# Patient Record
Sex: FEMALE | Race: WHITE | NOT HISPANIC OR LATINO | Employment: OTHER | URBAN - METROPOLITAN AREA
[De-identification: names, ages, dates, MRNs, and addresses within clinical notes are randomized per-mention and may not be internally consistent; named-entity substitution may affect disease eponyms.]

---

## 2019-07-30 ENCOUNTER — HOSPITAL ENCOUNTER (OUTPATIENT)
Dept: RADIOLOGY | Facility: HOSPITAL | Age: 69
Discharge: HOME | End: 2019-07-30
Attending: FAMILY MEDICINE
Payer: MEDICARE

## 2019-07-30 ENCOUNTER — OFFICE VISIT (OUTPATIENT)
Dept: FAMILY MEDICINE | Facility: CLINIC | Age: 69
End: 2019-07-30
Payer: MEDICARE

## 2019-07-30 VITALS — WEIGHT: 179 LBS | BODY MASS INDEX: 33.79 KG/M2 | HEIGHT: 61 IN

## 2019-07-30 VITALS
DIASTOLIC BLOOD PRESSURE: 74 MMHG | WEIGHT: 179.38 LBS | TEMPERATURE: 98 F | BODY MASS INDEX: 33.87 KG/M2 | HEIGHT: 61 IN | SYSTOLIC BLOOD PRESSURE: 118 MMHG | HEART RATE: 84 BPM

## 2019-07-30 DIAGNOSIS — Z12.31 ENCOUNTER FOR SCREENING MAMMOGRAM FOR BREAST CANCER: ICD-10-CM

## 2019-07-30 DIAGNOSIS — E03.9 HYPOTHYROIDISM, UNSPECIFIED TYPE: ICD-10-CM

## 2019-07-30 DIAGNOSIS — F17.210 CIGARETTE SMOKER: ICD-10-CM

## 2019-07-30 DIAGNOSIS — R93.7 ABNORMAL BONE DENSITY SCREENING: Primary | ICD-10-CM

## 2019-07-30 DIAGNOSIS — E11.65 UNCONTROLLED TYPE 2 DIABETES MELLITUS WITH HYPERGLYCEMIA: ICD-10-CM

## 2019-07-30 DIAGNOSIS — Z11.59 NEED FOR HEPATITIS C SCREENING TEST: ICD-10-CM

## 2019-07-30 DIAGNOSIS — E78.5 HYPERLIPIDEMIA, UNSPECIFIED HYPERLIPIDEMIA TYPE: ICD-10-CM

## 2019-07-30 DIAGNOSIS — Z78.0 MENOPAUSE: ICD-10-CM

## 2019-07-30 DIAGNOSIS — E55.9 VITAMIN D DEFICIENCY: ICD-10-CM

## 2019-07-30 DIAGNOSIS — Z12.11 COLON CANCER SCREENING: ICD-10-CM

## 2019-07-30 DIAGNOSIS — J44.9 CHRONIC OBSTRUCTIVE PULMONARY DISEASE, UNSPECIFIED COPD TYPE: ICD-10-CM

## 2019-07-30 DIAGNOSIS — I10 HYPERTENSION, ESSENTIAL: ICD-10-CM

## 2019-07-30 DIAGNOSIS — H66.006 RECURRENT ACUTE SUPPURATIVE OTITIS MEDIA WITHOUT SPONTANEOUS RUPTURE OF TYMPANIC MEMBRANE OF BOTH SIDES: ICD-10-CM

## 2019-07-30 DIAGNOSIS — Z79.899 ENCOUNTER FOR LONG-TERM (CURRENT) USE OF MEDICATIONS: Primary | ICD-10-CM

## 2019-07-30 DIAGNOSIS — N06.9 ISOLATED PROTEINURIA WITH MORPHOLOGIC LESION: ICD-10-CM

## 2019-07-30 PROBLEM — R06.02 SHORTNESS OF BREATH: Status: ACTIVE | Noted: 2017-07-18

## 2019-07-30 PROBLEM — I71.40 AAA (ABDOMINAL AORTIC ANEURYSM) WITHOUT RUPTURE: Status: ACTIVE | Noted: 2017-05-30

## 2019-07-30 PROBLEM — R09.02 HYPOXIA: Status: ACTIVE | Noted: 2017-06-19

## 2019-07-30 PROBLEM — J02.9 ACUTE PHARYNGITIS: Status: ACTIVE | Noted: 2018-02-02

## 2019-07-30 PROBLEM — R91.1 PULMONARY NODULE: Status: ACTIVE | Noted: 2019-05-07

## 2019-07-30 PROBLEM — H66.003 ACUTE SUPPURATIVE OTITIS MEDIA OF BOTH EARS WITHOUT SPONTANEOUS RUPTURE OF TYMPANIC MEMBRANES: Status: ACTIVE | Noted: 2019-07-30

## 2019-07-30 LAB
ALBUMIN/CREAT UR: 10.7 UG/MG (ref 0–30)
BILIRUB UR QL STRIP: NEGATIVE
CLARITY UR: CLEAR
COLOR UR: YELLOW
CREAT UR-MCNC: 149 MG/DL (ref 15–325)
GLUCOSE UR QL STRIP: NEGATIVE
HGB UR QL STRIP: ABNORMAL
KETONES UR QL STRIP: NEGATIVE
LEUKOCYTE ESTERASE UR QL STRIP: NEGATIVE
MICROALBUMIN UR DL<=1MG/L-MCNC: 16 UG/ML
NITRITE UR QL STRIP: NEGATIVE
PH UR STRIP: 6 [PH] (ref 5–8)
PROT UR QL STRIP: ABNORMAL
SP GR UR STRIP: 1.02 (ref 1–1.03)
URN SPEC COLLECT METH UR: ABNORMAL

## 2019-07-30 PROCEDURE — 77080 DEXA BONE DENSITY SPINE HIP: ICD-10-PCS | Mod: 26,,, | Performed by: RADIOLOGY

## 2019-07-30 PROCEDURE — 82043 UR ALBUMIN QUANTITATIVE: CPT

## 2019-07-30 PROCEDURE — 77067 SCR MAMMO BI INCL CAD: CPT | Mod: TC,PO

## 2019-07-30 PROCEDURE — 77063 BREAST TOMOSYNTHESIS BI: CPT | Mod: 26,,, | Performed by: RADIOLOGY

## 2019-07-30 PROCEDURE — 99999 PR PBB SHADOW E&M-NEW PATIENT-LVL V: ICD-10-PCS | Mod: PBBFAC,,, | Performed by: FAMILY MEDICINE

## 2019-07-30 PROCEDURE — 99205 PR OFFICE/OUTPT VISIT, NEW, LEVL V, 60-74 MIN: ICD-10-PCS | Mod: S$PBB,,, | Performed by: FAMILY MEDICINE

## 2019-07-30 PROCEDURE — 77080 DXA BONE DENSITY AXIAL: CPT | Mod: 26,,, | Performed by: RADIOLOGY

## 2019-07-30 PROCEDURE — 99999 PR PBB SHADOW E&M-NEW PATIENT-LVL V: CPT | Mod: PBBFAC,,, | Performed by: FAMILY MEDICINE

## 2019-07-30 PROCEDURE — 99205 OFFICE O/P NEW HI 60 MIN: CPT | Mod: PBBFAC,25,PO | Performed by: FAMILY MEDICINE

## 2019-07-30 PROCEDURE — 77067 SCR MAMMO BI INCL CAD: CPT | Mod: 26,,, | Performed by: RADIOLOGY

## 2019-07-30 PROCEDURE — 81002 URINALYSIS NONAUTO W/O SCOPE: CPT | Mod: PO

## 2019-07-30 PROCEDURE — 77063 MAMMO DIGITAL SCREENING BILAT WITH TOMOSYNTHESIS_CAD: ICD-10-PCS | Mod: 26,,, | Performed by: RADIOLOGY

## 2019-07-30 PROCEDURE — 99205 OFFICE O/P NEW HI 60 MIN: CPT | Mod: S$PBB,,, | Performed by: FAMILY MEDICINE

## 2019-07-30 PROCEDURE — 77080 DXA BONE DENSITY AXIAL: CPT | Mod: TC,PO

## 2019-07-30 PROCEDURE — 77067 MAMMO DIGITAL SCREENING BILAT WITH TOMOSYNTHESIS_CAD: ICD-10-PCS | Mod: 26,,, | Performed by: RADIOLOGY

## 2019-07-30 RX ORDER — ATORVASTATIN CALCIUM 40 MG/1
40 TABLET, FILM COATED ORAL DAILY
Qty: 90 TABLET | Refills: 3 | Status: SHIPPED | OUTPATIENT
Start: 2019-07-30 | End: 2020-05-30 | Stop reason: SDUPTHER

## 2019-07-30 RX ORDER — ALBUTEROL SULFATE 90 UG/1
1-2 AEROSOL, METERED RESPIRATORY (INHALATION)
Qty: 1 EACH | Refills: 11 | Status: SHIPPED | OUTPATIENT
Start: 2019-07-30 | End: 2021-05-29

## 2019-07-30 RX ORDER — UMECLIDINIUM BROMIDE AND VILANTEROL TRIFENATATE 62.5; 25 UG/1; UG/1
2 POWDER RESPIRATORY (INHALATION) 2 TIMES DAILY
COMMUNITY
Start: 2019-07-03 | End: 2019-07-30 | Stop reason: SDUPTHER

## 2019-07-30 RX ORDER — METFORMIN HYDROCHLORIDE 1000 MG/1
1000 TABLET ORAL 2 TIMES DAILY WITH MEALS
COMMUNITY
Start: 2019-05-06 | End: 2019-07-30 | Stop reason: SDUPTHER

## 2019-07-30 RX ORDER — SULFAMETHOXAZOLE AND TRIMETHOPRIM 800; 160 MG/1; MG/1
1 TABLET ORAL 2 TIMES DAILY
Qty: 14 TABLET | Refills: 0 | Status: SHIPPED | OUTPATIENT
Start: 2019-07-30 | End: 2019-08-06

## 2019-07-30 RX ORDER — ATORVASTATIN CALCIUM 40 MG/1
40 TABLET, FILM COATED ORAL DAILY
COMMUNITY
Start: 2019-07-05 | End: 2019-07-30 | Stop reason: SDUPTHER

## 2019-07-30 RX ORDER — METFORMIN HYDROCHLORIDE 1000 MG/1
1000 TABLET ORAL 2 TIMES DAILY WITH MEALS
Qty: 180 TABLET | Refills: 3 | Status: SHIPPED | OUTPATIENT
Start: 2019-07-30 | End: 2020-04-30

## 2019-07-30 RX ORDER — LEVOTHYROXINE SODIUM 50 UG/1
50 TABLET ORAL
COMMUNITY
Start: 2019-07-06 | End: 2019-07-30 | Stop reason: SDUPTHER

## 2019-07-30 RX ORDER — ALBUTEROL SULFATE 90 UG/1
1-2 AEROSOL, METERED RESPIRATORY (INHALATION)
COMMUNITY
Start: 2019-05-07 | End: 2019-07-30 | Stop reason: SDUPTHER

## 2019-07-30 RX ORDER — IPRATROPIUM BROMIDE AND ALBUTEROL SULFATE 2.5; .5 MG/3ML; MG/3ML
3 SOLUTION RESPIRATORY (INHALATION) EVERY 4 HOURS PRN
COMMUNITY
Start: 2017-05-16 | End: 2019-07-30 | Stop reason: SDUPTHER

## 2019-07-30 RX ORDER — UMECLIDINIUM BROMIDE AND VILANTEROL TRIFENATATE 62.5; 25 UG/1; UG/1
1 POWDER RESPIRATORY (INHALATION) DAILY
Qty: 1 EACH | Refills: 11 | Status: SHIPPED | OUTPATIENT
Start: 2019-07-30 | End: 2020-09-02

## 2019-07-30 RX ORDER — AMLODIPINE AND BENAZEPRIL HYDROCHLORIDE 10; 20 MG/1; MG/1
1 CAPSULE ORAL DAILY
Qty: 90 CAPSULE | Refills: 3 | Status: SHIPPED | OUTPATIENT
Start: 2019-07-30 | End: 2020-05-30 | Stop reason: SDUPTHER

## 2019-07-30 RX ORDER — CALCIUM CARBONATE 500(1250)
1 TABLET ORAL DAILY
Qty: 360 TABLET | Refills: 0 | Status: SHIPPED | OUTPATIENT
Start: 2019-07-30 | End: 2024-01-26

## 2019-07-30 RX ORDER — IPRATROPIUM BROMIDE AND ALBUTEROL SULFATE 2.5; .5 MG/3ML; MG/3ML
3 SOLUTION RESPIRATORY (INHALATION) EVERY 4 HOURS PRN
Qty: 1 BOX | Refills: 11 | Status: SHIPPED | OUTPATIENT
Start: 2019-07-30 | End: 2021-11-02 | Stop reason: SDUPTHER

## 2019-07-30 RX ORDER — LEVOTHYROXINE SODIUM 50 UG/1
50 TABLET ORAL
Qty: 90 TABLET | Refills: 3 | Status: SHIPPED | OUTPATIENT
Start: 2019-07-30 | End: 2020-05-30 | Stop reason: SDUPTHER

## 2019-07-30 RX ORDER — AMLODIPINE AND BENAZEPRIL HYDROCHLORIDE 10; 20 MG/1; MG/1
1 CAPSULE ORAL DAILY
COMMUNITY
Start: 2019-06-17 | End: 2019-07-30 | Stop reason: SDUPTHER

## 2019-07-30 RX ORDER — CALCIUM CARBONATE 500(1250)
1 TABLET ORAL DAILY
Refills: 0 | COMMUNITY
Start: 2019-07-30 | End: 2019-07-30

## 2019-07-30 NOTE — PROGRESS NOTES
Call patient with results.    You have abnormal DEXA scan which is showing osteopenia.  The patient needs to start using weight bearing exercises to help reduce the risk of a fracture.  Also, please start oscal 500+d taking 1 po tid.    Give #90 and rf x 11.    Ask the patient to recheck the DEXA scan in 2 years.

## 2019-07-30 NOTE — PATIENT INSTRUCTIONS
Chronic Lung Disease: If Oxygen Is Prescribed   Supplemental oxygen is prescribed if tests show that the level of oxygen in your blood is too low. If the level stays too low for too long, serious problems can develop in many parts of the body. Supplemental oxygen helps to relieve your symptoms and prevent future problems by getting more oxygen to the blood. Depending on your test results, you may need oxygen all the time. Or it may only be needed during certain activities, such as exercise or sleep. When oxygen is prescribed, youll be referred to a medical equipment company. They will set up the oxygen unit and teach you how to use it.  Nasal cannula     A nasal cannula should be placed in the nose with the prongs arching toward you.     Oxygen is most often inhaled through a nasal cannula (lightweight tube with two hollow prongs that fit just inside the nose).  Types of supplemental oxygen    Compressed oxygen   Oxygen concentrator   Liquid oxygen   Prescribed oxygen comes in several forms. You may use more than one type, depending on when you need oxygen:  · Compressed oxygen is oxygen gas stored in a tank. Because the oxygen is stored under pressure, these tanks must be handled carefully. Gauges on the tank can be used to adjust the oxygen flow rate. Your healthcare provider will determine what this should be. Small tanks can be carried. Larger tanks are on wheels and can be pulled around the house.  · An oxygen concentrator is a machine about the size of a large suitcase. It plugs into an electrical outlet. (A back-up oxygen supply is recommended in case of a power outage.) The machine takes oxygen from the air and concentrates it. Its then delivered to you through plastic tubing. The tubing is long enough so that you can move around the house. When youre using the concentrator, it must be kept somewhere that has a good supply of fresh air. (Dont keep it in a confined space, like a closet.) You may be set  up on a concentrator if you need oxygen all the time or while youre sleeping.  · Liquid oxygen results when oxygen gas is cooled to a very low temperature. Its kept in special containers that maintain this low temperature. When you use liquid oxygen, its warmed and becomes gas before reaching the cannula. Most tanks come with a portable unit that you can carry or pull on a cart. Some of these weigh only a few pounds. Liquid oxygen units are easy to carry around. If you need oxygen all the time or during activity, this kind of unit can help you stay active.  Oxygen is prescribed just for you  Your healthcare provider will prescribe oxygen based on your needs. Here are a few things you should know:  · A therapist from the medical equipment company will explain when to use oxygen and what type to use. Youll be taught how to use and maintain your oxygen equipment.  · You must use the exact rate of oxygen prescribed for each activity. Dont increase or decrease the amount without asking your healthcare provider first.  · Supplemental oxygen is a medicine. Its not addictive and causes no side effects when used as directed.   Date Last Reviewed: 5/1/2016  © 2530-7457 [employer]. [address] [place] [address], [place] [address]. All rights reserved. This information is not intended as a substitute for professional medical care. Always follow your healthcare professional's instructions.

## 2019-07-30 NOTE — ASSESSMENT & PLAN NOTE
Complete history and physical was completed today.  Complete and thorough medication reconciliation was performed.  Discussed risks and benefits of medications.  Advised patient on orders and health maintenance.  We discussed old records and old labs if available.  Will request any records not available through epic.  Continue current medications listed on your summary sheet.  07/30/2019

## 2019-07-30 NOTE — PROGRESS NOTES
Your urinalysis is abnormal.  Showing trace blood and trace protein.  Will need to repeat this test in about 2 weeks.  Or at your next visit.

## 2019-07-30 NOTE — TELEPHONE ENCOUNTER
----- Message from Jose Steinberg MD sent at 7/30/2019  1:05 PM CDT -----  Call patient with results.    You have abnormal DEXA scan which is showing osteopenia.  The patient needs to start using weight bearing exercises to help reduce the risk of a fracture.  Also, please start oscal 500+d taking 1 po tid.    Give #90 and rf x 11.    Ask the patient to recheck the DEXA scan in 2 years.

## 2019-07-30 NOTE — PROGRESS NOTES
Subjective:      Patient ID: Kami Higgins is a 68 y.o. female.    Chief Complaint: Establish Care      Problem List Items Addressed This Visit     COPD (chronic obstructive pulmonary disease)    Overview     Chronic.  Control uncertain.  Patient takes albuterol inhaler and also has a nebulizer at home.  Patient sees pulmonology.  Patient is supposed to be on oxygen.         Current Assessment & Plan     Follow-up pulmonology.  Refill inhalers.         Encounter for long-term (current) use of medications - Primary    Overview     07/30/2019   Patient is on CHRONIC long-term drug therapy for managed conditions. See medication list. Reports compliance.  No side effects reported.  Routine lab work is being monitored.  Patient does  need refills today.     Updating labs today.  Requesting records from [facility].    No results found for: WBC, HGB, HCT, MCV, PLT   CMP  No results found for: NA, K, CL, CO2, GLU, BUN, CREATININE, CALCIUM, PROT, ALBUMIN, BILITOT, ALKPHOS, AST, ALT, ANIONGAP, ESTGFRAFRICA, EGFRNONAA  No results found for: TSH            Current Assessment & Plan     Complete history and physical was completed today.  Complete and thorough medication reconciliation was performed.  Discussed risks and benefits of medications.  Advised patient on orders and health maintenance.  We discussed old records and old labs if available.  Will request any records not available through epic.  Continue current medications listed on your summary sheet.  07/30/2019           Hyperlipidemia    Overview     Chronic.  Control is uncertain.  Requesting records.  Checking fasting lipid panel.  Continue Lipitor.  No side effects reported.  Reports compliance.         Current Assessment & Plan     Continue Lipitor.  Checking fasting lipid panel         Hypertension, essential    Overview     Chronic.  Stable.  Well controlled today.  On amlodipine benazepril combination.  Reports compliance.  No side effects reported.  Denies any  chest pain shortness of breath blurred vision.         Current Assessment & Plan     Continue monitor blood pressure.  Checking labs.  Refill medications.         Uncontrolled type 2 diabetes mellitus with hyperglycemia    Overview     07/30/2019   Chronic. Stable.     No results found for: LABA1C, HGBA1C     Patient taking metformin    Med Compliance:  [facility]?  Dr. Royce Reilly in [place]  Ft exam?  Up-to-date  Pneumonia Vaccine?  Unsure         Current Assessment & Plan     Monitor hemoglobin A1c.  Discussed diabetic diet and exercise protocol.  Continue medications.  Monitor for side effects.  Discussed checking blood glucose.  Discussed symptoms to monitor for and to notify me immediately if persistent or worsening.  Follow up with Ophthalmology/Optometry and Podiatry.         Hypothyroidism    Overview     Patient on levothyroxine 50 mcg.  Chronic.  Stable.  Levels due for check.  No symptoms currently.  Reports compliance.  No side effects.         Current Assessment & Plan     Continue levothyroxine 50 mcg.  Check levels.         Need for hepatitis C screening test    Overview     Born in 1950.         Cigarette smoker    Overview     Chronic.  Patient says that she will quit smoking cold turkey.  Does not want any help otherwise.         Current Assessment & Plan     Advised of risk of smoking.  Order to quit smoking.         Encounter for screening mammogram for breast cancer    Overview     Due for mammogram screening.  No history of abnormals.  Past due         Current Assessment & Plan     Due for mammogram.         Colon cancer screening    Overview     Due for screening colonoscopy.  Sixteen years past due.         Current Assessment & Plan     Referral for colonoscopy.         Menopause    Overview     Bone density screening.  Patient is not on calcium or vitamin-D currently         Current Assessment & Plan     DXA Bone Density Spine And Hip  Narrative: EXAMINATION:  DEXA BONE DENSITY SPINE  HIP    CLINICAL HISTORY:  Asymptomatic menopausal state    TECHNIQUE:  DXA scanning was performed over the left hip and lumbar spine.  Review of the images confirms satisfactory positioning and technique.    COMPARISON:  None    FINDINGS:  The L1 to L4 vertebral bone mineral density is equal to 1.078 g/cm squared with a T score of -1.0.    The left femoral neck bone mineral density is equal to 0.793 g/cm squared with a T score of -1.8.    There is a 9.8% risk of a major osteoporotic fracture and a 2.1% risk of hip fracture in the next 10 years (FRAX).  Impression: Osteopenia    Consider FDA approved medical therapies in postmenopausal women and men aged 50 years and older, based on the following:    *A hip or vertebral (clinical or morphometric) fracture  *T score less than or equal to -2.5 at the femoral neck or spine after appropriate evaluation to exclude secondary causes.  *Low bone mass -- also known as osteopenia (T score between -1.0 and -2.5 at the femoral neck or spine) and a 10 year probability of hip fracture greater than or equal to 3% or a 10 year probability of major osteoporosis-related fracture greater than or equal to 20% based on the [place]-adapted WHO algorithm.  *Clinicians judgment and/or patient preference may indicate treatment for people with 10 year fracture probabilities is above or below these levels.    Electronically signed by: Deyvi Galdamez DO  Date:    07/30/2019  Time:    13:00      Started on Oscal +D         Vitamin D deficiency    Overview       07/30/2019  Vit d deficiency.  Needs to be on vitamin d supplement. No SE reported.  Has osteopenia.         Current Assessment & Plan     Checking vitamin-D level         Acute suppurative otitis media of both ears without spontaneous rupture of tympanic membranes    Overview     Acute.  Worsening.  Patient has bad allergies.  Has Flonase at home.  Has not taken anything for this.  Has not been evaluated.  Denies any fever.  Pain is  moderate.         Current Assessment & Plan     Treating with Bactrim.  Patient is allergic to penicillin.         Isolated proteinuria with morphologic lesion    Overview     Abnormal urinalysis.  Repeat scheduled in 2 weeks.  Patient denies any urologic complaints this time.         Current Assessment & Plan     Repeat urinalysis in 2 weeks.  If still having protein will refer to Nephrology.                Past Medical History:  Past Medical History:   Diagnosis Date    AAA (abdominal aortic aneurysm) without rupture     COPD (chronic obstructive pulmonary disease)     Hyperlipidemia     Hypertension     Pulmonary nodule     Thyroid disease      Past Surgical History:   Procedure Laterality Date    ABDOMINAL AORTIC ANEURYSM REPAIR      cardiac catheterization  2017    HYSTERECTOMY      THYROIDECTOMY      TUBAL LIGATION       Review of patient's allergies indicates:   Allergen Reactions    Aspirin, buffered     Codeine     Penicillins Hives     Current Outpatient Medications on File Prior to Visit   Medication Sig Dispense Refill    [DISCONTINUED] albuterol-ipratropium (DUO-NEB) 2.5 mg-0.5 mg/3 mL nebulizer solution Take 3 mLs by nebulization every 4 (four) hours as needed for Wheezing or Shortness of Breath.       [DISCONTINUED] amlodipine-benazepril 10-20mg (LOTREL) 10-20 mg per capsule Take 1 capsule by mouth once daily.       [DISCONTINUED] ANORO ELLIPTA 62.5-25 mcg/actuation DsDv Inhale 2 puffs into the lungs 2 (two) times daily.       [DISCONTINUED] atorvastatin (LIPITOR) 40 MG tablet Take 40 mg by mouth once daily.       [DISCONTINUED] levothyroxine (SYNTHROID) 50 MCG tablet Take 50 mcg by mouth before breakfast.       [DISCONTINUED] metFORMIN (GLUCOPHAGE) 1000 MG tablet Take 1,000 mg by mouth 2 (two) times daily with meals.       [DISCONTINUED] VENTOLIN HFA 90 mcg/actuation inhaler Inhale 1-2 puffs into the lungs as needed for Wheezing or Shortness of Breath.        No current  facility-administered medications on file prior to visit.      Social History     Socioeconomic History    Marital status:      Spouse name: Not on file    Number of children: Not on file    Years of education: Not on file    Highest education level: Not on file   Occupational History    Not on file   Social Needs    Financial resource strain: Somewhat hard    Food insecurity:     Worry: Sometimes true     Inability: Sometimes true    Transportation needs:     Medical: No     Non-medical: No   Tobacco Use    Smoking status: Current Some Day Smoker    Smokeless tobacco: Never Used   Substance and Sexual Activity    Alcohol use: Never     Frequency: Never    Drug use: Never    Sexual activity: Not Currently     Partners: Male   Lifestyle    Physical activity:     Days per week: 3 days     Minutes per session: 40 min    Stress: To some extent   Relationships    Social connections:     Talks on phone: More than three times a week     Gets together: Three times a week     Attends Faith service: Never     Active member of club or organization: No     Attends meetings of clubs or organizations: Never     Relationship status: Not on file   Other Topics Concern    Not on file   Social History Narrative    Not on file     Family History   Family history unknown: Yes       I have reviewed the complete PMH, social history, surgical history, allergies and medications.  As well as family history.    Review of Systems   Constitutional: Positive for fatigue. Negative for activity change and unexpected weight change.   HENT: Positive for ear pain. Negative for hearing loss, rhinorrhea and trouble swallowing.    Eyes: Negative for discharge and visual disturbance.   Respiratory: Negative for chest tightness and wheezing.    Cardiovascular: Negative for chest pain and palpitations.   Gastrointestinal: Negative for blood in stool, constipation, diarrhea and vomiting.   Endocrine: Negative for polydipsia and  "polyuria.   Genitourinary: Negative for difficulty urinating, dysuria, hematuria and menstrual problem.   Musculoskeletal: Negative for arthralgias, joint swelling and neck pain.   Neurological: Negative for weakness and headaches.   Psychiatric/Behavioral: Negative for confusion and dysphoric mood.       Objective:     /74   Pulse 84   Temp 97.9 °F (36.6 °C) (Oral)   Ht 5' 1" (1.549 m)   Wt 81.4 kg (179 lb 6.4 oz)   BMI 33.90 kg/m²     Physical Exam   Constitutional: She is oriented to person, place, and time. She appears well-developed and well-nourished. No distress.   HENT:   Head: Normocephalic and atraumatic.   Right Ear: There is tenderness. Tympanic membrane is injected, erythematous and bulging. A middle ear effusion is present.   Left Ear: There is tenderness. Tympanic membrane is erythematous and bulging. Tympanic membrane is not injected. A middle ear effusion is present.   Eyes: Pupils are equal, round, and reactive to light. EOM are normal.   Neck: Normal range of motion. Neck supple.   Cardiovascular: Normal rate, regular rhythm, normal heart sounds and intact distal pulses.   No murmur heard.  Pulmonary/Chest: Effort normal and breath sounds normal. No respiratory distress. She has no wheezes.   Musculoskeletal: Normal range of motion. She exhibits no edema.   Neurological: She is alert and oriented to person, place, and time. No cranial nerve deficit.   Skin: Skin is warm and dry. Capillary refill takes less than 2 seconds.   Psychiatric: She has a normal mood and affect. Her behavior is normal.   Nursing note and vitals reviewed.      Assessment:     1. Encounter for long-term (current) use of medications    2. Chronic obstructive pulmonary disease, unspecified COPD type    3. Hyperlipidemia, unspecified hyperlipidemia type    4. Hypertension, essential    5. Uncontrolled type 2 diabetes mellitus with hyperglycemia    6. Hypothyroidism, unspecified type    7. Need for hepatitis C " screening test    8. Cigarette smoker    9. Encounter for screening mammogram for breast cancer    10. Colon cancer screening    11. Menopause    12. Vitamin D deficiency    13. Recurrent acute suppurative otitis media without spontaneous rupture of tympanic membrane of both sides    14. Isolated proteinuria with morphologic lesion        Plan:     I have Reviewed and summarized old records.  I have performed thorough medication reconciliation today and discussed risk and benefits of each medication.  I have reviewed labs and discussed with patient.  All questions were answered.  I am requesting old records and will review them once they are available.    Problem List Items Addressed This Visit     COPD (chronic obstructive pulmonary disease)     Follow-up pulmonology.  Refill inhalers.         Relevant Medications    albuterol-ipratropium (DUO-NEB) 2.5 mg-0.5 mg/3 mL nebulizer solution    ANORO ELLIPTA 62.5-25 mcg/actuation DsDv    VENTOLIN HFA 90 mcg/actuation inhaler    Other Relevant Orders    CBC auto differential    Comprehensive metabolic panel    Ambulatory referral to Pulmonology    Encounter for long-term (current) use of medications - Primary     Complete history and physical was completed today.  Complete and thorough medication reconciliation was performed.  Discussed risks and benefits of medications.  Advised patient on orders and health maintenance.  We discussed old records and old labs if available.  Will request any records not available through epic.  Continue current medications listed on your summary sheet.  07/30/2019           Relevant Medications    albuterol-ipratropium (DUO-NEB) 2.5 mg-0.5 mg/3 mL nebulizer solution    amlodipine-benazepril 10-20mg (LOTREL) 10-20 mg per capsule    ANORO ELLIPTA 62.5-25 mcg/actuation DsDv    atorvastatin (LIPITOR) 40 MG tablet    levothyroxine (SYNTHROID) 50 MCG tablet    metFORMIN (GLUCOPHAGE) 1000 MG tablet    VENTOLIN HFA 90 mcg/actuation inhaler     Other Relevant Orders    CBC auto differential    Comprehensive metabolic panel    Hemoglobin A1c    Lipid panel    TSH    T3, free    T4    Vitamin D    URINALYSIS (Completed)    Hyperlipidemia     Continue Lipitor.  Checking fasting lipid panel         Relevant Medications    atorvastatin (LIPITOR) 40 MG tablet    Other Relevant Orders    Lipid panel    Vitamin D    Hypertension, essential     Continue monitor blood pressure.  Checking labs.  Refill medications.         Relevant Medications    amlodipine-benazepril 10-20mg (LOTREL) 10-20 mg per capsule    Other Relevant Orders    CBC auto differential    Comprehensive metabolic panel    Uncontrolled type 2 diabetes mellitus with hyperglycemia     Monitor hemoglobin A1c.  Discussed diabetic diet and exercise protocol.  Continue medications.  Monitor for side effects.  Discussed checking blood glucose.  Discussed symptoms to monitor for and to notify me immediately if persistent or worsening.  Follow up with Ophthalmology/Optometry and Podiatry.         Relevant Medications    metFORMIN (GLUCOPHAGE) 1000 MG tablet    Other Relevant Orders    Comprehensive metabolic panel    Hemoglobin A1c    Vitamin D    URINALYSIS (Completed)    MICROALBUMIN / CREATININE RATIO URINE    Hypothyroidism     Continue levothyroxine 50 mcg.  Check levels.         Relevant Medications    levothyroxine (SYNTHROID) 50 MCG tablet    Other Relevant Orders    Comprehensive metabolic panel    TSH    T3, free    T4    Vitamin D    Need for hepatitis C screening test    Relevant Orders    Hepatitis C antibody    Cigarette smoker     Advised of risk of smoking.  Order to quit smoking.         Relevant Orders    Ambulatory referral to Smoking Cessation Program    Encounter for screening mammogram for breast cancer     Due for mammogram.         Colon cancer screening     Referral for colonoscopy.         Relevant Orders    Case request GI: COLONOSCOPY (Completed)    Menopause     DXA Bone Density  Spine And Hip  Narrative: EXAMINATION:  DEXA BONE DENSITY SPINE HIP    CLINICAL HISTORY:  Asymptomatic menopausal state    TECHNIQUE:  DXA scanning was performed over the left hip and lumbar spine.  Review of the images confirms satisfactory positioning and technique.    COMPARISON:  None    FINDINGS:  The L1 to L4 vertebral bone mineral density is equal to 1.078 g/cm squared with a T score of -1.0.    The left femoral neck bone mineral density is equal to 0.793 g/cm squared with a T score of -1.8.    There is a 9.8% risk of a major osteoporotic fracture and a 2.1% risk of hip fracture in the next 10 years (FRAX).  Impression: Osteopenia    Consider FDA approved medical therapies in postmenopausal women and men aged 50 years and older, based on the following:    *A hip or vertebral (clinical or morphometric) fracture  *T score less than or equal to -2.5 at the femoral neck or spine after appropriate evaluation to exclude secondary causes.  *Low bone mass -- also known as osteopenia (T score between -1.0 and -2.5 at the femoral neck or spine) and a 10 year probability of hip fracture greater than or equal to 3% or a 10 year probability of major osteoporosis-related fracture greater than or equal to 20% based on the [place]-adapted WHO algorithm.  *Clinicians judgment and/or patient preference may indicate treatment for people with 10 year fracture probabilities is above or below these levels.    Electronically signed by: Deyvi Galdamez DO  Date:    07/30/2019  Time:    13:00      Started on Oscal +D         Relevant Orders    DXA Bone Density Spine And Hip (Completed)    Vitamin D deficiency     Checking vitamin-D level         Relevant Orders    Vitamin D    Acute suppurative otitis media of both ears without spontaneous rupture of tympanic membranes     Treating with Bactrim.  Patient is allergic to penicillin.         Relevant Medications    sulfamethoxazole-trimethoprim 800-160mg (BACTRIM DS) 800-160 mg Tab     Isolated proteinuria with morphologic lesion     Repeat urinalysis in 2 weeks.  If still having protein will refer to Nephrology.         Relevant Orders    URINALYSIS          Follow up in about 2 weeks (around 8/13/2019) for LAB RESULTS .    DISCLAIMER: This note was compiled by using a speech recognition dictation system and therefore please be aware that typographical / speech recognition errors can and do occur.  Please contact me if you see any errors specifically.    Jose Steinberg MD  We Offer Telehealth & Same Day Appointments!   Book your Telehealth appointment with me through my nurse or   Clinic appointments on [employer]!    Office: [phone]          Check out my Facebook Page and Follow Me at: CLICK HERE    Check out my website at [email or web address] by clicking on: CLICK HERE    To Schedule appointments online, go to [employer]: CLICK HERE     Location: [email or web address]    [address] [place] [address]    FAX: [phone]

## 2019-07-30 NOTE — ASSESSMENT & PLAN NOTE
DXA Bone Density Spine And Hip  Narrative: EXAMINATION:  DEXA BONE DENSITY SPINE HIP    CLINICAL HISTORY:  Asymptomatic menopausal state    TECHNIQUE:  DXA scanning was performed over the left hip and lumbar spine.  Review of the images confirms satisfactory positioning and technique.    COMPARISON:  None    FINDINGS:  The L1 to L4 vertebral bone mineral density is equal to 1.078 g/cm squared with a T score of -1.0.    The left femoral neck bone mineral density is equal to 0.793 g/cm squared with a T score of -1.8.    There is a 9.8% risk of a major osteoporotic fracture and a 2.1% risk of hip fracture in the next 10 years (FRAX).  Impression: Osteopenia    Consider FDA approved medical therapies in postmenopausal women and men aged 50 years and older, based on the following:    *A hip or vertebral (clinical or morphometric) fracture  *T score less than or equal to -2.5 at the femoral neck or spine after appropriate evaluation to exclude secondary causes.  *Low bone mass -- also known as osteopenia (T score between -1.0 and -2.5 at the femoral neck or spine) and a 10 year probability of hip fracture greater than or equal to 3% or a 10 year probability of major osteoporosis-related fracture greater than or equal to 20% based on the [place]-adapted WHO algorithm.  *Clinicians judgment and/or patient preference may indicate treatment for people with 10 year fracture probabilities is above or below these levels.    Electronically signed by: Deyvi Galdamez DO  Date:    07/30/2019  Time:    13:00      Started on [place] +D

## 2019-07-31 ENCOUNTER — TELEPHONE (OUTPATIENT)
Dept: FAMILY MEDICINE | Facility: CLINIC | Age: 69
End: 2019-07-31

## 2019-07-31 ENCOUNTER — TELEPHONE (OUTPATIENT)
Dept: ENDOSCOPY | Facility: HOSPITAL | Age: 69
End: 2019-07-31

## 2019-07-31 NOTE — PROGRESS NOTES
Patient's results were released through my chart and was notified.  Please follow up to make sure that they received the results.  Released results through my chart.

## 2019-07-31 NOTE — TELEPHONE ENCOUNTER
Endoscopy Scheduling Questionnaire:    1. Have you been admitted overnight to the hospital in the past 3 months? no  2. Do you get chest pain and SOB while walking up a flight of stairs? no  3. Have you had a cardiac stent placed in the past 12 months? no  4. Have you had a stroke or heart attack in the past 6 months? no  5. Have you had any chest pain in the past 3 months? no      If so, have you been evaluated by your PCP or Cardiologist? no  6. Do you take medication for weight loss? no  7. Have you been diagnosed with Diverticulitis within the past 3 months? no  8. Are you having any GI symptoms that you feel need to be evaluated prior to your procedure? no  9. Are you on dialysis? no  10. Are you diabetic? yes, pt will hold  11. Do you have any other health issues that you feel might limit your ability to safely have the procedure and/or sedation? yes     Patient stated she was told not to be sedated again due to lung/heart health concerns.    Patient will contact PCP regarding order for colonoscopy and call back.

## 2019-07-31 NOTE — TELEPHONE ENCOUNTER
----- Message from Liliane Goldsmith sent at 7/31/2019 11:43 AM CDT -----  Contact: pt   States she's calling rg speaking with nurse rg pt not being able to be put under or she may not come out of it per pt's pulmonologist and was told to contact her pcp rg the test that is needing to be scheduled and can be reached at [phone]//thanks/dbw

## 2019-07-31 NOTE — TELEPHONE ENCOUNTER
----- Message from Liliane Goldsmith sent at 7/31/2019 11:43 AM CDT -----  Contact: pt   States she's calling rg speaking with nurse rg pt not being able to be put under or she may not come out of it per pt's pulmonologist and was told to contact her pcp rg the test that is needing to be scheduled and can be reached at [phone]//thanks/dbw

## 2019-07-31 NOTE — TELEPHONE ENCOUNTER
Spoke with patient regarding this and Dr. Steinberg says to switch patient to Cologuard instead of colonoscopy

## 2019-07-31 NOTE — TELEPHONE ENCOUNTER
See will procedure she is referring to.  If it is the colonoscopy that we can try Cologard 1st.  She may need to be evaluated further for this.

## 2019-08-02 ENCOUNTER — TELEPHONE (OUTPATIENT)
Dept: RADIOLOGY | Facility: HOSPITAL | Age: 69
End: 2019-08-02

## 2019-08-05 ENCOUNTER — HOSPITAL ENCOUNTER (OUTPATIENT)
Dept: RADIOLOGY | Facility: HOSPITAL | Age: 69
Discharge: HOME | End: 2019-08-05
Attending: FAMILY MEDICINE
Payer: MEDICARE

## 2019-08-05 DIAGNOSIS — R92.8 ABNORMAL MAMMOGRAM: ICD-10-CM

## 2019-08-05 DIAGNOSIS — N63.20 LEFT BREAST MASS: Primary | ICD-10-CM

## 2019-08-05 PROCEDURE — 76642 ULTRASOUND BREAST LIMITED: CPT | Mod: 26,LT,, | Performed by: RADIOLOGY

## 2019-08-05 PROCEDURE — 76642 US BREAST LEFT LIMITED: ICD-10-PCS | Mod: 26,LT,, | Performed by: RADIOLOGY

## 2019-08-05 PROCEDURE — 76642 ULTRASOUND BREAST LIMITED: CPT | Mod: TC,PO,LT

## 2019-08-05 NOTE — PROGRESS NOTES
Abnormal breast ultrasound.  Repeat in 6 months.  Order sign.  Make sure patient has follow-up appointment for 6 months.  She will also come in in a couple of weeks to discuss labs and other issues.

## 2019-08-13 ENCOUNTER — PATIENT OUTREACH (OUTPATIENT)
Dept: ADMINISTRATIVE | Facility: HOSPITAL | Age: 69
End: 2019-08-13

## 2019-08-20 ENCOUNTER — LAB VISIT (OUTPATIENT)
Dept: LAB | Facility: HOSPITAL | Age: 69
End: 2019-08-20
Attending: FAMILY MEDICINE
Payer: MEDICARE

## 2019-08-20 ENCOUNTER — OFFICE VISIT (OUTPATIENT)
Dept: FAMILY MEDICINE | Facility: CLINIC | Age: 69
End: 2019-08-20
Payer: MEDICARE

## 2019-08-20 VITALS
WEIGHT: 179.38 LBS | SYSTOLIC BLOOD PRESSURE: 124 MMHG | HEART RATE: 88 BPM | BODY MASS INDEX: 33.9 KG/M2 | TEMPERATURE: 98 F | DIASTOLIC BLOOD PRESSURE: 80 MMHG

## 2019-08-20 DIAGNOSIS — D75.89 MACROCYTOSIS WITHOUT ANEMIA: ICD-10-CM

## 2019-08-20 DIAGNOSIS — N63.20 LEFT BREAST MASS: ICD-10-CM

## 2019-08-20 DIAGNOSIS — E46 PROTEIN-CALORIE MALNUTRITION, UNSPECIFIED SEVERITY: ICD-10-CM

## 2019-08-20 DIAGNOSIS — R31.9 HEMATURIA, UNSPECIFIED TYPE: Primary | ICD-10-CM

## 2019-08-20 DIAGNOSIS — H66.93 CHRONIC OTITIS MEDIA OF BOTH EARS: ICD-10-CM

## 2019-08-20 DIAGNOSIS — H92.02 LEFT EAR PAIN: ICD-10-CM

## 2019-08-20 LAB
BILIRUB UR QL STRIP: NEGATIVE
CLARITY UR: CLEAR
COLOR UR: YELLOW
FOLATE SERPL-MCNC: 11.5 NG/ML (ref 4–24)
GLUCOSE UR QL STRIP: NEGATIVE
HGB UR QL STRIP: ABNORMAL
KETONES UR QL STRIP: NEGATIVE
LEUKOCYTE ESTERASE UR QL STRIP: NEGATIVE
NITRITE UR QL STRIP: NEGATIVE
PH UR STRIP: 6 [PH] (ref 5–8)
PROT UR QL STRIP: NEGATIVE
SP GR UR STRIP: 1.02 (ref 1–1.03)
URN SPEC COLLECT METH UR: ABNORMAL
VIT B12 SERPL-MCNC: 267 PG/ML (ref 210–950)

## 2019-08-20 PROCEDURE — 82607 VITAMIN B-12: CPT

## 2019-08-20 PROCEDURE — 99214 OFFICE O/P EST MOD 30 MIN: CPT | Mod: S$PBB,,, | Performed by: FAMILY MEDICINE

## 2019-08-20 PROCEDURE — 99214 PR OFFICE/OUTPT VISIT, EST, LEVL IV, 30-39 MIN: ICD-10-PCS | Mod: S$PBB,,, | Performed by: FAMILY MEDICINE

## 2019-08-20 PROCEDURE — 81002 URINALYSIS NONAUTO W/O SCOPE: CPT | Mod: PO

## 2019-08-20 PROCEDURE — 99214 OFFICE O/P EST MOD 30 MIN: CPT | Mod: PBBFAC,PO | Performed by: FAMILY MEDICINE

## 2019-08-20 PROCEDURE — 99999 PR PBB SHADOW E&M-EST. PATIENT-LVL IV: ICD-10-PCS | Mod: PBBFAC,,, | Performed by: FAMILY MEDICINE

## 2019-08-20 PROCEDURE — 99999 PR PBB SHADOW E&M-EST. PATIENT-LVL IV: CPT | Mod: PBBFAC,,, | Performed by: FAMILY MEDICINE

## 2019-08-20 PROCEDURE — 36415 COLL VENOUS BLD VENIPUNCTURE: CPT | Mod: PO

## 2019-08-20 PROCEDURE — 82746 ASSAY OF FOLIC ACID SERUM: CPT

## 2019-08-20 RX ORDER — CEFDINIR 300 MG/1
300 CAPSULE ORAL 2 TIMES DAILY
Qty: 20 CAPSULE | Refills: 0 | Status: SHIPPED | OUTPATIENT
Start: 2019-08-20 | End: 2019-08-30

## 2019-08-20 NOTE — PATIENT INSTRUCTIONS
What is Hematuria?  Blood in your urine is a condition known as hematuria. Most of the time, the cause of hematuria is not serious. But, never ignore blood in the urine. Your doctor can evaluate you to find the cause of the bleeding and treat it, if needed.  Types of hematuria  · Gross hematuria means that the blood can be seen by the naked eye. The urine may look pinkish, brownish, or bright red.  · Microscopic hematuria means that the urine is clear, but blood cells can be seen when urine is looked at under a microscope or tested in a lab.  Both types of hematuria can have the same causes. Neither one is necessarily more serious than the other. With either type, you may have other symptoms, such as pain, pressure, or burning when you urinate, abdominal pain, or back pain. Or, you may not have any other symptoms. No matter how much blood is found, the cause of the bleeding needs to be identified.  Finding the cause of hematuria  To evaluate your condition, your doctor will first confirm that blood is indeed present. Then other tests are done to pinpoint where the blood is coming from and why. Your doctor will decide which tests will best determine the cause of your hematuria. Some common tests are listed below.  · History and physical exam  · Lab tests may include urinalysis, a urine culture, a urine cytology, and various blood tests  · Cystoscopy  · Computed tomography (CT) or CT urography  · Magnetic resonance imaging (MRI) or MR urography  · Ultrasound of the kidney  · Kidney biopsy  Causes of hematuria include the very benign (exercised induced hematuria) to the very severe (cancer of the urinary system). A variety of treatments are available depending on the cause.  Date Last Reviewed: 12/2/2016 © 2000-2017 [employer]. [address] [place] [id number]. All rights reserved. This information is not intended as a substitute for professional medical care. Always follow your healthcare  professional's instructions.

## 2019-08-20 NOTE — PROGRESS NOTES
Subjective:      Patient ID: Kami Higgins is a 68 y.o. female.    Chief Complaint: Follow-up (review lab results)      Problem List Items Addressed This Visit     Hematuria - Primary    Overview     Chronic.  Persistent hematuria.  Patient is a heavy smoker.  Patient needs follow-up with Urology.         Current Assessment & Plan     Referral to Urology.  No gross blood but persistent hematuria on urinalysis.  Patient is heavy smoker.         Macrocytosis without anemia    Overview     CBC stable but has elevated MCV.  Patient denies history of B12 or folate deficiency.  Checking folate B12.    Lab Results   Component Value Date    [id number] 267 08/20/2019     Lab Results   Component Value Date    FOLATE 11.5 08/20/2019              Current Assessment & Plan     Recommend vitamin B12 supplementation.  Vitamin B12 is at lower limits of normal.  Folate is normal.  Recheck CBC and B12 levels in 3 months.  ER precautions were given if have any signs or symptoms of anemia.  All questions were answered.  Patient voiced understanding.    Avoid excessive amounts of alcohol.  Avoid anti-inflammatories.         Left ear pain    Overview     Chronic.  Worsening left ear pain.  Associated with otitis media.  Patient has not seen ENT.  Patient is still smoking.         Current Assessment & Plan     Recommend ENT follow-up.  Will treat with antibiotics.  Flonase.  Stop smoking.  ER precautions were given.         Protein-calorie malnutrition    Overview     Patient reports decreased muscle mass.  Patient has not been eating much due to decreased appetite.  Patient is concerned about B12 folate deficiency.  Patient has microcytosis on laboratory analysis         Current Assessment & Plan     Vitamin B12 is deficient.  Folate is within normal limits.  Checking levels.  Discussed diet exercise.         Left breast mass    Overview     Narrative & Impression     Result:   US Breast Left Limited     History:  Patient is 68 y.o. and  is seen for a diagnostic mammogram.     Films Compared:  Compared to: 07/30/2019 Mammo Digital Screening Bilat w/ Corey     Findings:     There is a 5 mm x 5 mm x 4 mm oval, hypoechoic mass seen in the left breast, 3 cm from the nipple.      Impression:  Left  Mass: Left breast 5 mm x 5 mm x 4 mm mass. Assessment: 3 - Probably benign. Short Interval Follow-Up in 6 Months is recommended.      BI-RADS Category:   Left: 3 - Probably Benign  Overall: 3 - Probably Benign     Recommendation:  Short interval follow-up is recommended in 6 Months.     The patient's estimated lifetime risk of breast cancer (to age 85) based on Tyrer-Cuzick - 7 risk assessment model is: Tyrer-Cuzick: 4.91 %. According to the American Cancer Society,  patients with a lifetime breast cancer risk of 20% or higher might benefit from supplemental screening tests.              Current Assessment & Plan     Reviewed mammogram and ultrasound results.  Recommend 6 months follow-up.  Patient should be scheduled.         Chronic otitis media of both ears    Overview     Chronic.  Not improving.  Patient has been on antibiotics with no success.  She is allergic to penicillins.  Patient has not seen ENT.  Patient is using over-the-counter medications without relief.         Current Assessment & Plan     Acute on chronic infection.  Treating with cefdinir.  Patient is penicillin allergic.  Recommended that patient use Flonase over-the-counter.                Past Medical History:  Past Medical History:   Diagnosis Date    AAA (abdominal aortic aneurysm) without rupture     COPD (chronic obstructive pulmonary disease)     Hyperlipidemia     Hypertension     Pulmonary nodule     Thyroid disease      Past Surgical History:   Procedure Laterality Date    ABDOMINAL AORTIC ANEURYSM REPAIR      cardiac catheterization  2017    HYSTERECTOMY      THYROIDECTOMY      TUBAL LIGATION       Review of patient's allergies indicates:   Allergen Reactions     Aspirin, buffered     Codeine     Penicillins Hives     Medication List with Changes/Refills   New Medications    CEFDINIR (OMNICEF) 300 MG CAPSULE    Take 1 capsule (300 mg total) by mouth 2 (two) times daily. for 10 days   Current Medications    ALBUTEROL-IPRATROPIUM (DUO-NEB) 2.5 MG-0.5 MG/3 ML NEBULIZER SOLUTION    Take 3 mLs by nebulization every 4 (four) hours as needed for Wheezing or Shortness of Breath.    AMLODIPINE-BENAZEPRIL 10-20MG (LOTREL) 10-20 MG PER CAPSULE    Take 1 capsule by mouth once daily.    ANORO ELLIPTA 62.5-25 MCG/ACTUATION DSDV    Inhale 1 puff into the lungs once daily.    ATORVASTATIN (LIPITOR) 40 MG TABLET    Take 1 tablet (40 mg total) by mouth once daily.    CALCIUM CARBONATE (OS-FREDY) 500 MG CALCIUM (1,250 MG) TABLET    Take 1 tablet (500 mg total) by mouth once daily.    LEVOTHYROXINE (SYNTHROID) 50 MCG TABLET    Take 1 tablet (50 mcg total) by mouth before breakfast.    METFORMIN (GLUCOPHAGE) 1000 MG TABLET    Take 1 tablet (1,000 mg total) by mouth 2 (two) times daily with meals.    VENTOLIN HFA 90 MCG/ACTUATION INHALER    Inhale 1-2 puffs into the lungs as needed for Wheezing or Shortness of Breath.      Social History     Tobacco Use    Smoking status: Current Some Day Smoker    Smokeless tobacco: Never Used   Substance Use Topics    Alcohol use: Never     Frequency: Never      Family History   Family history unknown: Yes       I have reviewed the complete PMH, social history, surgical history, allergies and medications.  As well as family history.    Review of Systems   Constitutional: Negative for activity change and unexpected weight change.   HENT: Positive for ear pain. Negative for hearing loss, rhinorrhea and trouble swallowing.    Eyes: Negative for discharge and visual disturbance.   Respiratory: Positive for wheezing. Negative for chest tightness.    Cardiovascular: Negative for chest pain and palpitations.   Gastrointestinal: Negative for blood in stool,  constipation, diarrhea and vomiting.   Endocrine: Negative for polydipsia and polyuria.   Genitourinary: Negative for decreased urine volume, difficulty urinating, dysuria, flank pain, frequency, hematuria, menstrual problem, urgency, vaginal bleeding, vaginal discharge and vaginal pain.   Musculoskeletal: Positive for arthralgias and joint swelling. Negative for neck pain.   Skin: Negative for rash.   Allergic/Immunologic: Negative for environmental allergies.   Neurological: Negative for weakness and headaches.   Psychiatric/Behavioral: Negative for confusion and dysphoric mood.       Objective:     /80   Pulse 88   Temp 98.2 °F (36.8 °C) (Oral)   Wt 81.4 kg (179 lb 6.4 oz)   BMI 33.90 kg/m²     Physical Exam   Constitutional: She is oriented to person, place, and time. She appears well-developed and well-nourished. No distress.   HENT:   Head: Normocephalic and atraumatic.   Right Ear: No tenderness. Tympanic membrane is bulging. Tympanic membrane is not injected and not erythematous. A middle ear effusion is present.   Left Ear: There is tenderness. Tympanic membrane is injected, scarred, erythematous and bulging. A middle ear effusion is present.   Eyes: Pupils are equal, round, and reactive to light. EOM are normal.   Neck: Normal range of motion. Neck supple.   Cardiovascular: Normal rate, regular rhythm, normal heart sounds and intact distal pulses.   No murmur heard.  Pulmonary/Chest: Effort normal and breath sounds normal. No respiratory distress. She has no wheezes.   Musculoskeletal: Normal range of motion. She exhibits no edema.   Neurological: She is alert and oriented to person, place, and time. No cranial nerve deficit.   Skin: Skin is warm and dry. Capillary refill takes less than 2 seconds.   Psychiatric: She has a normal mood and affect. Her behavior is normal.   Nursing note and vitals reviewed.      Assessment:     1. Hematuria, unspecified type    2. Macrocytosis without anemia    3.  Left ear pain    4. Protein-calorie malnutrition, unspecified severity     5. Left breast mass    6. Chronic otitis media of both ears        Plan:     I have Reviewed and summarized old records.  I have performed thorough medication reconciliation today and discussed risk and benefits of each medication.  I have reviewed labs and discussed with patient.  All questions were answered.  I am requesting old records and will review them once they are available.    Problem List Items Addressed This Visit     Hematuria - Primary     Referral to Urology.  No gross blood but persistent hematuria on urinalysis.  Patient is heavy smoker.         Relevant Orders    URINALYSIS (Completed)    Ambulatory referral to Urology    Macrocytosis without anemia     Recommend vitamin B12 supplementation.  Vitamin B12 is at lower limits of normal.  Folate is normal.  Recheck CBC and B12 levels in 3 months.  ER precautions were given if have any signs or symptoms of anemia.  All questions were answered.  Patient voiced understanding.    Avoid excessive amounts of alcohol.  Avoid anti-inflammatories.         Relevant Orders    Vitamin B12 (Completed)    Folate (Completed)    Left ear pain     Recommend ENT follow-up.  Will treat with antibiotics.  Flonase.  Stop smoking.  ER precautions were given.         Protein-calorie malnutrition     Vitamin B12 is deficient.  Folate is within normal limits.  Checking levels.  Discussed diet exercise.         Relevant Orders    Vitamin B12 (Completed)    Folate (Completed)    Left breast mass     Reviewed mammogram and ultrasound results.  Recommend 6 months follow-up.  Patient should be scheduled.         Chronic otitis media of both ears     Acute on chronic infection.  Treating with cefdinir.  Patient is penicillin allergic.  Recommended that patient use Flonase over-the-counter.         Relevant Medications    cefdinir (OMNICEF) 300 MG capsule    Other Relevant Orders    Ambulatory referral to ENT           Follow up in about 4 weeks (around 9/17/2019), or if symptoms worsen or fail to improve, for labs and test results.    If no improvement in symptoms or symptoms worsen, advised to call/follow-up at clinic or go to ER. Patient voiced understanding and all questions/concerns were addressed.     DISCLAIMER: This note was compiled by using a speech recognition dictation system and therefore please be aware that typographical / speech recognition errors can and do occur.  Please contact me if you see any errors specifically.    Jose Steinberg MD  We Offer Telehealth & Same Day Appointments!   Book your Telehealth appointment with me through my nurse or   Clinic appointments on [facility]!    Office: [phone]          Check out my Facebook Page and Follow Me at: CLICK HERE    Check out my website at [facility] by clicking on: CLICK HERE    To Schedule appointments online, go to [facility]: CLICK HERE     Location: [email or web address]    [address] [place] [address]    FAX: [phone]

## 2019-08-20 NOTE — PROGRESS NOTES
Please call to make sure patient get the results.    Your urinalysis is abnormal.  Showing persistent microscopic hematuria.  Referral to Urology since smoking history.

## 2019-08-21 ENCOUNTER — TELEPHONE (OUTPATIENT)
Dept: FAMILY MEDICINE | Facility: CLINIC | Age: 69
End: 2019-08-21

## 2019-08-21 DIAGNOSIS — E53.8 VITAMIN B12 DEFICIENCY: ICD-10-CM

## 2019-08-21 DIAGNOSIS — R31.9 HEMATURIA, UNSPECIFIED TYPE: Primary | ICD-10-CM

## 2019-08-21 DIAGNOSIS — E55.9 VITAMIN D DEFICIENCY: Primary | ICD-10-CM

## 2019-08-21 NOTE — ASSESSMENT & PLAN NOTE
Recommend vitamin B12 supplementation.  Vitamin B12 is at lower limits of normal.  Folate is normal.  Recheck CBC and B12 levels in 3 months.  ER precautions were given if have any signs or symptoms of anemia.  All questions were answered.  Patient voiced understanding.    Avoid excessive amounts of alcohol.  Avoid anti-inflammatories.

## 2019-08-21 NOTE — ASSESSMENT & PLAN NOTE
Acute on chronic infection.  Treating with cefdinir.  Patient is penicillin allergic.  Recommended that patient use Flonase over-the-counter.

## 2019-08-21 NOTE — PROGRESS NOTES
Your vitamin B12 level is at the lower limits of normal  I would recommend supplementation with vitamin B12 1000 mcg daily.  Repeat level in 3 months.  Folate level is within normal limits.  '    Please call the patient to make sure that they received the results through [facility].  I have sent a message to them with the interpretation.  See if they have any questions and make a follow-up appointment if not already schedule and if needed.

## 2019-08-21 NOTE — ASSESSMENT & PLAN NOTE
Reviewed mammogram and ultrasound results.  Recommend 6 months follow-up.  Patient should be scheduled.

## 2019-08-21 NOTE — PROGRESS NOTES
"Referring Provider:    Jose Steinberg Md  [address] [facility] [place] [address]  Subjective:   Patient: Kami Higgins [id number], :1950   Visit date:2019 9:11 AM    Chief Complaint:  Other (pt has ear infection in both ears per dr Steinberg)    HPI:  Kami is a 68 y.o. female who I was asked to see in consultation for evaluation of the following issue(s):    Patient first presented to clinic on 19 for an evaluation of recurrent AOM. She saw her PCP for this on 19 and was started on Cefdinir. C/o bilateral otalgia on and off x past 3 mo. Hx multiple PET's as a child. Known hx of bilat tympanic membrane perforations. Increased otorrhea bilaterally over the past week, clear/yellow. No recent audiogram. She does c/o HL bilaterally,"louder noises bother/hurt my ears". No other cold ssx. No relieving factors.       Review of Systems:  Negative unless checked off.  Gen:  []fever   []fatigue  HENT:  []nosebleeds  []dental problem   Eyes:  []photophobia  []visual disturbance  Resp:  []chest tightness []wheezing  Card:  []chest pain  []leg swelling  GI:  []abdominal pain []blood in stool  :  []dysuria  []hematuria  Musc:  []joint swelling  []gait problem  Skin:  []color change  []pallor  Neuro:  []seizures  []numbness  Hem:  []bruise/bleed easily  Psych:  []hallucinations  []behavioral problems  Allergy/Imm: is allergic to aspirin, buffered; codeine; and penicillins.    Her meds, allergies, medical, surgical, social & family histories were reviewed & updated:  -     She has a current medication list which includes the following prescription(s): albuterol-ipratropium, amlodipine-benazepril 10-20mg, anoro ellipta, atorvastatin, calcium carbonate, cefdinir, levothyroxine, metformin, ventolin hfa, and ofloxacin.  -     She  has a past medical history of AAA (abdominal aortic aneurysm) without rupture, COPD (chronic obstructive pulmonary disease), Hyperlipidemia, Hypertension, Pulmonary nodule, and " Thyroid disease.   -     She does not have any pertinent problems on file.   -     She  has a past surgical history that includes Thyroidectomy; Tubal ligation; Hysterectomy; Abdominal aortic aneurysm repair; and cardiac catheterization (2017).  -     She  reports that she has been smoking.  She has never used smokeless tobacco. She reports that she does not drink alcohol or use drugs.  -     Her Family history is unknown by patient.  -     She is allergic to aspirin, buffered; codeine; and penicillins.    Objective:     Physical Exam:  Vitals:  Temp 98.2 °F (36.8 °C) (Tympanic)   Ht 5' (1.524 m)   Wt 81.8 kg (180 lb 6.4 oz)   BMI 35.23 kg/m²   General appearance:  Well developed, well nourished    Eyes:  Extraocular motions intact, PERRL    Communication:  no hoarseness, no dysphonia    Ears:  See exam below  Nose:  No masses/lesions of external nose, nasal mucosa, septum, and turbinates were within normal limits.  Mouth:  No mass/lesion of lips, teeth, gums, hard/soft palate, tongue, tonsils, or oropharynx.    Cardiovascular:  No pedal edema; Radial Pulses +2     Neck & Lymphatics:  No cervical lymphadenopathy, no neck mass/crepitus/ asymmetry, trachea is midline, no thyroid enlargement/tenderness/mass.    Psych: Oriented x3,  Alert with normal mood and affect.     Respiration/Chest:  Symmetric expansion during respiration, normal respiratory effort.    Skin:  Warm and intact. No ulcerations of face, scalp, neck.    Assessment & Plan:   Kami was seen today for other.    Diagnoses and all orders for this visit:    Perforation of both tympanic membranes    Impacted cerumen of left ear    Other infective acute otitis externa of both ears    Other orders  -     ofloxacin (OCUFLOX) 0.3 % ophthalmic solution; Apply 5 drops into both ears twice daily for 7 days.    OTITIS EXTERNA-  Kami has evidence of bilateral otitis externa.  This was visualized after removal of cerumen.  We discussed the need for dry ear  precautions.  For maintenance therapy, I recommend a mixture of distilled vinegar and alcohol.  For acute infection, antibiotic therapy is needed.  For Kami Higgins I recommend:    L Cerumen removed w/o difficulty  Ofloxacin bilaterally x 1 week  Recheck in 1 week. Once infection clears, plan for audiogram.     We discussed her medical conditions, treatments and plan.  Kami should return to clinic if any issues arise (symptoms worsen or persist), otherwise we will see her back in the clinic In 1 week.     Over 25 minutes were spent in face to face contact with the patient with greater than 50% spent discussing their diagnosis and coordination of care.     Thank you for allowing me to participate in the care of Kami.      Lilo Hill PA-C  [facility]   [facility]  [address] [place] [address]  P: [phone]  F: [phone]    Patient: Kami Higgins [id number], :1950  Procedure date:2019  Patient's medications, allergies, past medical, surgical, social and family histories were reviewed and updated as appropriate.  Chief Complaint:  Other (pt has ear infection in both ears per dr Steinberg)    HPI:  Kami is a 68 y.o. female with the history of present illness as discussed in the clinic note from today.  During this unrelated patient encounter I observed impacted cerumen.  The otoscopic examination of the tympanic membrane was not possible due to copious cerumen impaction.      Procedure: The patient was in agreement with the examination and debridement of the ears. Removal of the cerumen required a high level of expertise and use of multiple micro-instruments including a size 7 suction and small forceps.     With the patient in the supine position, we used the operating microscope to examine both ears with the appropriate sized ear speculum.  A variety of sterile, micro-instruments were utilized to remove the cerumen atraumatically.  I performed the  procedure which required a significant amount of time and effort. The tympanic membrane was then well visualized.  The patient tolerated the procedure well and there were no complications.    Findings:   Right ear had no wax, the EAC was erythematous, swollen and inflamed, and the tympanic membrane small central perforation ~10%.    Left ear had significant wax, the EAC was erythematous, swollen, discharge present and inflamed, and the tympanic membrane small central perforation present ~10%.

## 2019-08-21 NOTE — ASSESSMENT & PLAN NOTE
Referral to Urology.  No gross blood but persistent hematuria on urinalysis.  Patient is heavy smoker.

## 2019-08-21 NOTE — TELEPHONE ENCOUNTER
----- Message from Jose Steinberg MD sent at 8/20/2019 11:25 AM CDT -----  Please call to make sure patient get the results.    Your urinalysis is abnormal.  Showing persistent microscopic hematuria.  Referral to Urology since smoking history.

## 2019-08-21 NOTE — TELEPHONE ENCOUNTER
----- Message from Jose Steinberg MD sent at 8/20/2019  9:54 PM CDT -----  Your vitamin B12 level is at the lower limits of normal  I would recommend supplementation with vitamin B12 1000 mcg daily.  Repeat level in 3 months.  Folate level is within normal limits.  '    Please call the patient to make sure that they received the results through [employer].  I have sent a message to them with the interpretation.  See if they have any questions and make a follow-up appointment if not already schedule and if needed.

## 2019-08-21 NOTE — ASSESSMENT & PLAN NOTE
Vitamin B12 is deficient.  Folate is within normal limits.  Checking levels.  Discussed diet exercise.

## 2019-08-23 ENCOUNTER — OFFICE VISIT (OUTPATIENT)
Dept: OTOLARYNGOLOGY | Facility: CLINIC | Age: 69
End: 2019-08-23
Payer: MEDICARE

## 2019-08-23 VITALS — HEIGHT: 60 IN | WEIGHT: 180.38 LBS | TEMPERATURE: 98 F | BODY MASS INDEX: 35.41 KG/M2

## 2019-08-23 DIAGNOSIS — H60.393 OTHER INFECTIVE ACUTE OTITIS EXTERNA OF BOTH EARS: ICD-10-CM

## 2019-08-23 DIAGNOSIS — H72.93 PERFORATION OF BOTH TYMPANIC MEMBRANES: Primary | ICD-10-CM

## 2019-08-23 DIAGNOSIS — H61.22 IMPACTED CERUMEN OF LEFT EAR: ICD-10-CM

## 2019-08-23 PROCEDURE — 99204 PR OFFICE/OUTPT VISIT, NEW, LEVL IV, 45-59 MIN: ICD-10-PCS | Mod: 25,S$PBB,, | Performed by: PHYSICIAN ASSISTANT

## 2019-08-23 PROCEDURE — 69210 REMOVE IMPACTED EAR WAX UNI: CPT | Mod: S$PBB,,, | Performed by: PHYSICIAN ASSISTANT

## 2019-08-23 PROCEDURE — 99213 OFFICE O/P EST LOW 20 MIN: CPT | Mod: PBBFAC,PO | Performed by: PHYSICIAN ASSISTANT

## 2019-08-23 PROCEDURE — 99204 OFFICE O/P NEW MOD 45 MIN: CPT | Mod: 25,S$PBB,, | Performed by: PHYSICIAN ASSISTANT

## 2019-08-23 PROCEDURE — 99999 PR PBB SHADOW E&M-EST. PATIENT-LVL III: ICD-10-PCS | Mod: PBBFAC,,, | Performed by: PHYSICIAN ASSISTANT

## 2019-08-23 PROCEDURE — 69210 PR REMOVAL IMPACTED CERUMEN REQUIRING INSTRUMENTATION, UNILATERAL: ICD-10-PCS | Mod: S$PBB,,, | Performed by: PHYSICIAN ASSISTANT

## 2019-08-23 PROCEDURE — 99999 PR PBB SHADOW E&M-EST. PATIENT-LVL III: CPT | Mod: PBBFAC,,, | Performed by: PHYSICIAN ASSISTANT

## 2019-08-23 PROCEDURE — 69210 REMOVE IMPACTED EAR WAX UNI: CPT | Mod: PBBFAC,PO | Performed by: PHYSICIAN ASSISTANT

## 2019-08-23 RX ORDER — OFLOXACIN 3 MG/ML
SOLUTION/ DROPS OPHTHALMIC
Qty: 1 BOTTLE | Refills: 0 | Status: SHIPPED | OUTPATIENT
Start: 2019-08-23 | End: 2019-09-06 | Stop reason: ALTCHOICE

## 2019-08-23 NOTE — LETTER
August 23, 2019      Jose Steinberg MD  [address]  [place] [address]           [place] [facility]  [address]  [place] [address]  Phone: [phone]  Fax: [phone]          Patient: Kami Higgins   MR Number: [id number]   YOB: 1950   Date of Visit: 8/23/2019       Dear Dr. Jose Steinberg:    Thank you for referring Kami Higgins to me for evaluation. Attached you will find relevant portions of my assessment and plan of care.    If you have questions, please do not hesitate to call me. I look forward to following Kami Higgins along with you.    Sincerely,    Lilo Hill PA-C    Enclosure  CC:  No Recipients    If you would like to receive this communication electronically, please contact [email or web address] or [phone] to request more information on [employer] access.    For providers and/or their staff who would like to refer a patient to [facility], please contact us through our one-stop-shop provider referral line, [facility], at [phone].    If you feel you have received this communication in error or would no longer like to receive these types of communications, please e-mail [email or web address]

## 2019-09-05 NOTE — PROGRESS NOTES
"Subjective:   Patient: Kami Higgins [id number], :1950   Visit date:2019 1:27 PM    Chief Complaint:  Otalgia (left ear still hurts) and Ear Drainage (right ear)    HPI:  Kami is a 68 y.o. female who is here for follow-up. Patient first presented to clinic on 19 for an evaluation of recurrent AOM. She saw her PCP for this on 19 and was started on Cefdinir. C/o bilateral otalgia on and off x past 3 mo. Hx multiple PET's as a child. Known hx of bilat tympanic membrane perforations. Increased otorrhea bilaterally over the past week, clear/yellow. No recent audiogram. She does c/o HL bilaterally,"louder noises bother/hurt my ears". PE was significant for AOE and pt tx with Ofloxacin drops. She rtc on 19 and reported resolution of otorrhea, however, she c/o persistent otalgia worse AS. No other new ssx. No other relieving factors.       Review of Systems:  -     Allergic/Immunologic: is allergic to aspirin, buffered; codeine; and penicillins..  -     Constitutional: Current temp: 97.8 °F (36.6 °C) (Oral)    Her meds, allergies, medical, surgical, social & family histories were reviewed & updated:  -     She has a current medication list which includes the following prescription(s): albuterol-ipratropium, amlodipine-benazepril 10-20mg, anoro ellipta, atorvastatin, calcium carbonate, levothyroxine, metformin, ventolin hfa, and clindamycin.  -     She  has a past medical history of AAA (abdominal aortic aneurysm) without rupture, COPD (chronic obstructive pulmonary disease), Hyperlipidemia, Hypertension, Pulmonary nodule, and Thyroid disease.   -     She does not have any pertinent problems on file.   -     She  has a past surgical history that includes Thyroidectomy; Tubal ligation; Hysterectomy; Abdominal aortic aneurysm repair; and cardiac catheterization (2017).  -     She  reports that she has been smoking.  She has never used smokeless tobacco. She reports that she does not drink alcohol " or use drugs.  -     Her Family history is unknown by patient.  -     She is allergic to aspirin, buffered; codeine; and penicillins.    Objective:     Physical Exam:  Vitals:  /77   Pulse 82   Temp 97.8 °F (36.6 °C) (Oral)   Wt 81.2 kg (179 lb)   BMI 34.96 kg/m²   Appearance:  Well-developed, well-nourished.  Communication:  Able to communicate, no hoarseness.  Head & Face:  Normocephalic, atraumatic, no sinus tenderness, normal facial strength.  Eyes:  Extraocular motions intact.  Ears:  Bilateral TM perforations present, ~10%, TM's opaque with central erythema. Mild erythema in EAC  Nose:  No masses/lesions of external nose, nasal mucosa, septum, and turbinates were within normal limits.  Mouth:  No mass/lesion of lips, teeth, gums, hard/soft palate, tongue, tonsils, or oropharynx.  Neck & Lymphatics:  No cervical lymphadenopathy, no neck mass/crepitus/ asymmetry, trachea is midline, no thyroid enlargement/tenderness/mass.  Neuro/Psych: Alert with normal mood and affect.   Abdominal: Normal appearance.   Respiration/Chest:  Symmetric expansion during respiration, normal respiratory effort.  Skin:  Warm and intact  Cardiovascular:  No peripheral vascular edema or varicosities.    Assessment & Plan:   Kami was seen today for otalgia and ear drainage.    Diagnoses and all orders for this visit:    Bilateral otitis media, unspecified otitis media type    Other orders  -     clindamycin (CLEOCIN) 300 MG capsule; Take 1 capsule (300 mg total) by mouth every 8 (eight) hours. for 10 days    Clindamycin x 10 days  Plan to recheck in 1 week. Once resolved, plan for audiogram.           Lilo Hill PA-C  [facility]   [facility]  [address]  [place] [address]  P: [phone]  F: [phone]

## 2019-09-06 ENCOUNTER — OFFICE VISIT (OUTPATIENT)
Dept: OTOLARYNGOLOGY | Facility: CLINIC | Age: 69
End: 2019-09-06
Payer: MEDICARE

## 2019-09-06 ENCOUNTER — TELEPHONE (OUTPATIENT)
Dept: FAMILY MEDICINE | Facility: CLINIC | Age: 69
End: 2019-09-06

## 2019-09-06 VITALS
DIASTOLIC BLOOD PRESSURE: 77 MMHG | HEART RATE: 82 BPM | SYSTOLIC BLOOD PRESSURE: 121 MMHG | TEMPERATURE: 98 F | BODY MASS INDEX: 34.96 KG/M2 | WEIGHT: 179 LBS

## 2019-09-06 DIAGNOSIS — H66.93 BILATERAL OTITIS MEDIA, UNSPECIFIED OTITIS MEDIA TYPE: Primary | ICD-10-CM

## 2019-09-06 DIAGNOSIS — R19.5 POSITIVE COLORECTAL CANCER SCREENING USING COLOGUARD TEST: Primary | ICD-10-CM

## 2019-09-06 PROCEDURE — 99213 OFFICE O/P EST LOW 20 MIN: CPT | Mod: S$PBB,,, | Performed by: PHYSICIAN ASSISTANT

## 2019-09-06 PROCEDURE — 99213 PR OFFICE/OUTPT VISIT, EST, LEVL III, 20-29 MIN: ICD-10-PCS | Mod: S$PBB,,, | Performed by: PHYSICIAN ASSISTANT

## 2019-09-06 PROCEDURE — 99999 PR PBB SHADOW E&M-EST. PATIENT-LVL III: CPT | Mod: PBBFAC,,, | Performed by: PHYSICIAN ASSISTANT

## 2019-09-06 PROCEDURE — 99999 PR PBB SHADOW E&M-EST. PATIENT-LVL III: ICD-10-PCS | Mod: PBBFAC,,, | Performed by: PHYSICIAN ASSISTANT

## 2019-09-06 PROCEDURE — 99213 OFFICE O/P EST LOW 20 MIN: CPT | Mod: PBBFAC,PO | Performed by: PHYSICIAN ASSISTANT

## 2019-09-06 RX ORDER — CLINDAMYCIN HYDROCHLORIDE 300 MG/1
300 CAPSULE ORAL EVERY 8 HOURS
Qty: 30 CAPSULE | Refills: 0 | Status: SHIPPED | OUTPATIENT
Start: 2019-09-06 | End: 2019-09-16

## 2019-09-20 ENCOUNTER — HOSPITAL ENCOUNTER (OUTPATIENT)
Dept: RADIOLOGY | Facility: HOSPITAL | Age: 69
Discharge: HOME | End: 2019-09-20
Attending: FAMILY MEDICINE
Payer: MEDICARE

## 2019-09-20 ENCOUNTER — OFFICE VISIT (OUTPATIENT)
Dept: FAMILY MEDICINE | Facility: CLINIC | Age: 69
End: 2019-09-20
Payer: MEDICARE

## 2019-09-20 VITALS
BODY MASS INDEX: 34.87 KG/M2 | WEIGHT: 177.63 LBS | HEIGHT: 60 IN | SYSTOLIC BLOOD PRESSURE: 110 MMHG | HEART RATE: 70 BPM | TEMPERATURE: 98 F | DIASTOLIC BLOOD PRESSURE: 70 MMHG

## 2019-09-20 DIAGNOSIS — M79.89 SWELLING OF FINGER, RIGHT: ICD-10-CM

## 2019-09-20 DIAGNOSIS — I10 HYPERTENSION, ESSENTIAL: ICD-10-CM

## 2019-09-20 DIAGNOSIS — E53.8 B12 DEFICIENCY: ICD-10-CM

## 2019-09-20 DIAGNOSIS — Z23 NEED FOR PNEUMOCOCCAL VACCINATION: ICD-10-CM

## 2019-09-20 DIAGNOSIS — R19.5 POSITIVE COLORECTAL CANCER SCREENING USING COLOGUARD TEST: Primary | ICD-10-CM

## 2019-09-20 PROCEDURE — 73140 XR FINGER 2 OR MORE VIEWS: ICD-10-PCS | Mod: 26,RT,, | Performed by: RADIOLOGY

## 2019-09-20 PROCEDURE — 99214 OFFICE O/P EST MOD 30 MIN: CPT | Mod: PBBFAC,25,PO | Performed by: FAMILY MEDICINE

## 2019-09-20 PROCEDURE — 99214 PR OFFICE/OUTPT VISIT, EST, LEVL IV, 30-39 MIN: ICD-10-PCS | Mod: S$PBB,,, | Performed by: FAMILY MEDICINE

## 2019-09-20 PROCEDURE — 73140 X-RAY EXAM OF FINGER(S): CPT | Mod: 26,RT,, | Performed by: RADIOLOGY

## 2019-09-20 PROCEDURE — G0009 ADMIN PNEUMOCOCCAL VACCINE: HCPCS | Mod: PBBFAC,PO

## 2019-09-20 PROCEDURE — 99214 OFFICE O/P EST MOD 30 MIN: CPT | Mod: S$PBB,,, | Performed by: FAMILY MEDICINE

## 2019-09-20 PROCEDURE — 99999 PR PBB SHADOW E&M-EST. PATIENT-LVL IV: ICD-10-PCS | Mod: PBBFAC,,, | Performed by: FAMILY MEDICINE

## 2019-09-20 PROCEDURE — 73140 X-RAY EXAM OF FINGER(S): CPT | Mod: TC,PO

## 2019-09-20 PROCEDURE — 99999 PR PBB SHADOW E&M-EST. PATIENT-LVL IV: CPT | Mod: PBBFAC,,, | Performed by: FAMILY MEDICINE

## 2019-09-20 RX ORDER — VITAMIN B COMPLEX
1 CAPSULE ORAL DAILY
COMMUNITY

## 2019-09-20 NOTE — PROGRESS NOTES
Subjective:      Patient ID: Kami Higgins is a 68 y.o. female.    Chief Complaint: Follow-up (review labs)      Problem List Items Addressed This Visit     Hypertension, essential    Overview     Initial HPI:  Chronic.  Stable.  Well controlled today.  On amlodipine benazepril combination.  Reports compliance.  No side effects reported.  Denies any chest pain shortness of breath blurred vision.    September 20th 2019  Blood pressure stable today.  Patient taking amlodipine benazepril combo.  Reports compliance.  No side effects reported.         Current Assessment & Plan     Continue blood pressure medicine.  Continue to monitor.    Discussed hypertension disease course.  Discussed-DASH-diet and exercise.  Discussed medication regimen importance of treating high blood pressure.  Patient understood and advised of risk of untreated blood pressure.  ER precautions were given for symptoms of hypertensive urgency and emergency.           Positive colorectal cancer screening using Cologuard test - Primary    Overview     Patient has a positive Cologuard.  Patient has not seen GI.  Patient has not had a colonoscopy.  Patient reports issues with anesthesia which will need to be further evaluated by the specialist.  Patient may need other tests such is a imaging study or other test modality.         Current Assessment & Plan     Patient reports that she is high risk for anesthesia.  Need to investigate further what the actual issue was.  Patient does have a positive Cologuard which needs further evaluation for colon cancer.         B12 deficiency    Overview     Lab Results   Component Value Date    [id number] 267 08/20/2019     Chronic.  Untreated.  Patient has not been on B12 supplementation.             Current Assessment & Plan     B12 level was lower limits of normal.  Recommend starting supplement with vitamin B complex multivitamin.  Recheck level in a few months.         Swelling of finger, right    Overview      Right middle finger is swollen.  Patient reports this is a acute on chronic situation.  She has not had an x-ray.  Patient reports that the finger does hurt mildly.    X-Ray Finger 2 or More Views  Narrative: EXAMINATION:  XR FINGER 2 OR MORE VIEWS    CLINICAL HISTORY:  RIGHT MIDDLE FINGER SWELLING;  Other specified soft tissue disorders    TECHNIQUE:  Three views 3rd digit right hand    COMPARISON:  None    FINDINGS:  No acute fracture or dislocation.  No osseous erosion.  Soft tissue swelling about the 3rd digit is noted.  No radiopaque foreign body.  Impression: As above    Electronically signed by: Arben Cates MD  Date:    09/20/2019  Time:    09:43               Current Assessment & Plan     No abnormality on x-ray.  Patient advised to take over-the-counter pain reliever.  Follow-up with me if no improvement.  Soak in Epsom salt recommended.  ER precautions given.         Need for pneumococcal vaccination    Overview     Do for pneumonia vaccination after 65.                Past Medical History:  Past Medical History:   Diagnosis Date    AAA (abdominal aortic aneurysm) without rupture     COPD (chronic obstructive pulmonary disease)     Hyperlipidemia     Hypertension     Pulmonary nodule     Thyroid disease      Past Surgical History:   Procedure Laterality Date    ABDOMINAL AORTIC ANEURYSM REPAIR      cardiac catheterization  2017    HYSTERECTOMY      THYROIDECTOMY      TUBAL LIGATION       Review of patient's allergies indicates:   Allergen Reactions    Aspirin, buffered     Codeine     Penicillins Hives     Medication List with Changes/Refills   Current Medications    ALBUTEROL-IPRATROPIUM (DUO-NEB) 2.5 MG-0.5 MG/3 ML NEBULIZER SOLUTION    Take 3 mLs by nebulization every 4 (four) hours as needed for Wheezing or Shortness of Breath.    AMLODIPINE-BENAZEPRIL 10-20MG (LOTREL) 10-20 MG PER CAPSULE    Take 1 capsule by mouth once daily.    ANORO ELLIPTA 62.5-25 MCG/ACTUATION DSDV    Inhale 1  puff into the lungs once daily.    ATORVASTATIN (LIPITOR) 40 MG TABLET    Take 1 tablet (40 mg total) by mouth once daily.    B COMPLEX VITAMINS CAPSULE    Take 1 capsule by mouth once daily.    CALCIUM CARBONATE (OS-FREDY) 500 MG CALCIUM (1,250 MG) TABLET    Take 1 tablet (500 mg total) by mouth once daily.    LEVOTHYROXINE (SYNTHROID) 50 MCG TABLET    Take 1 tablet (50 mcg total) by mouth before breakfast.    METFORMIN (GLUCOPHAGE) 1000 MG TABLET    Take 1 tablet (1,000 mg total) by mouth 2 (two) times daily with meals.    VENTOLIN HFA 90 MCG/ACTUATION INHALER    Inhale 1-2 puffs into the lungs as needed for Wheezing or Shortness of Breath.      Social History     Tobacco Use    Smoking status: Current Some Day Smoker    Smokeless tobacco: Never Used   Substance Use Topics    Alcohol use: Never     Frequency: Never      Family History   Family history unknown: Yes       I have reviewed the complete PMH, social history, surgical history, allergies and medications.  As well as family history.    Review of Systems   Constitutional: Negative for activity change and fatigue.   HENT: Negative for congestion and sinus pain.    Eyes: Negative for visual disturbance.   Respiratory: Negative for chest tightness and shortness of breath.    Cardiovascular: Negative for palpitations and leg swelling.   Gastrointestinal: Negative for abdominal pain, diarrhea and nausea.   Endocrine: Negative for polyuria.   Genitourinary: Negative for difficulty urinating and frequency.   Musculoskeletal: Negative for arthralgias and joint swelling.        Right middle finger is swollen and tender.   Skin: Negative for rash.   Neurological: Negative for dizziness and headaches.   Psychiatric/Behavioral: Negative for agitation. The patient is not nervous/anxious.        Objective:     /70   Pulse 70   Temp 98 °F (36.7 °C) (Oral)   Ht 5' (1.524 m)   Wt 80.6 kg (177 lb 9.6 oz)   BMI 34.69 kg/m²     Physical Exam   Constitutional: She  is oriented to person, place, and time. She appears well-developed and well-nourished. No distress.   HENT:   Head: Normocephalic and atraumatic.   Eyes: Pupils are equal, round, and reactive to light. EOM are normal.   Neck: Normal range of motion. Neck supple.   Cardiovascular: Normal rate, regular rhythm, normal heart sounds and intact distal pulses.   No murmur heard.  Pulmonary/Chest: Effort normal and breath sounds normal. No respiratory distress. She has no wheezes.   Abdominal: Soft. Bowel sounds are normal. She exhibits no distension.   Musculoskeletal: Normal range of motion. She exhibits no edema.   Right middle finger is swollen and had slightly tender to palpation.  No signs of infection currently.   Neurological: She is alert and oriented to person, place, and time. No cranial nerve deficit.   Skin: Skin is warm and dry. Capillary refill takes less than 2 seconds.   Psychiatric: She has a normal mood and affect. Her behavior is normal.   Nursing note and vitals reviewed.      Assessment:     1. Positive colorectal cancer screening using Cologuard test    2. B12 deficiency    3. Swelling of finger, right    4. Hypertension, essential    5. Need for pneumococcal vaccination        Plan:     I have Reviewed and summarized old records.  I have performed thorough medication reconciliation today and discussed risk and benefits of each medication.  I have reviewed labs and discussed with patient.  All questions were answered.  I am requesting old records and will review them once they are available.    Problem List Items Addressed This Visit     Hypertension, essential     Continue blood pressure medicine.  Continue to monitor.    Discussed hypertension disease course.  Discussed-DASH-diet and exercise.  Discussed medication regimen importance of treating high blood pressure.  Patient understood and advised of risk of untreated blood pressure.  ER precautions were given for symptoms of hypertensive urgency  and emergency.           Positive colorectal cancer screening using Cologuard test - Primary     Patient reports that she is high risk for anesthesia.  Need to investigate further what the actual issue was.  Patient does have a positive Cologuard which needs further evaluation for colon cancer.         B12 deficiency     B12 level was lower limits of normal.  Recommend starting supplement with vitamin B complex multivitamin.  Recheck level in a few months.         Swelling of finger, right     No abnormality on x-ray.  Patient advised to take over-the-counter pain reliever.  Follow-up with me if no improvement.  Soak in Epsom salt recommended.  ER precautions given.         Relevant Orders    X-Ray Finger 2 or More Views (Completed)    Need for pneumococcal vaccination    Relevant Orders    Pneumococcal Conjugate Vaccine (13 Valent) (IM) (Completed)          Follow up in about 3 months (around 12/20/2019), or if symptoms worsen or fail to improve, for Referral follow up.    If no improvement in symptoms or symptoms worsen, advised to call/follow-up at clinic or go to ER. Patient voiced understanding and all questions/concerns were addressed.     DISCLAIMER: This note was compiled by using a speech recognition dictation system and therefore please be aware that typographical / speech recognition errors can and do occur.  Please contact me if you see any errors specifically.    Jose Steinberg MD  We Offer Telehealth & Same Day Appointments!   Book your Telehealth appointment with me through my nurse or   Clinic appointments on [employer]!    Office: [phone]          Check out my Facebook Page and Follow Me at: CLICK HERE    Check out my website at [employer] by clicking on: CLICK HERE    To Schedule appointments online, go to [employer]: CLICK HERE     Location: [email or web address]    [address] [place] [address]    FAX: [phone]

## 2019-09-20 NOTE — PROGRESS NOTES
Please call patient with x-ray results.    No fracture or bony lesions seen.  Soft tissue swelling is seen in the right middle finger.  Unknown etiology at this time.  If not improving or getting worse follow-up sooner.  Will monitor.

## 2019-09-20 NOTE — PATIENT INSTRUCTIONS
Vitamin B-12  Does this test have other names?  VB12, serum cobalamin  What is this test?  This test measures the level of vitamin B-12 in your blood. You need this vitamin to make red blood cells and for your nervous system to function as it should.  You get vitamin B-12 from eating foods that come from animals, such as meat, eggs, and dairy products. Vitamin B-12 is also added to some cereals. You can also take this vitamin as a supplement in pill form.  Why do I need this test?  You may need this test if your healthcare provider suspects that your vitamin B-12 level is low. A low level of vitamin B-12 is called vitamin B-12 deficiency. You are more likely to have vitamin deficiency if you are an older adult, have a digestive disorder called malabsorption, have had gastrointestinal surgery, or eat a vegan diet. Women who are pregnant or breastfeeding and eat a vegetarian-type diet are at high risk for this deficiency.  You may also need this test if you've been diagnosed with or your healthcare provider suspects a disease called pernicious anemia. Pernicious anemia affects the lining of your stomach and makes it hard to absorb vitamin B-12.  These are common symptoms of vitamin B-12 deficiency:  · Fatigue  · Weakness  · Weight loss  · Tingling or numbness of the hands and feet  · Constipation  · Poor balance  · Confusion  · Depression  · Memory loss  · Soreness of the mouth or tongue  What other tests might I have along with this test?  Your healthcare provider may order other tests to help find out the cause of your vitamin B-12 deficiency. These tests may include:  · Complete blood count  · Peripheral blood smear, which involves looking at your blood cells under a microscope  · Folic acid level. This vitamin is also important for red blood cell production.  What do my test results mean?  Many things may affect your lab test results. These include the method each lab uses to do the test. Even if your test  results are different from the normal value, you may not have a problem. To learn what the results mean for you, talk with your healthcare provider.  Vitamin B-12 is measured in picograms per milliliter (pg/mL). Normal results are:  · 200 to 835 pg/mL for adults  · 160 to 1,300 pg/mL for newborns  If your results are low, you may have:  · Pernicious anemia  · Malabsorption from inflammatory bowel disease or other causes  · Poor absorption because of surgery  · Tapeworm infection  · Too little intake of animal protein  · Folic acid deficiency  · Iron deficiency  If your levels are high, you may have:  · Liver or kidney disease  · Diabetes  · Obesity  · White blood cell cancer  High levels may also mean that you have chronic obstructive pulmonary disease , congestive heart failure, or a thickening of the blood called polycythemia vera.  How is this test done?  The test requires a blood sample, which is drawn through a needle from a vein in your arm.  Does this test pose any risks?  Taking a blood sample with a needle carries risks that include bleeding, infection, bruising, or feeling dizzy. When the needle pricks your arm, you may feel a slight stinging sensation or pain. Afterward, the site may be slightly sore.  What might affect my test results?  Certain conditions may affect your test results. These include:  · Pregnancy  · Recent blood transfusions  · Smoking  Medicines in general may also affect your results. Specific medicines include supplements of vitamin A or C and birth control pills.  How do I get ready for this test?  You must fast before this test. You may be able to drink water, but you should not eat or drink anything else after midnight on the night before the test. If you are not having the test in the morning, ask your healthcare provider how long you need to fast before the test. You should not have a vitamin B-12 injection before the test. Also be sure your provider knows about all medicines,  herbs, vitamins, and supplements you are taking. This includes medicines that don't need a prescription and any illicit drugs you may use.  © 2933-8966 [employer]. [address] [place] [address], [place] [address]. All rights reserved. This information is not intended as a substitute for professional medical care. Always follow your healthcare professional's instructions.

## 2019-09-22 NOTE — ASSESSMENT & PLAN NOTE
Patient has appointment with Gastroenterology next week.  She was strongly advised to keep that appointment.  Patient reports that she is high risk for anesthesia.  Need to investigate further what the actual issue was.  Patient does have a positive Cologuard which needs further evaluation for colon cancer.

## 2019-09-22 NOTE — ASSESSMENT & PLAN NOTE
Continue blood pressure medicine.  Continue to monitor.    Discussed hypertension disease course.  Discussed-DASH-diet and exercise.  Discussed medication regimen importance of treating high blood pressure.  Patient understood and advised of risk of untreated blood pressure.  ER precautions were given for symptoms of hypertensive urgency and emergency.

## 2019-09-22 NOTE — ASSESSMENT & PLAN NOTE
B12 level was lower limits of normal.  Recommend starting supplement with vitamin B complex multivitamin.  Recheck level in a few months.

## 2019-09-22 NOTE — ASSESSMENT & PLAN NOTE
No abnormality on x-ray.  Patient advised to take over-the-counter pain reliever.  Follow-up with me if no improvement.  Soak in Epsom salt recommended.  ER precautions given.

## 2019-09-30 NOTE — PROGRESS NOTES
"Subjective:   Patient: Kami Higgins [id number], :1950   Visit date:10/4/2019 1:27 PM    Chief Complaint:  Ear Fullness; Otalgia (left ear); and Ear Drainage (right ear)    HPI:  Kami is a 68 y.o. female who is here for follow-up. Patient first presented to clinic on 19 for an evaluation of recurrent AOM. She saw her PCP for this on 19 and was started on Cefdinir. C/o bilateral otalgia on and off x past 3 mo. Hx multiple PET's as a child. Known hx of bilat tympanic membrane perforations. Increased otorrhea bilaterally over the past week, clear/yellow. No recent audiogram. She does c/o HL bilaterally,"louder noises bother/hurt my ears". PE was significant for AOE and pt tx with Ofloxacin drops. She rtc on 19 and reported resolution of otorrhea, however, she c/o persistent otalgia worse AS. She was tx with Clindamycin x 10 days. She rtc on 10/04/19 and reported return of otorrhea and otalgia bilat. No other cold ssx.      Review of Systems:  -     Allergic/Immunologic: is allergic to aspirin, buffered; codeine; and penicillins..  -     Constitutional: Current temp:      Her meds, allergies, medical, surgical, social & family histories were reviewed & updated:  -     She has a current medication list which includes the following prescription(s): albuterol-ipratropium, amlodipine-benazepril 10-20mg, anoro ellipta, atorvastatin, b complex vitamins, calcium carbonate, levothyroxine, metformin, multivitamin, ventolin hfa, clindamycin, and ofloxacin.  -     She  has a past medical history of AAA (abdominal aortic aneurysm) without rupture, COPD (chronic obstructive pulmonary disease), Hyperlipidemia, Hypertension, Pulmonary nodule, and Thyroid disease.   -     She does not have any pertinent problems on file.   -     She  has a past surgical history that includes Thyroidectomy; Tubal ligation; Hysterectomy; Abdominal aortic aneurysm repair; and cardiac catheterization (2017).  -     She  reports " that she has been smoking. She has been smoking about 0.50 packs per day. She has never used smokeless tobacco. She reports that she does not drink alcohol or use drugs.  -     Her family history is not on file.  -     She is allergic to aspirin, buffered; codeine; and penicillins.    Objective:     Physical Exam:  Vitals:  /73   Pulse 97   Wt 81.6 kg (180 lb)   BMI 35.15 kg/m²   Appearance:  Well-developed, well-nourished.  Communication:  Able to communicate, no hoarseness.  Head & Face:  Normocephalic, atraumatic, no sinus tenderness, normal facial strength.  Eyes:  Extraocular motions intact.  Ears:  Bilateral TM perforations present, ~10%, TM's opaque with central erythema. Mild erythema in EAC, L TM opaque  Nose:  No masses/lesions of external nose, nasal mucosa, septum, and turbinates were within normal limits.  Mouth:  No mass/lesion of lips, teeth, gums, hard/soft palate, tongue, tonsils, or oropharynx.  Neck & Lymphatics:  No cervical lymphadenopathy, no neck mass/crepitus/ asymmetry, trachea is midline, no thyroid enlargement/tenderness/mass.  Neuro/Psych: Alert with normal mood and affect.   Abdominal: Normal appearance.   Respiration/Chest:  Symmetric expansion during respiration, normal respiratory effort.  Skin:  Warm and intact  Cardiovascular:  No peripheral vascular edema or varicosities.    Assessment & Plan:   Kami was seen today for ear fullness, otalgia and ear drainage.    Diagnoses and all orders for this visit:    Bilateral otitis media, unspecified otitis media type    Perforation of both tympanic membranes    Other infective acute otitis externa of both ears    Other orders  -     clindamycin (CLEOCIN) 300 MG capsule; Take 1 capsule (300 mg total) by mouth every 8 (eight) hours. for 10 days  -     ofloxacin (OCUFLOX) 0.3 % ophthalmic solution; Apply 5 drops into each ear twice daily for 7 days      Clindamycin and Floxin, recheck in 2 weeks.        Tiffany Vincent, PA-C [facility]   [facility]  [address] [facility] [address] [facility] [place] [address]  P: [phone]  F: [phone]

## 2019-10-02 ENCOUNTER — OFFICE VISIT (OUTPATIENT)
Dept: GASTROENTEROLOGY | Facility: CLINIC | Age: 69
End: 2019-10-02
Payer: MEDICARE

## 2019-10-02 VITALS
BODY MASS INDEX: 35.37 KG/M2 | SYSTOLIC BLOOD PRESSURE: 119 MMHG | HEIGHT: 60 IN | WEIGHT: 180.13 LBS | DIASTOLIC BLOOD PRESSURE: 73 MMHG | RESPIRATION RATE: 18 BRPM | HEART RATE: 67 BPM

## 2019-10-02 DIAGNOSIS — R19.5 POSITIVE COLORECTAL CANCER SCREENING USING COLOGUARD TEST: Primary | ICD-10-CM

## 2019-10-02 PROCEDURE — 99999 PR PBB SHADOW E&M-EST. PATIENT-LVL IV: ICD-10-PCS | Mod: PBBFAC,,, | Performed by: NURSE PRACTITIONER

## 2019-10-02 PROCEDURE — 99204 PR OFFICE/OUTPT VISIT, NEW, LEVL IV, 45-59 MIN: ICD-10-PCS | Mod: S$PBB,,, | Performed by: NURSE PRACTITIONER

## 2019-10-02 PROCEDURE — 99204 OFFICE O/P NEW MOD 45 MIN: CPT | Mod: S$PBB,,, | Performed by: NURSE PRACTITIONER

## 2019-10-02 PROCEDURE — 99214 OFFICE O/P EST MOD 30 MIN: CPT | Mod: PBBFAC,PO | Performed by: NURSE PRACTITIONER

## 2019-10-02 PROCEDURE — 99999 PR PBB SHADOW E&M-EST. PATIENT-LVL IV: CPT | Mod: PBBFAC,,, | Performed by: NURSE PRACTITIONER

## 2019-10-02 NOTE — LETTER
October 2, 2019      Jose Steinberg MD  [address]  [place] [address]           [facility]  [address] [place] [address]  Phone: [phone]          Patient: Kmai Higgins   MR Number: [id number]   YOB: 1950   Date of Visit: 10/2/2019       Dear Dr. Jose Steinberg:    Thank you for referring Kami Higgins to me for evaluation. Attached you will find relevant portions of my assessment and plan of care.    If you have questions, please do not hesitate to call me. I look forward to following Kami Higgins along with you.    Sincerely,    Nasrin Garcia, NP    Enclosure  CC:  No Recipients    If you would like to receive this communication electronically, please contact [email or web address] or [phone] to request more information on [facility] access.    For providers and/or their staff who would like to refer a patient to [facility], please contact us through our one-stop-shop provider referral line, [facility], at [phone].    If you feel you have received this communication in error or would no longer like to receive these types of communications, please e-mail [email or web address]

## 2019-10-02 NOTE — PROGRESS NOTES
Subjective:       Patient ID: Kami Higgins is a 68 y.o. female, Body mass index is 35.18 kg/m².    Chief Complaint: Other (positive FIT Test)      Patient is new to me. Referred by Dr. Steibnerg for positive Cologuard.    Here for positive Cologuard. Feeling well, no complaints. Denies hematochezia, melena, abdominal pain, constipation, heartburn, dysphagia, or unexpected weight loss. Has occasional bouts of diarrhea since starting Metformin but denies currently. Explained to her that the next step would be a colonoscopy. She voiced concerns about the procedure stating she had issues with anesthesia in the past and takes care of her grandchildren full time. Informed her that anesthesia will review her records and speak with her prior to the procedure or she could have procedure without anesthesia.    Review of Systems   Constitutional: Negative for appetite change, fever and unexpected weight change.   HENT: Negative for trouble swallowing.    Respiratory: Negative for cough and shortness of breath.    Cardiovascular: Negative for chest pain.   Gastrointestinal: Negative for abdominal pain, blood in stool, constipation, diarrhea, nausea and vomiting.   Genitourinary: Negative for difficulty urinating and dysuria.   Musculoskeletal: Negative for gait problem.   Skin: Negative for rash.   Neurological: Negative for speech difficulty.   Psychiatric/Behavioral: Negative for confusion.       Past Medical History:   Diagnosis Date    AAA (abdominal aortic aneurysm) without rupture     COPD (chronic obstructive pulmonary disease)     Hyperlipidemia     Hypertension     Pulmonary nodule     Thyroid disease       Past Surgical History:   Procedure Laterality Date    ABDOMINAL AORTIC ANEURYSM REPAIR      cardiac catheterization  2017    HYSTERECTOMY      THYROIDECTOMY      TUBAL LIGATION        Family History   Problem Relation Age of Onset    Colon cancer Neg Hx     Stomach cancer Neg Hx     Esophageal cancer  Neg Hx       Wt Readings from Last 10 Encounters:   10/02/19 81.7 kg (180 lb 1.9 oz)   09/20/19 80.6 kg (177 lb 9.6 oz)   09/06/19 81.2 kg (179 lb)   08/23/19 81.8 kg (180 lb 6.4 oz)   08/20/19 81.4 kg (179 lb 6.4 oz)   07/30/19 81.2 kg (179 lb)   07/30/19 81.4 kg (179 lb 6.4 oz)     Lab Results   Component Value Date    WBC 9.82 07/30/2019    HGB 15.7 07/30/2019    HCT 49.8 (H) 07/30/2019     (H) 07/30/2019     07/30/2019     CMP  Sodium   Date Value Ref Range Status   07/30/2019 139 136 - 145 mmol/L Final     Potassium   Date Value Ref Range Status   07/30/2019 4.8 3.5 - 5.1 mmol/L Final     Chloride   Date Value Ref Range Status   07/30/2019 103 95 - 110 mmol/L Final     CO2   Date Value Ref Range Status   07/30/2019 27 23 - 29 mmol/L Final     Glucose   Date Value Ref Range Status   07/30/2019 91 70 - 110 mg/dL Final     BUN, Bld   Date Value Ref Range Status   07/30/2019 18 8 - 23 mg/dL Final     Creatinine   Date Value Ref Range Status   07/30/2019 0.8 0.5 - 1.4 mg/dL Final     Calcium   Date Value Ref Range Status   07/30/2019 9.5 8.7 - 10.5 mg/dL Final     Total Protein   Date Value Ref Range Status   07/30/2019 7.5 6.0 - 8.4 g/dL Final     Albumin   Date Value Ref Range Status   07/30/2019 4.1 3.5 - 5.2 g/dL Final     Total Bilirubin   Date Value Ref Range Status   07/30/2019 0.4 0.1 - 1.0 mg/dL Final     Comment:     For infants and newborns, interpretation of results should be based  on gestational age, weight and in agreement with clinical  observations.  Premature Infant recommended reference ranges:  Up to 24 hours.............<8.0 mg/dL  Up to 48 hours............<12.0 mg/dL  3-5 days..................<15.0 mg/dL  6-29 days.................<15.0 mg/dL       Alkaline Phosphatase   Date Value Ref Range Status   07/30/2019 104 55 - 135 U/L Final     AST   Date Value Ref Range Status   07/30/2019 14 10 - 40 U/L Final     ALT   Date Value Ref Range Status   07/30/2019 11 10 - 44 U/L Final      Anion Gap   Date Value Ref Range Status   07/30/2019 9 8 - 16 mmol/L Final     eGFR if    Date Value Ref Range Status   07/30/2019 >60.0 >60 mL/min/1.73 m^2 Final     eGFR if non    Date Value Ref Range Status   07/30/2019 >60.0 >60 mL/min/1.73 m^2 Final     Comment:     Calculation used to obtain the estimated glomerular filtration  rate (eGFR) is the CKD-EPI equation.                 Reviewed prior medical records including endoscopy history (see surgical history).     Objective:      Physical Exam   Constitutional: She is oriented to person, place, and time. She appears well-developed and well-nourished.   HENT:   Head: Normocephalic.   Eyes: Pupils are equal, round, and reactive to light.   Neck: Normal range of motion.   Cardiovascular: Normal rate, regular rhythm and normal heart sounds.   No murmur heard.  Pulmonary/Chest: No respiratory distress. She has decreased breath sounds. She has no wheezes.   Abdominal: Soft. Bowel sounds are normal. She exhibits no distension and no mass. There is no tenderness. There is no guarding.   Musculoskeletal: Normal range of motion.   Neurological: She is alert and oriented to person, place, and time.   Skin: Skin is warm and dry. No rash noted.   Non jaundiced   Psychiatric: She has a normal mood and affect. Her speech is normal.         Assessment:       1. Positive colorectal cancer screening using Cologuard test           Plan:   All diagnoses and orders for this visit:    Positive colorectal cancer screening using Cologuard test   - Discussed about Colonoscopy, including the risks and benefits of colonoscopy, patient verbalized understanding but patient declined colonoscopy. She will think about scheduling it for the summer when she has help with her grandchildren.    If no improvement in symptoms or symptoms worsen, call/follow-up at clinic or go to ER

## 2019-10-04 ENCOUNTER — OFFICE VISIT (OUTPATIENT)
Dept: OTOLARYNGOLOGY | Facility: CLINIC | Age: 69
End: 2019-10-04
Payer: MEDICARE

## 2019-10-04 VITALS
BODY MASS INDEX: 35.15 KG/M2 | HEART RATE: 97 BPM | DIASTOLIC BLOOD PRESSURE: 73 MMHG | SYSTOLIC BLOOD PRESSURE: 124 MMHG | WEIGHT: 180 LBS

## 2019-10-04 DIAGNOSIS — H66.93 BILATERAL OTITIS MEDIA, UNSPECIFIED OTITIS MEDIA TYPE: Primary | ICD-10-CM

## 2019-10-04 DIAGNOSIS — H60.393 OTHER INFECTIVE ACUTE OTITIS EXTERNA OF BOTH EARS: ICD-10-CM

## 2019-10-04 DIAGNOSIS — H72.93 PERFORATION OF BOTH TYMPANIC MEMBRANES: ICD-10-CM

## 2019-10-04 PROCEDURE — 99213 OFFICE O/P EST LOW 20 MIN: CPT | Mod: S$PBB,,, | Performed by: PHYSICIAN ASSISTANT

## 2019-10-04 PROCEDURE — 99213 OFFICE O/P EST LOW 20 MIN: CPT | Mod: PBBFAC,PO | Performed by: PHYSICIAN ASSISTANT

## 2019-10-04 PROCEDURE — 99999 PR PBB SHADOW E&M-EST. PATIENT-LVL III: ICD-10-PCS | Mod: PBBFAC,,, | Performed by: PHYSICIAN ASSISTANT

## 2019-10-04 PROCEDURE — 99213 PR OFFICE/OUTPT VISIT, EST, LEVL III, 20-29 MIN: ICD-10-PCS | Mod: S$PBB,,, | Performed by: PHYSICIAN ASSISTANT

## 2019-10-04 PROCEDURE — 99999 PR PBB SHADOW E&M-EST. PATIENT-LVL III: CPT | Mod: PBBFAC,,, | Performed by: PHYSICIAN ASSISTANT

## 2019-10-04 RX ORDER — OFLOXACIN 3 MG/ML
SOLUTION/ DROPS OPHTHALMIC
Qty: 1 BOTTLE | Refills: 1 | Status: SHIPPED | OUTPATIENT
Start: 2019-10-04 | End: 2021-11-29 | Stop reason: SDUPTHER

## 2019-10-04 RX ORDER — CLINDAMYCIN HYDROCHLORIDE 300 MG/1
300 CAPSULE ORAL EVERY 8 HOURS
Qty: 30 CAPSULE | Refills: 0 | Status: SHIPPED | OUTPATIENT
Start: 2019-10-04 | End: 2019-10-14

## 2020-02-04 ENCOUNTER — TELEPHONE (OUTPATIENT)
Dept: ADMINISTRATIVE | Facility: HOSPITAL | Age: 70
End: 2020-02-04

## 2020-02-04 NOTE — TELEPHONE ENCOUNTER
Pt rescheduled mammogram for a month later (3/12/20) due to not having tranportation at this time. lw

## 2020-03-11 ENCOUNTER — TELEPHONE (OUTPATIENT)
Dept: ADMINISTRATIVE | Facility: HOSPITAL | Age: 70
End: 2020-03-11

## 2020-03-11 NOTE — TELEPHONE ENCOUNTER
Left reminder message with pts grand-daughter (Romina) for her scheduled mammogram appt on Thursday, 3/12/20 in [place]. lw

## 2020-04-22 ENCOUNTER — OFFICE VISIT (OUTPATIENT)
Dept: FAMILY MEDICINE | Facility: CLINIC | Age: 70
End: 2020-04-22
Payer: MEDICARE

## 2020-04-22 DIAGNOSIS — J32.9 SINUSITIS, UNSPECIFIED CHRONICITY, UNSPECIFIED LOCATION: Primary | ICD-10-CM

## 2020-04-22 PROCEDURE — 99441 PR PHYSICIAN TELEPHONE EVALUATION 5-10 MIN: ICD-10-PCS | Mod: 95,,, | Performed by: NURSE PRACTITIONER

## 2020-04-22 PROCEDURE — 99441 PR PHYSICIAN TELEPHONE EVALUATION 5-10 MIN: CPT | Mod: 95,,, | Performed by: NURSE PRACTITIONER

## 2020-04-22 RX ORDER — CETIRIZINE HYDROCHLORIDE 10 MG/1
10 TABLET ORAL DAILY
Qty: 30 TABLET | Refills: 0 | Status: SHIPPED | OUTPATIENT
Start: 2020-04-22 | End: 2020-11-21 | Stop reason: SDUPTHER

## 2020-04-22 RX ORDER — AZITHROMYCIN 250 MG/1
TABLET, FILM COATED ORAL
Qty: 6 TABLET | Refills: 0 | Status: SHIPPED | OUTPATIENT
Start: 2020-04-22 | End: 2020-04-27

## 2020-04-22 NOTE — PROGRESS NOTES
Subjective:       Patient ID: Kami Higgins is a 69 y.o. female.    Chief Complaint: No chief complaint on file.  Established Patient - Audio Only Telehealth Visit     The patient location is: Pt's home  The chief complaint leading to consultation is: Sinus problem  Visit type: Virtual visit with audio only (telephone)     The reason for the audio only service rather than synchronous audio and video virtual visit was related to technical difficulties or patient preference/necessity.     Each patient to whom I provide medical services by telemedicine is:  (1) informed of the relationship between the physician and patient and the respective role of any other health care provider with respect to management of the patient; and (2) notified that they may decline to receive medical services by telemedicine and may withdraw from such care at any time. Patient verbally consented to receive this service via voice-only telephone call.  This service was not originating from a related E/M service provided within the previous 7 days nor will  to an E/M service or procedure within the next 24 hours or my soonest available appointment.  Prevailing standard of care was able to be met in this audio-only visit.    Sinus Problem   This is a new problem. The current episode started in the past 7 days. The problem has been gradually worsening since onset. There has been no fever. The pain is mild. Associated symptoms include congestion, ear pain, headaches, sinus pressure and sneezing. Pertinent negatives include no chills, coughing, diaphoresis, hoarse voice, neck pain, shortness of breath, sore throat or swollen glands. (Facial pressure) Past treatments include nothing. The treatment provided no relief.     Past Medical History:   Diagnosis Date    AAA (abdominal aortic aneurysm) without rupture     COPD (chronic obstructive pulmonary disease)     Hyperlipidemia     Hypertension     Pulmonary nodule     Thyroid disease       Social History     Socioeconomic History    Marital status:      Spouse name: Not on file    Number of children: Not on file    Years of education: Not on file    Highest education level: Not on file   Occupational History    Not on file   Social Needs    Financial resource strain: Somewhat hard    Food insecurity:     Worry: Sometimes true     Inability: Sometimes true    Transportation needs:     Medical: No     Non-medical: No   Tobacco Use    Smoking status: Current Some Day Smoker     Packs/day: 0.50    Smokeless tobacco: Never Used   Substance and Sexual Activity    Alcohol use: Never     Frequency: Never    Drug use: Never    Sexual activity: Not Currently     Partners: Male   Lifestyle    Physical activity:     Days per week: 3 days     Minutes per session: 40 min    Stress: To some extent   Relationships    Social connections:     Talks on phone: More than three times a week     Gets together: Three times a week     Attends Sikhism service: Never     Active member of club or organization: No     Attends meetings of clubs or organizations: Never     Relationship status: Not on file   Other Topics Concern    Not on file   Social History Narrative    Not on file     Past Surgical History:   Procedure Laterality Date    ABDOMINAL AORTIC ANEURYSM REPAIR      cardiac catheterization  2017    HYSTERECTOMY      THYROIDECTOMY      TUBAL LIGATION         Review of Systems   Constitutional: Negative.  Negative for chills and diaphoresis.   HENT: Positive for congestion, ear pain, postnasal drip, rhinorrhea, sinus pressure, sinus pain and sneezing. Negative for hoarse voice and sore throat.    Eyes: Negative.    Respiratory: Negative.  Negative for cough and shortness of breath.    Cardiovascular: Negative.    Gastrointestinal: Negative.    Endocrine: Negative.    Genitourinary: Negative.    Musculoskeletal: Negative.  Negative for neck pain.   Skin: Negative.     Allergic/Immunologic: Negative.    Neurological: Positive for headaches.   Psychiatric/Behavioral: Negative.        Objective:      Physical Exam    Assessment:       1. Sinusitis, unspecified chronicity, unspecified location        Plan:           Diagnoses and all orders for this visit:    Sinusitis, unspecified chronicity, unspecified location  -     azithromycin (Z-SHANA) 250 MG tablet; Take 2 tablets by mouth on day 1; Take 1 tablet by mouth on days 2-5  -     cetirizine (ZYRTEC) 10 MG tablet; Take 1 tablet (10 mg total) by mouth once daily. for 10 days  Report to ER immediately if symptoms worsen

## 2020-04-30 DIAGNOSIS — Z79.899 ENCOUNTER FOR LONG-TERM (CURRENT) USE OF MEDICATIONS: ICD-10-CM

## 2020-04-30 DIAGNOSIS — E11.65 UNCONTROLLED TYPE 2 DIABETES MELLITUS WITH HYPERGLYCEMIA: ICD-10-CM

## 2020-04-30 RX ORDER — METFORMIN HYDROCHLORIDE 1000 MG/1
TABLET ORAL
Qty: 180 TABLET | Refills: 2 | Status: SHIPPED | OUTPATIENT
Start: 2020-04-30 | End: 2020-07-29

## 2020-05-30 DIAGNOSIS — I10 HYPERTENSION, ESSENTIAL: ICD-10-CM

## 2020-05-30 DIAGNOSIS — E78.5 HYPERLIPIDEMIA, UNSPECIFIED HYPERLIPIDEMIA TYPE: ICD-10-CM

## 2020-05-30 DIAGNOSIS — Z79.899 ENCOUNTER FOR LONG-TERM (CURRENT) USE OF MEDICATIONS: ICD-10-CM

## 2020-05-30 DIAGNOSIS — E03.9 HYPOTHYROIDISM, UNSPECIFIED TYPE: ICD-10-CM

## 2020-05-30 RX ORDER — LEVOTHYROXINE SODIUM 50 UG/1
TABLET ORAL
Qty: 90 TABLET | Refills: 2 | Status: SHIPPED | OUTPATIENT
Start: 2020-05-30 | End: 2021-04-25

## 2020-05-30 RX ORDER — ATORVASTATIN CALCIUM 40 MG/1
TABLET, FILM COATED ORAL
Qty: 90 TABLET | Refills: 2 | Status: SHIPPED | OUTPATIENT
Start: 2020-05-30 | End: 2020-06-01 | Stop reason: SDUPTHER

## 2020-05-30 RX ORDER — AMLODIPINE AND BENAZEPRIL HYDROCHLORIDE 10; 20 MG/1; MG/1
CAPSULE ORAL
Qty: 90 CAPSULE | Refills: 2 | Status: SHIPPED | OUTPATIENT
Start: 2020-05-30 | End: 2020-06-01 | Stop reason: SDUPTHER

## 2020-06-01 DIAGNOSIS — Z79.899 ENCOUNTER FOR LONG-TERM (CURRENT) USE OF MEDICATIONS: ICD-10-CM

## 2020-06-01 DIAGNOSIS — E78.5 HYPERLIPIDEMIA, UNSPECIFIED HYPERLIPIDEMIA TYPE: ICD-10-CM

## 2020-06-01 DIAGNOSIS — I10 HYPERTENSION, ESSENTIAL: ICD-10-CM

## 2020-06-01 RX ORDER — ATORVASTATIN CALCIUM 40 MG/1
40 TABLET, FILM COATED ORAL DAILY
Qty: 90 TABLET | Refills: 2 | Status: SHIPPED | OUTPATIENT
Start: 2020-06-01 | End: 2021-11-02 | Stop reason: SDUPTHER

## 2020-06-01 RX ORDER — AMLODIPINE AND BENAZEPRIL HYDROCHLORIDE 10; 20 MG/1; MG/1
1 CAPSULE ORAL DAILY
Qty: 90 CAPSULE | Refills: 2 | Status: SHIPPED | OUTPATIENT
Start: 2020-06-01 | End: 2021-03-26

## 2020-09-01 DIAGNOSIS — J44.9 CHRONIC OBSTRUCTIVE PULMONARY DISEASE, UNSPECIFIED COPD TYPE: ICD-10-CM

## 2020-09-01 DIAGNOSIS — Z79.899 ENCOUNTER FOR LONG-TERM (CURRENT) USE OF MEDICATIONS: ICD-10-CM

## 2020-09-02 RX ORDER — UMECLIDINIUM BROMIDE AND VILANTEROL TRIFENATATE 62.5; 25 UG/1; UG/1
POWDER RESPIRATORY (INHALATION)
Qty: 60 EACH | Refills: 11 | Status: SHIPPED | OUTPATIENT
Start: 2020-09-02 | End: 2022-03-07 | Stop reason: SDUPTHER

## 2020-10-01 ENCOUNTER — PATIENT MESSAGE (OUTPATIENT)
Dept: OTHER | Facility: OTHER | Age: 70
End: 2020-10-01

## 2020-10-28 ENCOUNTER — PATIENT OUTREACH (OUTPATIENT)
Dept: ADMINISTRATIVE | Facility: HOSPITAL | Age: 70
End: 2020-10-28

## 2020-11-21 RX ORDER — CETIRIZINE HYDROCHLORIDE 10 MG/1
10 TABLET ORAL DAILY
Qty: 30 TABLET | Refills: 11 | Status: SHIPPED | OUTPATIENT
Start: 2020-11-21 | End: 2024-01-26

## 2020-11-23 ENCOUNTER — TELEPHONE (OUTPATIENT)
Dept: FAMILY MEDICINE | Facility: CLINIC | Age: 70
End: 2020-11-23

## 2020-11-23 NOTE — TELEPHONE ENCOUNTER
Attempted to call patient, no answer, phone rings continuously and never gives option to leave a message

## 2020-11-23 NOTE — TELEPHONE ENCOUNTER
----- Message from Lizandro Vickers sent at 11/23/2020  3:25 PM CST -----  Regarding: pt  .Type:  Needs Medical Advice    Who Called: pt   Symptoms (please be specific): exposed to granddaughter who has an upper respiratory infection // coughing    How long has patient had these symptoms:  2-3days   Pharmacy name and phone #:  [id number] - [place], [place] - [address] [address]  Phone: [phone] Fax: [phone]      Would the patient rather a call back or a response via [facility]? Call back   Best Call Back Number: .[phone]   Additional Information:       Thank You ,   Lizandro Vickers

## 2020-11-23 NOTE — TELEPHONE ENCOUNTER
----- Message from Lizandro Vickers sent at 11/23/2020  4:27 PM CST -----  Regarding: pt  .Type:  Patient Returning Call    Who Called:pt   Who Left Message for Patient:NURSE   Does the patient know what this is regarding?:yes   Would the patient rather a call back or a response via [facility]? Call back   Best Call Back Number:[phone] (home)   Additional Information:       Thank You ,   Lizandro Vickers

## 2020-11-24 ENCOUNTER — OFFICE VISIT (OUTPATIENT)
Dept: FAMILY MEDICINE | Facility: CLINIC | Age: 70
End: 2020-11-24
Payer: MEDICARE

## 2020-11-24 DIAGNOSIS — H92.01 OTALGIA, RIGHT: ICD-10-CM

## 2020-11-24 DIAGNOSIS — R06.02 SHORTNESS OF BREATH: ICD-10-CM

## 2020-11-24 DIAGNOSIS — J32.9 SINUSITIS, UNSPECIFIED CHRONICITY, UNSPECIFIED LOCATION: Primary | ICD-10-CM

## 2020-11-24 DIAGNOSIS — R05.9 COUGH: ICD-10-CM

## 2020-11-24 DIAGNOSIS — R79.81 LOW O2 SATURATION: ICD-10-CM

## 2020-11-24 DIAGNOSIS — R51.9 HEAD ACHE: ICD-10-CM

## 2020-11-24 PROCEDURE — 99442 PR PHYSICIAN TELEPHONE EVALUATION 11-20 MIN: ICD-10-PCS | Mod: 95,,, | Performed by: NURSE PRACTITIONER

## 2020-11-24 PROCEDURE — 99442 PR PHYSICIAN TELEPHONE EVALUATION 11-20 MIN: CPT | Mod: 95,,, | Performed by: NURSE PRACTITIONER

## 2020-11-24 RX ORDER — BENZONATATE 200 MG/1
200 CAPSULE ORAL 3 TIMES DAILY PRN
Qty: 30 CAPSULE | Refills: 0 | Status: SHIPPED | OUTPATIENT
Start: 2020-11-24 | End: 2020-12-04

## 2020-11-24 RX ORDER — AZITHROMYCIN 250 MG/1
TABLET, FILM COATED ORAL
Qty: 6 TABLET | Refills: 0 | Status: SHIPPED | OUTPATIENT
Start: 2020-11-24 | End: 2020-11-29

## 2020-11-24 NOTE — PATIENT INSTRUCTIONS
ER recommended  Hydrate well  Rest  Tylenol OTC as directed  Instructions for Patients with Confirmed or Suspected COVID-19    If you are awaiting your test result, you will either be called or it will be released to the patient portal.  If you have any questions about your test, please visit [email or web address] or call our COVID-19 information line at [phone].      Instructions for non-hospitalized or discharged patients with confirmed or suspected COVID-19:       Stay home except to get medical care.    Separate yourself from other people and animals in your home.    Call ahead before visiting your doctor.    Wear a face mask.    Cover your coughs and sneezes.    Clean your hands often.    Avoid sharing personal household items.    Clean all high-touch surfaces every day.    Monitor your symptoms. Seek prompt medical attention if your illness is worsening (e.g., difficulty breathing). Before seeking care, call your healthcare provider.    If you have a medical emergency and must call 911, notify the dispatcher that you have or are being evaluated for COVID-19. If possible, put on a face mask before emergency medical services arrive.    Use the following symptom-based strategy to return to normal activity following a suspected or confirmed case of COVID-19. Continue isolation until:   o At least 3 days (72 hours) have passed since recovery defined as resolution of fever without the use of fever-reducing medications and improvement in respiratory symptoms (e.g. cough, shortness of breath), and   o At least 10 days have passed since the first positive test.       As one of the next steps, you will receive a call or text from the [place] Department of Health ([facility]) COVID-19 Tracing Team. See the contact information below so you know not to ignore the health departments call. It is important that you contact them back immediately so they can help.     Contact Tracer  Number:  [phone]  Caller ID for most carriers: [facility]    What is contact tracing?   Contact tracing is a process that helps identify everyone who has been in close contact with an infected person. Contact tracers let those people know they may have been exposed and guide them on next steps. Confidentiality is important for everyone; no one will be told who may have exposed them to the virus.   Your involvement is important. The more we know about where and how this virus is spreading, the better chance we have at stopping it from spreading further.  What does exposure mean?   Exposure means you have been within 6 feet for more than 15 minutes with a person who has or had COVID-19.  What kind of questions do the contact tracers ask?   A contact tracer will confirm your basic contact information including name, address, phone number, and next of kin, as well as asking about any symptoms you may have had. Theyll also ask you how you think you may have gotten sick, such as places where you may have been exposed to the virus, and people you were with. Those names will never be shared with anyone outside of that call, and will only be used to help trace and stop the spread of the virus.   I have privacy concerns. How will the state use my information?   Your privacy about your health is important. All calls are completed using call centers that use the appropriate health privacy protection measures (HIPAA compliance), meaning that your patient information is safe. No one will ever ask you any questions related to immigration status. Your health comes first.   Do I have to participate?   You do not have to participate, but we strongly encourage you to. Contact tracing can help us catch and control new outbreaks as theyre developing to keep your friends and family safe.   What if I dont hear from anyone?   If you dont receive a call within 24 hours, you can call the number above right away to  inquire about your status. That line is open from 8:00 am - 8:00 p.m., 7 days a week.  Contact tracing saves lives! Together, we have the power to beat this virus and keep our loved ones and neighbors safe.       Instructions for household members, intimate partners and caregivers in a non-healthcare setting of a patient with confirmed or suspected COVID-19:         Close contacts should monitor their health and call their healthcare provider right away if they develop symptoms suggestive of COVID-19 (e.g., fever, cough, shortness of breath).    Stay home except to get medical care. Separate yourself from other people and animals in the home.   Monitor the patients symptoms. If the patient is getting sicker, call his or her healthcare provider. If the patient has a medical emergency and you need to call 911, notify the dispatch personnel that the patient has or is being evaluated for COVID-19.    Wear a facemask when around other people such as sharing a room or vehicle and before entering a healthcare provider's office.   Cover coughs and sneezes with a tissue. Throw used tissues in a lined trash can immediately and wash hands.   Clean hands often with soap and water for at least 20 seconds or with an alcohol-based hand , rubbing hands together until they feel dry. Avoid touching your eyes, nose, and mouth with unwashed hands.   Clean all high-touch; surfaces every day, including counters, tabletops, doorknobs, bathroom fixtures, toilets, phones, keyboards, tablets, bedside tables, etc. Use a household cleaning spray or wipe according to label instructions.   Avoid sharing personal household items such as dishes, drinking glasses, cups, towels, bedding, etc. After these items are used, they should be washed thoroughly with soap and water.   Continue isolation until:   At least 3 days (72 hours) have passed since recovery defined as resolution of fever without the use of fever-reducing  medications and improvement in respiratory symptoms (e.g. cough, shortness of breath), and    At least 10 days have passed since the patients first positive test.    https://www.cdc.gov/coronavirus/2019-ncov/your-health/index.htm

## 2020-11-24 NOTE — PROGRESS NOTES
Subjective:       Patient ID: Kami Higgins is a 69 y.o. female.    Chief Complaint: No chief complaint on file.  The patient location is: [place]  The chief complaint leading to consultation is: Sinus problem, cough, HA, SOB    Visit type: audio only    Face to Face time with patient: 20 min  minutes of total time spent on the encounter, which includes face to face time and non-face to face time preparing to see the patient (eg, review of tests), Obtaining and/or reviewing separately obtained history, Documenting clinical information in the electronic or other health record, Independently interpreting results (not separately reported) and communicating results to the patient/family/caregiver, or Care coordination (not separately reported).       Each patient to whom he or she provides medical services by telemedicine is:  (1) informed of the relationship between the physician and patient and the respective role of any other health care provider with respect to management of the patient; and (2) notified that he or she may decline to receive medical services by telemedicine and may withdraw from such care at any time.    Notes:     Sinus Problem  This is a new problem. The current episode started in the past 7 days. The problem is unchanged. There has been no fever. The pain is moderate. Associated symptoms include chills, congestion, coughing, ear pain, headaches, shortness of breath and sinus pressure. Pertinent negatives include no diaphoresis, hoarse voice, neck pain, sneezing, sore throat or swollen glands. (Pt states 02 saturation 73%-80% at night x 2 with 02 off; wears 02@2L; states 89% with 02 on) Past treatments include nothing. The treatment provided no relief.   Due to low 02 sats, pt advised to report to ER immediately; pt refused.   Past Medical History:   Diagnosis Date    AAA (abdominal aortic aneurysm) without rupture     COPD (chronic obstructive pulmonary disease)     Hyperlipidemia      Hypertension     Pulmonary nodule     Thyroid disease      Social History     Socioeconomic History    Marital status:      Spouse name: Not on file    Number of children: Not on file    Years of education: Not on file    Highest education level: Not on file   Occupational History    Not on file   Social Needs    Financial resource strain: Somewhat hard    Food insecurity     Worry: Sometimes true     Inability: Sometimes true    Transportation needs     Medical: No     Non-medical: No   Tobacco Use    Smoking status: Current Some Day Smoker     Packs/day: 0.50    Smokeless tobacco: Never Used   Substance and Sexual Activity    Alcohol use: Never     Frequency: Never    Drug use: Never    Sexual activity: Not Currently     Partners: Male   Lifestyle    Physical activity     Days per week: 3 days     Minutes per session: 40 min    Stress: To some extent   Relationships    Social connections     Talks on phone: More than three times a week     Gets together: Three times a week     Attends [employer] service: Never     Active member of club or organization: No     Attends meetings of clubs or organizations: Never     Relationship status: Not on file   Other Topics Concern    Not on file   Social History Narrative    Not on file     Past Surgical History:   Procedure Laterality Date    ABDOMINAL AORTIC ANEURYSM REPAIR      cardiac catheterization  2017    HYSTERECTOMY      THYROIDECTOMY      TUBAL LIGATION         Review of Systems   Constitutional: Positive for chills. Negative for diaphoresis.   HENT: Positive for nasal congestion, ear pain, postnasal drip, rhinorrhea and sinus pressure/congestion. Negative for hoarse voice, sneezing and sore throat.    Eyes: Negative.    Respiratory: Positive for cough and shortness of breath.    Cardiovascular: Negative.    Gastrointestinal: Negative.    Endocrine: Negative.    Genitourinary: Negative.    Musculoskeletal: Negative.  Negative for neck  pain.   Integumentary:  Negative.   Allergic/Immunologic: Negative.    Neurological: Positive for headaches.   Psychiatric/Behavioral: Negative.          Objective:      Physical Exam    Assessment:       1. Sinusitis, unspecified chronicity, unspecified location    2. Cough    3. Shortness of breath    4. Head ache    5. Otalgia, right    6. Low O2 saturation        Plan:           Diagnoses and all orders for this visit:    Sinusitis, unspecified chronicity, unspecified location  Cough  Shortness of breath  Head ache  Otalgia, right  Low O2 saturation  Comments:  States dropped to 73%-80% at night with O2 off; uses 02 @ 2L; states up to 89% with 02 on  -     COVID-19 Routine Screening; Future  -     Influenza A & B by Molecular; Future  -     benzonatate (TESSALON) 200 MG capsule; Take 1 capsule (200 mg total) by mouth 3 (three) times daily as needed.  -     azithromycin (Z-SHANA) 250 MG tablet; Take 2 tablets by mouth on day 1; Take 1 tablet by mouth on days 2-5        Albuterol as prescribed        COVID-19 home instructions given        ER recommended now; refused

## 2020-12-11 ENCOUNTER — PATIENT MESSAGE (OUTPATIENT)
Dept: OTHER | Facility: OTHER | Age: 70
End: 2020-12-11

## 2021-03-26 ENCOUNTER — PATIENT MESSAGE (OUTPATIENT)
Dept: FAMILY MEDICINE | Facility: CLINIC | Age: 71
End: 2021-03-26

## 2021-03-26 DIAGNOSIS — I10 HYPERTENSION, ESSENTIAL: ICD-10-CM

## 2021-03-26 DIAGNOSIS — Z79.899 ENCOUNTER FOR LONG-TERM (CURRENT) USE OF MEDICATIONS: ICD-10-CM

## 2021-03-26 RX ORDER — AMLODIPINE AND BENAZEPRIL HYDROCHLORIDE 10; 20 MG/1; MG/1
CAPSULE ORAL
Qty: 90 CAPSULE | Refills: 1 | Status: SHIPPED | OUTPATIENT
Start: 2021-03-26 | End: 2021-11-02 | Stop reason: SDUPTHER

## 2021-04-25 DIAGNOSIS — Z79.899 ENCOUNTER FOR LONG-TERM (CURRENT) USE OF MEDICATIONS: ICD-10-CM

## 2021-04-25 DIAGNOSIS — E03.9 HYPOTHYROIDISM, UNSPECIFIED TYPE: ICD-10-CM

## 2021-04-25 RX ORDER — LEVOTHYROXINE SODIUM 50 UG/1
TABLET ORAL
Qty: 90 TABLET | Refills: 0 | Status: SHIPPED | OUTPATIENT
Start: 2021-04-25 | End: 2021-11-02 | Stop reason: SDUPTHER

## 2021-04-26 ENCOUNTER — TELEPHONE (OUTPATIENT)
Dept: FAMILY MEDICINE | Facility: CLINIC | Age: 71
End: 2021-04-26

## 2021-04-26 DIAGNOSIS — Z13.220 ENCOUNTER FOR LIPID SCREENING FOR CARDIOVASCULAR DISEASE: ICD-10-CM

## 2021-04-26 DIAGNOSIS — Z13.6 ENCOUNTER FOR LIPID SCREENING FOR CARDIOVASCULAR DISEASE: ICD-10-CM

## 2021-04-26 DIAGNOSIS — E53.8 B12 DEFICIENCY: ICD-10-CM

## 2021-04-26 DIAGNOSIS — Z00.00 WELL ADULT EXAM: Primary | ICD-10-CM

## 2021-04-26 DIAGNOSIS — Z79.899 ENCOUNTER FOR LONG-TERM (CURRENT) USE OF MEDICATIONS: ICD-10-CM

## 2021-04-29 ENCOUNTER — PATIENT MESSAGE (OUTPATIENT)
Dept: RESEARCH | Facility: HOSPITAL | Age: 71
End: 2021-04-29

## 2021-05-29 DIAGNOSIS — J44.9 CHRONIC OBSTRUCTIVE PULMONARY DISEASE, UNSPECIFIED COPD TYPE: ICD-10-CM

## 2021-05-29 DIAGNOSIS — Z79.899 ENCOUNTER FOR LONG-TERM (CURRENT) USE OF MEDICATIONS: ICD-10-CM

## 2021-05-29 RX ORDER — ALBUTEROL SULFATE 90 UG/1
AEROSOL, METERED RESPIRATORY (INHALATION)
Qty: 18 G | Refills: 12 | Status: SHIPPED | OUTPATIENT
Start: 2021-05-29 | End: 2021-11-02 | Stop reason: SDUPTHER

## 2021-07-01 ENCOUNTER — PATIENT MESSAGE (OUTPATIENT)
Dept: ADMINISTRATIVE | Facility: OTHER | Age: 71
End: 2021-07-01

## 2021-07-16 ENCOUNTER — PATIENT OUTREACH (OUTPATIENT)
Dept: ADMINISTRATIVE | Facility: HOSPITAL | Age: 71
End: 2021-07-16

## 2021-11-02 ENCOUNTER — LAB VISIT (OUTPATIENT)
Dept: LAB | Facility: HOSPITAL | Age: 71
End: 2021-11-02
Attending: FAMILY MEDICINE
Payer: MEDICARE

## 2021-11-02 DIAGNOSIS — Z00.00 WELL ADULT EXAM: ICD-10-CM

## 2021-11-02 DIAGNOSIS — Z13.6 ENCOUNTER FOR LIPID SCREENING FOR CARDIOVASCULAR DISEASE: ICD-10-CM

## 2021-11-02 DIAGNOSIS — E03.9 HYPOTHYROIDISM, UNSPECIFIED TYPE: ICD-10-CM

## 2021-11-02 DIAGNOSIS — I10 HYPERTENSION, ESSENTIAL: ICD-10-CM

## 2021-11-02 DIAGNOSIS — Z79.899 ENCOUNTER FOR LONG-TERM (CURRENT) USE OF MEDICATIONS: ICD-10-CM

## 2021-11-02 DIAGNOSIS — J44.9 CHRONIC OBSTRUCTIVE PULMONARY DISEASE, UNSPECIFIED COPD TYPE: ICD-10-CM

## 2021-11-02 DIAGNOSIS — R93.7 ABNORMAL BONE DENSITY SCREENING: ICD-10-CM

## 2021-11-02 DIAGNOSIS — E78.5 HYPERLIPIDEMIA, UNSPECIFIED HYPERLIPIDEMIA TYPE: ICD-10-CM

## 2021-11-02 DIAGNOSIS — E53.8 B12 DEFICIENCY: ICD-10-CM

## 2021-11-02 DIAGNOSIS — E11.65 UNCONTROLLED TYPE 2 DIABETES MELLITUS WITH HYPERGLYCEMIA: ICD-10-CM

## 2021-11-02 DIAGNOSIS — Z13.220 ENCOUNTER FOR LIPID SCREENING FOR CARDIOVASCULAR DISEASE: ICD-10-CM

## 2021-11-02 LAB
ALBUMIN SERPL BCP-MCNC: 3.9 G/DL (ref 3.5–5.2)
ALP SERPL-CCNC: 88 U/L (ref 55–135)
ALT SERPL W/O P-5'-P-CCNC: 12 U/L (ref 10–44)
ANION GAP SERPL CALC-SCNC: 9 MMOL/L (ref 8–16)
AST SERPL-CCNC: 14 U/L (ref 10–40)
BILIRUB SERPL-MCNC: 0.4 MG/DL (ref 0.1–1)
BUN SERPL-MCNC: 15 MG/DL (ref 8–23)
CALCIUM SERPL-MCNC: 9.5 MG/DL (ref 8.7–10.5)
CHLORIDE SERPL-SCNC: 101 MMOL/L (ref 95–110)
CHOLEST SERPL-MCNC: 256 MG/DL (ref 120–199)
CHOLEST/HDLC SERPL: 5.8 {RATIO} (ref 2–5)
CO2 SERPL-SCNC: 27 MMOL/L (ref 23–29)
CREAT SERPL-MCNC: 0.8 MG/DL (ref 0.5–1.4)
EST. GFR  (AFRICAN AMERICAN): >60 ML/MIN/1.73 M^2
EST. GFR  (NON AFRICAN AMERICAN): >60 ML/MIN/1.73 M^2
ESTIMATED AVG GLUCOSE: 114 MG/DL (ref 68–131)
GLUCOSE SERPL-MCNC: 98 MG/DL (ref 70–110)
HBA1C MFR BLD: 5.6 % (ref 4–5.6)
HDLC SERPL-MCNC: 44 MG/DL (ref 40–75)
HDLC SERPL: 17.2 % (ref 20–50)
LDLC SERPL CALC-MCNC: 175.4 MG/DL (ref 63–159)
NONHDLC SERPL-MCNC: 212 MG/DL
POTASSIUM SERPL-SCNC: 4.9 MMOL/L (ref 3.5–5.1)
PROT SERPL-MCNC: 7.5 G/DL (ref 6–8.4)
SODIUM SERPL-SCNC: 137 MMOL/L (ref 136–145)
T4 FREE SERPL-MCNC: 0.77 NG/DL (ref 0.71–1.51)
TRIGL SERPL-MCNC: 183 MG/DL (ref 30–150)
TSH SERPL DL<=0.005 MIU/L-ACNC: 5.05 UIU/ML (ref 0.4–4)
VIT B12 SERPL-MCNC: 870 PG/ML (ref 210–950)

## 2021-11-02 PROCEDURE — 80061 LIPID PANEL: CPT | Performed by: FAMILY MEDICINE

## 2021-11-02 PROCEDURE — 80053 COMPREHEN METABOLIC PANEL: CPT | Performed by: FAMILY MEDICINE

## 2021-11-02 PROCEDURE — 85027 COMPLETE CBC AUTOMATED: CPT | Performed by: FAMILY MEDICINE

## 2021-11-02 PROCEDURE — 84443 ASSAY THYROID STIM HORMONE: CPT | Performed by: FAMILY MEDICINE

## 2021-11-02 PROCEDURE — 36415 COLL VENOUS BLD VENIPUNCTURE: CPT | Mod: PO | Performed by: FAMILY MEDICINE

## 2021-11-02 PROCEDURE — 83036 HEMOGLOBIN GLYCOSYLATED A1C: CPT | Performed by: FAMILY MEDICINE

## 2021-11-02 PROCEDURE — 84439 ASSAY OF FREE THYROXINE: CPT | Performed by: FAMILY MEDICINE

## 2021-11-02 PROCEDURE — 82607 VITAMIN B-12: CPT | Performed by: FAMILY MEDICINE

## 2021-11-02 RX ORDER — METFORMIN HYDROCHLORIDE 1000 MG/1
1000 TABLET ORAL 2 TIMES DAILY WITH MEALS
Qty: 180 TABLET | Refills: 4 | Status: SHIPPED | OUTPATIENT
Start: 2021-11-02 | End: 2022-11-02 | Stop reason: SDUPTHER

## 2021-11-02 RX ORDER — IPRATROPIUM BROMIDE AND ALBUTEROL SULFATE 2.5; .5 MG/3ML; MG/3ML
3 SOLUTION RESPIRATORY (INHALATION) EVERY 4 HOURS PRN
Qty: 1 EACH | Refills: 11 | Status: SHIPPED | OUTPATIENT
Start: 2021-11-02

## 2021-11-02 RX ORDER — VITAMIN B COMPLEX
1 CAPSULE ORAL DAILY
OUTPATIENT
Start: 2021-11-02

## 2021-11-02 RX ORDER — ALBUTEROL SULFATE 90 UG/1
AEROSOL, METERED RESPIRATORY (INHALATION)
Qty: 18 G | Refills: 12 | Status: SHIPPED | OUTPATIENT
Start: 2021-11-02 | End: 2023-04-30

## 2021-11-02 RX ORDER — LEVOTHYROXINE SODIUM 50 UG/1
TABLET ORAL
Qty: 90 TABLET | Refills: 4 | Status: SHIPPED | OUTPATIENT
Start: 2021-11-02 | End: 2022-11-21

## 2021-11-02 RX ORDER — ATORVASTATIN CALCIUM 40 MG/1
40 TABLET, FILM COATED ORAL DAILY
Qty: 90 TABLET | Refills: 2 | Status: SHIPPED | OUTPATIENT
Start: 2021-11-02 | End: 2022-07-29 | Stop reason: SDUPTHER

## 2021-11-02 RX ORDER — CALCIUM CARBONATE 500(1250)
1 TABLET ORAL DAILY
Qty: 360 TABLET | Refills: 0 | Status: CANCELLED | OUTPATIENT
Start: 2021-11-02 | End: 2022-11-02

## 2021-11-02 RX ORDER — CETIRIZINE HYDROCHLORIDE 10 MG/1
10 TABLET ORAL DAILY
Qty: 30 TABLET | Refills: 11 | OUTPATIENT
Start: 2021-11-02 | End: 2021-11-12

## 2021-11-02 RX ORDER — AMLODIPINE AND BENAZEPRIL HYDROCHLORIDE 10; 20 MG/1; MG/1
1 CAPSULE ORAL DAILY
Qty: 90 CAPSULE | Refills: 4 | Status: SHIPPED | OUTPATIENT
Start: 2021-11-02 | End: 2023-01-11

## 2021-11-03 DIAGNOSIS — E03.9 HYPOTHYROIDISM, UNSPECIFIED TYPE: ICD-10-CM

## 2021-11-03 DIAGNOSIS — D75.1 POLYCYTHEMIA: Primary | ICD-10-CM

## 2021-11-03 LAB
ERYTHROCYTE [DISTWIDTH] IN BLOOD BY AUTOMATED COUNT: 14 % (ref 11.5–14.5)
HCT VFR BLD AUTO: 53.1 % (ref 37–48.5)
HGB BLD-MCNC: 17 G/DL (ref 12–16)
MCH RBC QN AUTO: 32.8 PG (ref 27–31)
MCHC RBC AUTO-ENTMCNC: 32 G/DL (ref 32–36)
MCV RBC AUTO: 102 FL (ref 82–98)
PLATELET # BLD AUTO: 213 K/UL (ref 150–450)
PMV BLD AUTO: 10.6 FL (ref 9.2–12.9)
RBC # BLD AUTO: 5.19 M/UL (ref 4–5.4)
WBC # BLD AUTO: 8.16 K/UL (ref 3.9–12.7)

## 2021-11-05 ENCOUNTER — TELEPHONE (OUTPATIENT)
Dept: FAMILY MEDICINE | Facility: CLINIC | Age: 71
End: 2021-11-05
Payer: MEDICARE

## 2021-11-29 ENCOUNTER — OFFICE VISIT (OUTPATIENT)
Dept: FAMILY MEDICINE | Facility: CLINIC | Age: 71
End: 2021-11-29
Payer: MEDICARE

## 2021-11-29 VITALS
WEIGHT: 178.88 LBS | HEIGHT: 61 IN | TEMPERATURE: 98 F | BODY MASS INDEX: 33.77 KG/M2 | SYSTOLIC BLOOD PRESSURE: 126 MMHG | HEART RATE: 88 BPM | DIASTOLIC BLOOD PRESSURE: 74 MMHG

## 2021-11-29 DIAGNOSIS — H66.90 OTITIS MEDIA, UNSPECIFIED LATERALITY, UNSPECIFIED OTITIS MEDIA TYPE: Primary | ICD-10-CM

## 2021-11-29 DIAGNOSIS — H65.93 FLUID LEVEL BEHIND TYMPANIC MEMBRANE OF BOTH EARS: ICD-10-CM

## 2021-11-29 DIAGNOSIS — H92.09 OTALGIA, UNSPECIFIED LATERALITY: ICD-10-CM

## 2021-11-29 PROCEDURE — 99999 PR PBB SHADOW E&M-EST. PATIENT-LVL IV: CPT | Mod: PBBFAC,,, | Performed by: NURSE PRACTITIONER

## 2021-11-29 PROCEDURE — 99214 OFFICE O/P EST MOD 30 MIN: CPT | Mod: PBBFAC,PO | Performed by: NURSE PRACTITIONER

## 2021-11-29 PROCEDURE — 99213 PR OFFICE/OUTPT VISIT, EST, LEVL III, 20-29 MIN: ICD-10-PCS | Mod: S$PBB,,, | Performed by: NURSE PRACTITIONER

## 2021-11-29 PROCEDURE — 99213 OFFICE O/P EST LOW 20 MIN: CPT | Mod: S$PBB,,, | Performed by: NURSE PRACTITIONER

## 2021-11-29 PROCEDURE — 99999 PR PBB SHADOW E&M-EST. PATIENT-LVL IV: ICD-10-PCS | Mod: PBBFAC,,, | Performed by: NURSE PRACTITIONER

## 2021-11-29 RX ORDER — OFLOXACIN 3 MG/ML
SOLUTION/ DROPS OPHTHALMIC
Qty: 1 EACH | Refills: 0 | Status: SHIPPED | OUTPATIENT
Start: 2021-11-29

## 2021-11-29 RX ORDER — AZITHROMYCIN 250 MG/1
TABLET, FILM COATED ORAL
Qty: 6 TABLET | Refills: 0 | Status: SHIPPED | OUTPATIENT
Start: 2021-11-29 | End: 2021-12-04

## 2021-12-07 PROBLEM — D75.1 SECONDARY POLYCYTHEMIA: Status: ACTIVE | Noted: 2021-12-07

## 2022-03-01 ENCOUNTER — TELEPHONE (OUTPATIENT)
Dept: FAMILY MEDICINE | Facility: CLINIC | Age: 72
End: 2022-03-01
Payer: MEDICARE

## 2022-03-01 NOTE — TELEPHONE ENCOUNTER
Attempted to contact patient to inform of appointment made for tomorrow with Dr. Steinberg. Left message for patient to return call.

## 2022-03-01 NOTE — TELEPHONE ENCOUNTER
----- Message from Amy Dumont sent at 3/1/2022  3:26 PM CST -----  States she would like to see Dr Steinberg tomorrow. States she thinks she has blood clots. She didn't want to see anyone else. Please call pt [phone]. Thank you

## 2022-03-02 ENCOUNTER — TELEPHONE (OUTPATIENT)
Dept: FAMILY MEDICINE | Facility: CLINIC | Age: 72
End: 2022-03-02
Payer: MEDICARE

## 2022-03-02 NOTE — TELEPHONE ENCOUNTER
----- Message from Veronica Perezerson sent at 3/2/2022 10:33 AM CST -----  Contact: Pt [email or web address]@[phone]--  PT states that she on her way now to the appointment and would like to see if she will still be seen if 10-15 min's late for 10:20 am. I informed her that after 20 min's we need to reschedule but pt states that they doctor wants to see her today. I tried to call the office but no one picked up. Please call to advise.

## 2022-03-02 NOTE — TELEPHONE ENCOUNTER
Attempted to reach patient several times to r/s appointment with available provider.  I got her voicemail and left a message.

## 2022-03-02 NOTE — TELEPHONE ENCOUNTER
----- Message from Chuyita Campbell sent at 3/2/2022 10:55 AM CST -----  Type:  Sooner Apoointment Request    Caller is requesting a sooner appointment.  Caller declined first available appointment listed below.  Caller will not accept being placed on the waitlist and is requesting a message be sent to doctor.    Name of Caller:  Patient  When is the first available appointment?  5/23   [phone] (home)

## 2022-03-04 ENCOUNTER — TELEPHONE (OUTPATIENT)
Dept: FAMILY MEDICINE | Facility: CLINIC | Age: 72
End: 2022-03-04
Payer: MEDICARE

## 2022-03-04 NOTE — TELEPHONE ENCOUNTER
----- Message from Trina Molina sent at 3/4/2022 10:56 AM CST -----  Type:  Same Day Appointment Request    Caller is requesting a same day appointment.  Caller declined first available appointment listed below.    Name of Caller:patient  When is the first available appointment?3/7  Symptoms:breathing issues,low grade fever, ear pain,coughing,congestion  Best Call Back Number:[phone]  Additional Information:na

## 2022-03-04 NOTE — TELEPHONE ENCOUNTER
Attempted to call patient several times, no answer and her voicemail is full so I could not leave a message.

## 2022-03-07 DIAGNOSIS — J44.9 CHRONIC OBSTRUCTIVE PULMONARY DISEASE, UNSPECIFIED COPD TYPE: ICD-10-CM

## 2022-03-07 DIAGNOSIS — Z79.899 ENCOUNTER FOR LONG-TERM (CURRENT) USE OF MEDICATIONS: ICD-10-CM

## 2022-03-07 RX ORDER — UMECLIDINIUM BROMIDE AND VILANTEROL TRIFENATATE 62.5; 25 UG/1; UG/1
POWDER RESPIRATORY (INHALATION)
Qty: 60 EACH | Refills: 11 | Status: SHIPPED | OUTPATIENT
Start: 2022-03-07 | End: 2022-11-14 | Stop reason: SDUPTHER

## 2022-03-07 NOTE — TELEPHONE ENCOUNTER
----- Message from Shonda Arvizu sent at 3/7/2022  2:19 PM CST -----  Regarding: refill  Contact: pt  1. What is the name of the medication you are requesting? Anoro Inhaler   2. What is the dose? n/a  3. How do you take the medication? Orally, topically, etc? n/a  4. How often do you take this medication? n/a  5. Do you need a 30 day or 90 day supply? n/a  6. How many refills are you requesting? n/a  7. What is your preferred pharmacy and location of the pharmacy? [facility] in [place]  8. Who can we contact with further questions? The pt at [phone]

## 2022-03-07 NOTE — TELEPHONE ENCOUNTER
Care Due:                  Date            Visit Type   Department     Provider  --------------------------------------------------------------------------------    Last Visit: None Found      None         None Found  Next Visit: None Scheduled  None         None Found                                                            Last  Test          Frequency    Reason                     Performed    Due Date  --------------------------------------------------------------------------------    Office Visit  12 months..  ANORO,                     Not Found    Overdue                             amlodipine-benazepril,                             atorvastatin, metFORMIN..    HBA1C.......  6 months...  metFORMIN................  11- 05-    Powered by [employer]. Reference number: [id number].   3/07/2022 2:37:01 PM CST

## 2022-04-26 ENCOUNTER — OFFICE VISIT (OUTPATIENT)
Dept: FAMILY MEDICINE | Facility: CLINIC | Age: 72
End: 2022-04-26
Payer: MEDICARE

## 2022-04-26 ENCOUNTER — TELEPHONE (OUTPATIENT)
Dept: FAMILY MEDICINE | Facility: CLINIC | Age: 72
End: 2022-04-26
Payer: MEDICARE

## 2022-04-26 VITALS
WEIGHT: 172.88 LBS | OXYGEN SATURATION: 95 % | HEART RATE: 87 BPM | BODY MASS INDEX: 32.64 KG/M2 | HEIGHT: 61 IN | DIASTOLIC BLOOD PRESSURE: 70 MMHG | SYSTOLIC BLOOD PRESSURE: 128 MMHG

## 2022-04-26 DIAGNOSIS — I10 HYPERTENSION, ESSENTIAL: ICD-10-CM

## 2022-04-26 DIAGNOSIS — E78.5 HYPERLIPIDEMIA, UNSPECIFIED HYPERLIPIDEMIA TYPE: ICD-10-CM

## 2022-04-26 DIAGNOSIS — E11.65 UNCONTROLLED TYPE 2 DIABETES MELLITUS WITH HYPERGLYCEMIA: ICD-10-CM

## 2022-04-26 DIAGNOSIS — R60.0 BILATERAL LOWER EXTREMITY EDEMA: Primary | ICD-10-CM

## 2022-04-26 PROCEDURE — 99214 OFFICE O/P EST MOD 30 MIN: CPT | Mod: S$PBB,,, | Performed by: NURSE PRACTITIONER

## 2022-04-26 PROCEDURE — 99214 OFFICE O/P EST MOD 30 MIN: CPT | Mod: PBBFAC,PO | Performed by: NURSE PRACTITIONER

## 2022-04-26 PROCEDURE — 99999 PR PBB SHADOW E&M-EST. PATIENT-LVL IV: ICD-10-PCS | Mod: PBBFAC,,, | Performed by: NURSE PRACTITIONER

## 2022-04-26 PROCEDURE — 99214 PR OFFICE/OUTPT VISIT, EST, LEVL IV, 30-39 MIN: ICD-10-PCS | Mod: S$PBB,,, | Performed by: NURSE PRACTITIONER

## 2022-04-26 PROCEDURE — 99999 PR PBB SHADOW E&M-EST. PATIENT-LVL IV: CPT | Mod: PBBFAC,,, | Performed by: NURSE PRACTITIONER

## 2022-04-26 RX ORDER — DOXYCYCLINE 100 MG/1
100 CAPSULE ORAL 2 TIMES DAILY
COMMUNITY
Start: 2022-03-04 | End: 2022-04-26 | Stop reason: ALTCHOICE

## 2022-04-26 RX ORDER — PREDNISONE 20 MG/1
TABLET ORAL
COMMUNITY
Start: 2022-03-04 | End: 2022-04-26 | Stop reason: ALTCHOICE

## 2022-04-26 NOTE — PROGRESS NOTES
Assessment/Plan:  Problem List Items Addressed This Visit        Cardiac/Vascular    Hyperlipidemia    Overview     Chronic.  Control is uncertain.  Requesting records.  Checking fasting lipid panel.  Continue Lipitor.  No side effects reported.  Reports compliance.           Current Assessment & Plan     -chronic condition. Currently stable.    -reports compliance with hyperlipidemia treatment as prescribed  -denies any known adverse effects of medications  -recent labs listed below:  Lab Results   Component Value Date    CHOL 256 (H) 11/02/2021     Lab Results   Component Value Date    HDL 44 11/02/2021     Lab Results   Component Value Date    LDLCALC 175.4 (H) 11/02/2021     Lab Results   Component Value Date    TRIG 183 (H) 11/02/2021     Lab Results   Component Value Date    ALT 12 11/02/2021    AST 14 11/02/2021    ALKPHOS 88 11/02/2021    BILITOT 0.4 11/02/2021     Hyperlipidemia Medications             atorvastatin (LIPITOR) 40 MG tablet Take 1 tablet (40 mg total) by mouth once daily.                 Hypertension, essential    Overview     Initial HPI:  Chronic.  Stable.  Well controlled today.  On amlodipine benazepril combination.  Reports compliance.  No side effects reported.  Denies any chest pain shortness of breath blurred vision.    September 20th 2019  Blood pressure stable today.  Patient taking amlodipine benazepril combo.  Reports compliance.  No side effects reported.           Current Assessment & Plan     -at goal today  -continue lifestyle modification with low sodium diet and exercise   -discussed hypertension disease course and importance of treating high blood pressure  -patient understood and advised of risk of untreated blood pressure.  ER precautions were given for symptoms of hypertensive urgency and emergency.    Hypertension Medications             amlodipine-benazepril 10-20mg (LOTREL) 10-20 mg per capsule Take 1 capsule by mouth once daily.                    Endocrine     Uncontrolled type 2 diabetes mellitus with hyperglycemia    Overview     07/30/2019   Chronic. Stable.     No results found for: LABA1C, HGBA1C     Patient taking metformin    Med Compliance:  Good  [facility]?  Dr. Royce Reilly in [place]  Ft exam?  Up-to-date  Pneumonia Vaccine?  Unsure           Current Assessment & Plan     -condition is currently controlled   -see diabetic health maintenance listed below  -on statin: No  -on ACE-I/ARB: Yes  -counseling provided on importance of diabetic diet and medication compliance in order to treat diabetes  -discussed diabetes disease course and potential complications    Diabetes Medications             metFORMIN (GLUCOPHAGE) 1000 MG tablet Take 1 tablet (1,000 mg total) by mouth 2 (two) times daily with meals.          Lab Results   Component Value Date    HGBA1C 5.6 11/02/2021                 Other    Bilateral lower extremity edema - Primary    Overview     Patient is a 71-year-old female who presents in clinic today as an established patient here for swelling to BLE. She reports that she has been on her feet more than usual this week. She has had no recent surgery. No recent travel. There is no pain to BLE. Denies redness, warmth, or tenderness. No prior history of DVT. There is no history of CKD or CHF. She has tried nothing for the symptoms. She has not had visit with PCP since 2019.     Mild, trace, non-pitting edema on exam. Recommend compression socks, low-salt diet, and elevation of legs.                Recommend update routine labs and annual health maintenance screenings. Patient should follow up in 1 month for annual wellness or sooner for additional concerns. Verbalized understanding.     Follow up in about 1 month (around 5/26/2022), or if symptoms worsen or fail to improve, for Follow up with PCP.    Keiko Broderick  NP  _____________________________________________________________________________________________________________________________________________________    CC: swelling    Edema: Patient is a 71-year-old female who presents in clinic today as an established patient here for swelling to BLE. She reports that she has been on her feet more than usual this week. She has had no recent surgery. No recent travel. There is no pain to BLE. Denies redness, warmth, or tenderness. No prior history of DVT. There is no history of CKD or CHF. She has tried nothing for the symptoms. She has not had visit with PCP since 2019.     HTN: The patient is currently being treated for essential hypertension. This condition is chronic and stable. The patient is tolerating their medication well with good compliance.  Denies any adverse effects of medications.  Counseling was offered regarding low sodium diet.  The patient has a reduced salt intake. Routine exercise recommended. The patient denies headache, vision changes, chest pain, palpitations, shortness of breath, or lower extremity edema.    Hyperlipidemia: This is a chronic problem. The current episode started more than 1 year ago. The problem is controlled. Recent lipid tests were reviewed and are variable. Pertinent negatives include no chest pain or shortness of breath. Current antihyperlipidemic treatment includes statins. The current treatment provides moderate improvement of lipids. There are no compliance problems.      DM2: Patient presents for follow up of diabetes. Condition is chronic and stable. Patient denies symptoms, including foot ulcerations, hyperglycemia, hypoglycemia , nausea, paresthesia of the feet, polydipsia, polyuria and visual disturbances.  Evaluation to date has been included: fasting blood sugar, fasting lipid panel, hemoglobin A1C and microalbuminuria. Treatment to date: Metformin. Denies adverse effects of medications.     Statin: Taking  ACE/ARB:  Taking    Screening or Prevention Patient's value Goal Complete/Controlled?   HgA1C Testing and Control   Lab Results   Component Value Date    HGBA1C 5.6 11/02/2021      Annually/Less than 8% Yes   Lipid profile : 11/02/2021 Annually Yes   LDL control Lab Results   Component Value Date    LDLCALC 175.4 (H) 11/02/2021    Annually/Less than 100 mg/dl  No   Nephropathy screening Lab Results   Component Value Date    LABMICR 16.0 07/30/2019     Lab Results   Component Value Date    PROTEINUA Negative 08/20/2019    Annually No   Blood pressure BP Readings from Last 1 Encounters:   04/26/22 128/70    Less than 140/90 Yes   Dilated retinal exam Most Recent Eye Exam Date: Not Found Annually Yes   Foot exam   Most Recent Foot Exam Date: Not Found Annually Yes     Past Medical History:  Past Medical History:   Diagnosis Date    AAA (abdominal aortic aneurysm) without rupture     COPD (chronic obstructive pulmonary disease)     Hyperlipidemia     Hypertension     Pulmonary nodule     Thyroid disease      Past Surgical History:   Procedure Laterality Date    ABDOMINAL AORTIC ANEURYSM REPAIR      cardiac catheterization  2017    HYSTERECTOMY      THYROIDECTOMY      TUBAL LIGATION       Review of patient's allergies indicates:   Allergen Reactions    Aspirin, buffered     Codeine     Penicillins Hives    Singulair [montelukast]      Social History     Tobacco Use    Smoking status: Current Some Day Smoker     Packs/day: 0.50    Smokeless tobacco: Never Used   Substance Use Topics    Alcohol use: Never    Drug use: Never     Family History   Problem Relation Age of Onset    Colon cancer Neg Hx     Stomach cancer Neg Hx     Esophageal cancer Neg Hx      Current Outpatient Medications on File Prior to Visit   Medication Sig Dispense Refill    albuterol (VENTOLIN HFA) 90 mcg/actuation inhaler INHALE ONE OR TWO PUFFS BY MOUTH EVERY FOUR TO SIX HOURS AS NEEDED FOR WHEEZING 18 g 12    albuterol-ipratropium  (DUO-NEB) 2.5 mg-0.5 mg/3 mL nebulizer solution Take 3 mLs by nebulization every 4 (four) hours as needed for Wheezing or Shortness of Breath. 1 each 11    amlodipine-benazepril 10-20mg (LOTREL) 10-20 mg per capsule Take 1 capsule by mouth once daily. 90 capsule 4    ANORO ELLIPTA 62.5-25 mcg/actuation DsDv INHALE ONE PUFF BY MOUTH DAILY 60 each 11    atorvastatin (LIPITOR) 40 MG tablet Take 1 tablet (40 mg total) by mouth once daily. 90 tablet 2    b complex vitamins capsule Take 1 capsule by mouth once daily.      levothyroxine (SYNTHROID) 50 MCG tablet TAKE ONE TABLET BY MOUTH EVERY MORNING WITH BREAKFAST. 90 tablet 4    metFORMIN (GLUCOPHAGE) 1000 MG tablet Take 1 tablet (1,000 mg total) by mouth 2 (two) times daily with meals. 180 tablet 4    multivitamin capsule Take 1 capsule by mouth once daily.      ofloxacin (OCUFLOX) 0.3 % ophthalmic solution Apply 5 drops into each ear twice daily for 7 days 1 each 0    calcium carbonate (OS-FREDY) 500 mg calcium (1,250 mg) tablet Take 1 tablet (500 mg total) by mouth once daily. 360 tablet 0    calcium-vitamin D3 (OS-FREDY 500 + D3) 500 mg(1,250mg) -200 unit per tablet TAKE ONE TABLET BY MOUTH DAILY  360 tablet 11    cetirizine (ZYRTEC) 10 MG tablet Take 1 tablet (10 mg total) by mouth once daily. for 10 days 30 tablet 11     No current facility-administered medications on file prior to visit.     Review of Systems   Constitutional: Negative for appetite change, chills, fatigue and fever.   HENT: Negative for congestion, rhinorrhea and sore throat.    Eyes: Negative for visual disturbance.   Respiratory: Positive for shortness of breath (chronic).    Cardiovascular: Positive for leg swelling. Negative for chest pain and palpitations.   Gastrointestinal: Negative for abdominal pain, diarrhea and vomiting.   Genitourinary: Negative for difficulty urinating, dysuria and hematuria.   Musculoskeletal: Negative for arthralgias and myalgias.   Skin: Negative for rash  "and wound.   Neurological: Negative for dizziness and headaches.   Psychiatric/Behavioral: Negative for behavioral problems. The patient is not nervous/anxious.      Vitals:    04/26/22 1334   BP: 128/70   BP Location: Left arm   Pulse: 87   SpO2: 95%   Weight: 78.4 kg (172 lb 14.4 oz)   Height: 5' 1" (1.549 m)     Wt Readings from Last 3 Encounters:   04/26/22 78.4 kg (172 lb 14.4 oz)   11/29/21 81.1 kg (178 lb 14.4 oz)   10/04/19 81.6 kg (180 lb)     Physical Exam  Vitals reviewed.   Constitutional:       General: She is not in acute distress.     Appearance: Normal appearance. She is not ill-appearing.   HENT:      Head: Normocephalic and atraumatic.      Right Ear: External ear normal.      Left Ear: External ear normal.   Eyes:      Extraocular Movements: Extraocular movements intact.      Conjunctiva/sclera: Conjunctivae normal.   Cardiovascular:      Rate and Rhythm: Normal rate.      Heart sounds: Normal heart sounds.   Pulmonary:      Effort: Pulmonary effort is normal. No respiratory distress.      Comments: On 2L NC  Musculoskeletal:         General: Normal range of motion.      Cervical back: Normal range of motion.      Right lower leg: Edema (trace, nonpitting) present.      Left lower leg: Edema (trace, nonpitting) present.   Skin:     General: Skin is warm and dry.      Coloration: Skin is not pale.      Findings: No rash.   Neurological:      Mental Status: She is alert and oriented to person, place, and time. Mental status is at baseline.   Psychiatric:         Mood and Affect: Mood normal.         Speech: Speech normal.         Behavior: Behavior normal. Behavior is cooperative.       Health Maintenance   Topic Date Due    TETANUS VACCINE  Never done    Mammogram  07/30/2020    DEXA Scan  07/30/2022    Lipid Panel  11/02/2026    Hepatitis C Screening  Completed       "

## 2022-04-26 NOTE — TELEPHONE ENCOUNTER
----- Message from Joseline Hansen sent at 4/26/2022  9:51 AM CDT -----  Contact: SHAWNA FERNANDES [[id number]]  .Type:  Same Day Appointment Request    Caller is requesting a same day appointment.  Caller declined first available appointment listed below.    Name of Caller: SHAWNA FERNANDES [[id number]]  When is the first available appointment?  05/19/2022   Symptoms: pt feet and legs are swollen    Best Call Back Number: [phone] (home)    Additional Information:

## 2022-04-27 ENCOUNTER — TELEPHONE (OUTPATIENT)
Dept: FAMILY MEDICINE | Facility: CLINIC | Age: 72
End: 2022-04-27
Payer: MEDICARE

## 2022-04-27 ENCOUNTER — OFFICE VISIT (OUTPATIENT)
Dept: FAMILY MEDICINE | Facility: CLINIC | Age: 72
End: 2022-04-27
Payer: MEDICARE

## 2022-04-27 DIAGNOSIS — N39.0 URINARY TRACT INFECTION WITHOUT HEMATURIA, SITE UNSPECIFIED: Primary | ICD-10-CM

## 2022-04-27 DIAGNOSIS — R30.0 DYSURIA: Primary | ICD-10-CM

## 2022-04-27 PROBLEM — H92.02 LEFT EAR PAIN: Status: RESOLVED | Noted: 2019-08-20 | Resolved: 2022-04-27

## 2022-04-27 PROBLEM — H66.003 ACUTE SUPPURATIVE OTITIS MEDIA OF BOTH EARS WITHOUT SPONTANEOUS RUPTURE OF TYMPANIC MEMBRANES: Status: RESOLVED | Noted: 2019-07-30 | Resolved: 2022-04-27

## 2022-04-27 PROBLEM — J02.9 ACUTE PHARYNGITIS: Status: RESOLVED | Noted: 2018-02-02 | Resolved: 2022-04-27

## 2022-04-27 PROBLEM — Z12.31 ENCOUNTER FOR SCREENING MAMMOGRAM FOR BREAST CANCER: Status: RESOLVED | Noted: 2019-07-30 | Resolved: 2022-04-27

## 2022-04-27 PROBLEM — R60.0 BILATERAL LOWER EXTREMITY EDEMA: Status: ACTIVE | Noted: 2022-04-27

## 2022-04-27 PROCEDURE — 99442 PR PHYSICIAN TELEPHONE EVALUATION 11-20 MIN: CPT | Mod: 95,,, | Performed by: FAMILY MEDICINE

## 2022-04-27 PROCEDURE — 99442 PR PHYSICIAN TELEPHONE EVALUATION 11-20 MIN: ICD-10-PCS | Mod: 95,,, | Performed by: FAMILY MEDICINE

## 2022-04-27 RX ORDER — PHENAZOPYRIDINE HYDROCHLORIDE 200 MG/1
200 TABLET, FILM COATED ORAL 3 TIMES DAILY PRN
Qty: 9 TABLET | Refills: 0 | Status: SHIPPED | OUTPATIENT
Start: 2022-04-27 | End: 2022-04-30

## 2022-04-27 RX ORDER — SULFAMETHOXAZOLE AND TRIMETHOPRIM 800; 160 MG/1; MG/1
1 TABLET ORAL 2 TIMES DAILY
Qty: 14 TABLET | Refills: 0 | Status: SHIPPED | OUTPATIENT
Start: 2022-04-27 | End: 2022-05-04

## 2022-04-27 NOTE — ASSESSMENT & PLAN NOTE
-condition is currently controlled   -see diabetic health maintenance listed below  -on statin: No  -on ACE-I/ARB: Yes  -counseling provided on importance of diabetic diet and medication compliance in order to treat diabetes  -discussed diabetes disease course and potential complications    Diabetes Medications             metFORMIN (GLUCOPHAGE) 1000 MG tablet Take 1 tablet (1,000 mg total) by mouth 2 (two) times daily with meals.          Lab Results   Component Value Date    HGBA1C 5.6 11/02/2021

## 2022-04-27 NOTE — TELEPHONE ENCOUNTER
Pt saw you yesterday, she is asking for a UA , she states it feels she has a UTI, it burns when she uses the restroom

## 2022-04-27 NOTE — TELEPHONE ENCOUNTER
----- Message from Kulwinder Tello sent at 4/27/2022  2:59 PM CDT -----  Contact: self  Pt would like to consult with nurse regarding UTI.  Please contact Kami Higgins @ [phone].  Thanks/As

## 2022-04-27 NOTE — ASSESSMENT & PLAN NOTE
-at goal today  -continue lifestyle modification with low sodium diet and exercise   -discussed hypertension disease course and importance of treating high blood pressure  -patient understood and advised of risk of untreated blood pressure.  ER precautions were given for symptoms of hypertensive urgency and emergency.    Hypertension Medications             amlodipine-benazepril 10-20mg (LOTREL) 10-20 mg per capsule Take 1 capsule by mouth once daily.

## 2022-04-27 NOTE — TELEPHONE ENCOUNTER
Patient complaining of bilateral side pain and dysuria.  Also says her left ear is stopped up. I have scheduled audio visit with Dr. Sandoval, but patient was just seen on yesterday by Keiko Broderick, but for different reason.  Please advise.

## 2022-04-27 NOTE — ADDENDUM NOTE
Addended by: MADHURI GREGORIO on: 4/27/2022 03:30 PM     Modules accepted: Orders    
complains of pain/discomfort

## 2022-04-27 NOTE — PROGRESS NOTES
The patient location is: Home  The chief complaint leading to consultation is:Dysuria ear pain  Visit type: Virtual visit with synchronous audio   Total time spent with patient: 15 minutes  Each patient to whom he or she provides medical services by telemedicine is:  (1) informed of the relationship between the physician and patient and the respective role of any other health care provider with respect to management of the patient; and (2) notified that he or she may decline to receive medical services by telemedicine and may withdraw from such care at any time.    Notes:   Kami Higgins presents with moderate dyuria and freqency and ear pain cw previous otitis past 2-3 days. No dyspnea Denies nausea,vomiting,diarrhea or significant fever.    Past Medical History:   Diagnosis Date    AAA (abdominal aortic aneurysm) without rupture     COPD (chronic obstructive pulmonary disease)     Hyperlipidemia     Hypertension     Pulmonary nodule     Thyroid disease      Past Surgical History:   Procedure Laterality Date    ABDOMINAL AORTIC ANEURYSM REPAIR      cardiac catheterization  2017    HYSTERECTOMY      THYROIDECTOMY      TUBAL LIGATION       Review of patient's allergies indicates:   Allergen Reactions    Aspirin, buffered     Codeine     Penicillins Hives    Singulair [montelukast]      Current Outpatient Medications on File Prior to Visit   Medication Sig Dispense Refill    albuterol (VENTOLIN HFA) 90 mcg/actuation inhaler INHALE ONE OR TWO PUFFS BY MOUTH EVERY FOUR TO SIX HOURS AS NEEDED FOR WHEEZING 18 g 12    albuterol-ipratropium (DUO-NEB) 2.5 mg-0.5 mg/3 mL nebulizer solution Take 3 mLs by nebulization every 4 (four) hours as needed for Wheezing or Shortness of Breath. 1 each 11    amlodipine-benazepril 10-20mg (LOTREL) 10-20 mg per capsule Take 1 capsule by mouth once daily. 90 capsule 4    ANORO ELLIPTA 62.5-25 mcg/actuation DsDv INHALE ONE PUFF BY MOUTH DAILY 60 each 11    atorvastatin  (LIPITOR) 40 MG tablet Take 1 tablet (40 mg total) by mouth once daily. 90 tablet 2    b complex vitamins capsule Take 1 capsule by mouth once daily.      calcium carbonate (OS-FREDY) 500 mg calcium (1,250 mg) tablet Take 1 tablet (500 mg total) by mouth once daily. 360 tablet 0    calcium-vitamin D3 (OS-FREDY 500 + D3) 500 mg(1,250mg) -200 unit per tablet TAKE ONE TABLET BY MOUTH DAILY  360 tablet 11    cetirizine (ZYRTEC) 10 MG tablet Take 1 tablet (10 mg total) by mouth once daily. for 10 days 30 tablet 11    levothyroxine (SYNTHROID) 50 MCG tablet TAKE ONE TABLET BY MOUTH EVERY MORNING WITH BREAKFAST. 90 tablet 4    metFORMIN (GLUCOPHAGE) 1000 MG tablet Take 1 tablet (1,000 mg total) by mouth 2 (two) times daily with meals. 180 tablet 4    multivitamin capsule Take 1 capsule by mouth once daily.      ofloxacin (OCUFLOX) 0.3 % ophthalmic solution Apply 5 drops into each ear twice daily for 7 days 1 each 0     No current facility-administered medications on file prior to visit.     Social History     Socioeconomic History    Marital status:    Tobacco Use    Smoking status: Current Some Day Smoker     Packs/day: 0.50    Smokeless tobacco: Never Used   Substance and Sexual Activity    Alcohol use: Never    Drug use: Never    Sexual activity: Not Currently     Partners: Male     Family History   Problem Relation Age of Onset    Colon cancer Neg Hx     Stomach cancer Neg Hx     Esophageal cancer Neg Hx          ROS:  SKIN: No rashes, itching or changes in color or texture of skin.  EYES: Visual acuity fine. No photophobia, ocular pain or diplopia.EARS: Denies ear pain, discharge or vertigo.NOSE: No loss of smell, no epistaxis some postnasal drip.MOUTH & THROAT: No hoarseness or change in voice. No excessive gum bleeding.CHEST: Denies PATEL, cyanosis, wheezing  CARDIOVASCULAR: Denies chest pain, PND, orthopnea or reduced exercise tolerance.  ABDOMEN:  No weight loss.No abdominal pain, no hematemesis  or blood in stool.  URINARY: Rt flank pain, dysuria no hematuria.  PERIPHERAL VASCULAR: No claudication or cyanosis.  MUSCULOSKELETAL: Negative   NEUROLOGIC: No history of seizures, paralysis, alteration of gait or coordination.    PE:    Chest:No tachypnea. No wheezing, rhonchi on forced expiration  Abdomen:Soft, non tender to patient palpation  Diagnoses and all orders for this visit:    Urinary tract infection without hematuria, site unspecified    Other orders  -     sulfamethoxazole-trimethoprim 800-160mg (BACTRIM DS) 800-160 mg Tab; Take 1 tablet by mouth 2 (two) times daily. for 7 days  -     phenazopyridine (PYRIDIUM) 200 MG tablet; Take 1 tablet (200 mg total) by mouth 3 (three) times daily as needed for Pain.    If fails to improve or worsens will need OV

## 2022-04-27 NOTE — ASSESSMENT & PLAN NOTE
-chronic condition. Currently stable.    -reports compliance with hyperlipidemia treatment as prescribed  -denies any known adverse effects of medications  -recent labs listed below:  Lab Results   Component Value Date    CHOL 256 (H) 11/02/2021     Lab Results   Component Value Date    HDL 44 11/02/2021     Lab Results   Component Value Date    LDLCALC 175.4 (H) 11/02/2021     Lab Results   Component Value Date    TRIG 183 (H) 11/02/2021     Lab Results   Component Value Date    ALT 12 11/02/2021    AST 14 11/02/2021    ALKPHOS 88 11/02/2021    BILITOT 0.4 11/02/2021     Hyperlipidemia Medications             atorvastatin (LIPITOR) 40 MG tablet Take 1 tablet (40 mg total) by mouth once daily.

## 2022-04-28 NOTE — TELEPHONE ENCOUNTER
Noted.  Patient was started on antibiotics.  If no improvement she needs to follow-up with an in-person visit.

## 2022-05-13 ENCOUNTER — PATIENT MESSAGE (OUTPATIENT)
Dept: SMOKING CESSATION | Facility: CLINIC | Age: 72
End: 2022-05-13
Payer: MEDICARE

## 2022-05-13 ENCOUNTER — TELEPHONE (OUTPATIENT)
Dept: FAMILY MEDICINE | Facility: CLINIC | Age: 72
End: 2022-05-13
Payer: MEDICARE

## 2022-05-13 NOTE — TELEPHONE ENCOUNTER
----- Message from Tammy Ramirez sent at 5/13/2022 10:40 AM CDT -----  .Type:  Needs Medical Advice    Who Called: SHAWNA FERNANDES [[id number]]  Symptoms (please be specific):   How long has patient had these symptoms:    Pharmacy name and phone #:    Would the patient rather a call back or a response via [facility]?   Best Call Back Number:  [phone]  Additional Information:  Pt is requesting a call from office regarding her oxygen. Please call.

## 2022-05-13 NOTE — TELEPHONE ENCOUNTER
Patient states that she needs to have her oxygen ordered through a company that is covedred by medicare. She states that she did find a company that medicare would pay for, but does not have the information available to her at the moment. Patient states that she will call this afternoon with information on where to send orders.

## 2022-05-19 ENCOUNTER — LAB VISIT (OUTPATIENT)
Dept: LAB | Facility: HOSPITAL | Age: 72
End: 2022-05-19
Attending: FAMILY MEDICINE
Payer: MEDICARE

## 2022-05-19 ENCOUNTER — OFFICE VISIT (OUTPATIENT)
Dept: FAMILY MEDICINE | Facility: CLINIC | Age: 72
End: 2022-05-19
Payer: MEDICARE

## 2022-05-19 VITALS
SYSTOLIC BLOOD PRESSURE: 114 MMHG | TEMPERATURE: 97 F | HEIGHT: 61 IN | OXYGEN SATURATION: 96 % | HEART RATE: 81 BPM | BODY MASS INDEX: 31.87 KG/M2 | WEIGHT: 168.81 LBS | DIASTOLIC BLOOD PRESSURE: 80 MMHG

## 2022-05-19 DIAGNOSIS — Z12.31 ENCOUNTER FOR SCREENING MAMMOGRAM FOR BREAST CANCER: ICD-10-CM

## 2022-05-19 DIAGNOSIS — I71.40 AAA (ABDOMINAL AORTIC ANEURYSM) WITHOUT RUPTURE: ICD-10-CM

## 2022-05-19 DIAGNOSIS — E11.65 TYPE 2 DIABETES MELLITUS WITH HYPERGLYCEMIA, WITHOUT LONG-TERM CURRENT USE OF INSULIN: ICD-10-CM

## 2022-05-19 DIAGNOSIS — R06.02 SHORTNESS OF BREATH: ICD-10-CM

## 2022-05-19 DIAGNOSIS — J44.9 CHRONIC OBSTRUCTIVE PULMONARY DISEASE, UNSPECIFIED COPD TYPE: ICD-10-CM

## 2022-05-19 DIAGNOSIS — D75.1 POLYCYTHEMIA: ICD-10-CM

## 2022-05-19 DIAGNOSIS — J96.11 CHRONIC HYPOXEMIC RESPIRATORY FAILURE: ICD-10-CM

## 2022-05-19 DIAGNOSIS — E03.9 HYPOTHYROIDISM, UNSPECIFIED TYPE: ICD-10-CM

## 2022-05-19 DIAGNOSIS — Z00.00 WELL ADULT EXAM: ICD-10-CM

## 2022-05-19 DIAGNOSIS — Z79.899 ENCOUNTER FOR LONG-TERM (CURRENT) USE OF MEDICATIONS: ICD-10-CM

## 2022-05-19 DIAGNOSIS — I15.2 HYPERTENSION ASSOCIATED WITH DIABETES: Primary | ICD-10-CM

## 2022-05-19 DIAGNOSIS — Z13.6 ENCOUNTER FOR LIPID SCREENING FOR CARDIOVASCULAR DISEASE: ICD-10-CM

## 2022-05-19 DIAGNOSIS — Z23 NEED FOR PNEUMOCOCCAL VACCINE: ICD-10-CM

## 2022-05-19 DIAGNOSIS — R60.0 BILATERAL LOWER EXTREMITY EDEMA: ICD-10-CM

## 2022-05-19 DIAGNOSIS — E11.59 HYPERTENSION ASSOCIATED WITH DIABETES: Primary | ICD-10-CM

## 2022-05-19 DIAGNOSIS — Z13.220 ENCOUNTER FOR LIPID SCREENING FOR CARDIOVASCULAR DISEASE: ICD-10-CM

## 2022-05-19 PROBLEM — Z72.0 NICOTINE ABUSE: Status: ACTIVE | Noted: 2020-09-22

## 2022-05-19 PROBLEM — I10 HYPERTENSION, ESSENTIAL: Chronic | Status: ACTIVE | Noted: 2019-07-30

## 2022-05-19 LAB — ERYTHROCYTE [SEDIMENTATION RATE] IN BLOOD BY WESTERGREN METHOD: 0 MM/HR (ref 0–20)

## 2022-05-19 PROCEDURE — 84439 ASSAY OF FREE THYROXINE: CPT | Performed by: FAMILY MEDICINE

## 2022-05-19 PROCEDURE — 85025 COMPLETE CBC W/AUTO DIFF WBC: CPT | Performed by: FAMILY MEDICINE

## 2022-05-19 PROCEDURE — 83036 HEMOGLOBIN GLYCOSYLATED A1C: CPT | Performed by: FAMILY MEDICINE

## 2022-05-19 PROCEDURE — 85379 FIBRIN DEGRADATION QUANT: CPT | Performed by: FAMILY MEDICINE

## 2022-05-19 PROCEDURE — 90677 PCV20 VACCINE IM: CPT | Mod: PBBFAC,PO

## 2022-05-19 PROCEDURE — 84481 FREE ASSAY (FT-3): CPT | Performed by: FAMILY MEDICINE

## 2022-05-19 PROCEDURE — 99999 PR PBB SHADOW E&M-EST. PATIENT-LVL V: CPT | Mod: PBBFAC,,, | Performed by: FAMILY MEDICINE

## 2022-05-19 PROCEDURE — 86140 C-REACTIVE PROTEIN: CPT | Performed by: FAMILY MEDICINE

## 2022-05-19 PROCEDURE — 80061 LIPID PANEL: CPT | Performed by: FAMILY MEDICINE

## 2022-05-19 PROCEDURE — 99215 OFFICE O/P EST HI 40 MIN: CPT | Mod: S$PBB,,, | Performed by: FAMILY MEDICINE

## 2022-05-19 PROCEDURE — 99215 PR OFFICE/OUTPT VISIT, EST, LEVL V, 40-54 MIN: ICD-10-PCS | Mod: S$PBB,,, | Performed by: FAMILY MEDICINE

## 2022-05-19 PROCEDURE — 99215 OFFICE O/P EST HI 40 MIN: CPT | Mod: PBBFAC,PO | Performed by: FAMILY MEDICINE

## 2022-05-19 PROCEDURE — 36415 COLL VENOUS BLD VENIPUNCTURE: CPT | Mod: PO | Performed by: FAMILY MEDICINE

## 2022-05-19 PROCEDURE — 85651 RBC SED RATE NONAUTOMATED: CPT | Mod: PO | Performed by: FAMILY MEDICINE

## 2022-05-19 PROCEDURE — 84443 ASSAY THYROID STIM HORMONE: CPT | Performed by: FAMILY MEDICINE

## 2022-05-19 PROCEDURE — 83880 ASSAY OF NATRIURETIC PEPTIDE: CPT | Performed by: FAMILY MEDICINE

## 2022-05-19 PROCEDURE — 80053 COMPREHEN METABOLIC PANEL: CPT | Performed by: FAMILY MEDICINE

## 2022-05-19 PROCEDURE — 99999 PR PBB SHADOW E&M-EST. PATIENT-LVL V: ICD-10-PCS | Mod: PBBFAC,,, | Performed by: FAMILY MEDICINE

## 2022-05-19 NOTE — ASSESSMENT & PLAN NOTE
Check labs.  Patient has been taking a lot of ibuprofen recently for aches and pains.  Likely NSAID induced worsening edema.  will need to be checked however for heart failure with her chronic conditions.  Further Cardiology for further evaluation and treatment.    ER precautions.  Decrease NSAID use.

## 2022-05-19 NOTE — ASSESSMENT & PLAN NOTE
Check labs.  Patient has been set up with Pulmonary and Cardiology for further evaluation and treatment.  ER precautions for any severe symptoms.

## 2022-05-19 NOTE — ASSESSMENT & PLAN NOTE
Update labs.  Continue metformin.We will plan to monitor hemoglobin A1c at designated intervals 3 to 6 months.  I recommend ongoing Education for diabetic diet and exercise protocol.  We will continue to monitor for side effects.    Please be advised of symptoms to monitor for and to notify me immediately if persistent or worsening.  Follow up with Ophthalmology/Optometry and Podiatry at least annually.

## 2022-05-19 NOTE — PATIENT INSTRUCTIONS
Follow up in about 1 year (around 5/19/2023) for Annual Wellness Exam.     Dear patient,   As a result of recent federal legislation (The Federal Cures Act), you may receive lab or pathology results from your visit in your [facility] account before your physician is able to contact you. Your physician or their representative will relay the results to you with their recommendations at their soonest availability.     If no improvement in symptoms or symptoms worsen, please be advised to call MD, follow-up at clinic and/or go to ER if becomes severe.    Jose Steinberg M.D.        We Offer TELEHEALTH & Same Day Appointments!   Book your Telehealth appointment with me through my nurse or   Clinic appointments on [facility]!    [address] [place] [address]    Office: [phone]   FAX: [phone]    Check out my Facebook Page and Follow Me at: [email or web address]    Check out my website at [facility] [email or web address] clicking [email or web address]    To Schedule appointments online, go to [email or web address]: [email or web address]

## 2022-05-19 NOTE — PROGRESS NOTES
PLAN:      Problem List Items Addressed This Visit     AAA (abdominal aortic aneurysm) without rupture (Chronic)     History of aneurysm repair.  Follow up with vascular surgery.  ER precautions.           Chronic obstructive pulmonary disease (Chronic)     Update pneumonia vaccine.  Refilling continue inhalers.  Order was written for oxygen and so she can get back in with her pulmonary specialist.  COPD precautions.  ER precautions.  Assistance with smoking cessation was offered, including:  [x]  Medications  [x]  Counseling  [x]  Printed Information on Smoking Cessation  [x]  Referral to a Smoking Cessation Program    Patient was counseled regarding smoking for 3-10 minutes.               Relevant Orders    Ambulatory referral/consult to Pulmonology    OXYGEN FOR HOME USE    Shortness of breath (Chronic)     Check labs.  Patient has been set up with Pulmonary and Cardiology for further evaluation and treatment.  ER precautions for any severe symptoms.           Relevant Orders    BNP    D-Dimer, Quantitative    Sedimentation rate    C-Reactive Protein    OXYGEN FOR HOME USE    Encounter for long-term (current) use of medications (Chronic)     Complete history and physical was completed today.  Complete and thorough medication reconciliation was performed.  Discussed risks and benefits of medications.  Advised patient on orders and health maintenance.  We discussed old records and old labs if available.  Will request any records not available through epic.  Continue current medications listed on your summary sheet.             Hypertension, essential - Primary (Chronic)     Counseled on importance of hypertension disease course, I recommend ongoing Education for DASH-diet and exercise.  Counseled on medication regimen importance of treating high blood pressure.  Please be advised of risk of untreated blood pressure as discussed.  Please advised of ER precautions were given for symptoms of hypertensive urgency and  emergency.             Type 2 diabetes mellitus with hyperglycemia, without long-term current use of insulin (Chronic)     Update labs.  Continue metformin.We will plan to monitor hemoglobin A1c at designated intervals 3 to 6 months.  I recommend ongoing Education for diabetic diet and exercise protocol.  We will continue to monitor for side effects.    Please be advised of symptoms to monitor for and to notify me immediately if persistent or worsening.  Follow up with Ophthalmology/Optometry and Podiatry at least annually.             Relevant Orders    Microalbumin/Creatinine Ratio, Urine    Bilateral lower extremity edema (Chronic)     Check labs.  Patient has been taking a lot of ibuprofen recently for aches and pains.  Likely NSAID induced worsening edema.  will need to be checked however for heart failure with her chronic conditions.  Further Cardiology for further evaluation and treatment.    ER precautions.  Decrease NSAID use.           Relevant Orders    Ambulatory referral/consult to Cardiology    BNP    Chronic hypoxemic respiratory failure (Chronic)     Refill oxygen.  Referral to Pulmonary for further evaluation and treatment.  ER precautions prior           Relevant Orders    OXYGEN FOR HOME USE      Other Visit Diagnoses     Need for pneumococcal vaccine        Relevant Orders    (In Office Administered) Pneumococcal Conjugate Vaccine (20 Valent) (IM) (Completed)    Encounter for screening mammogram for breast cancer        Relevant Orders    Mammo Digital Screening Bilat w/ Corey        Future Appointments     Date Provider Specialty Appt Notes    5/19/2022  [facility]    5/19/2022  [facility]    6/13/2022 Joselito Petit Jr., MD Cardiology Hypertension associated with diabetes     6/22/2022 Cate Ferguson NP Hematology and Oncology blood    8/5/2022 Rachana Cronin NP Pulmonology Chronic obstructive pulmonary disease, unspecified     9/12/2022 Jose Steinberg MD Family Medicine follow up          Medication Management for assessment above:   Medication List with Changes/Refills   Current Medications    ALBUTEROL (VENTOLIN HFA) 90 MCG/ACTUATION INHALER    INHALE ONE OR TWO PUFFS BY MOUTH EVERY FOUR TO SIX HOURS AS NEEDED FOR WHEEZING    ALBUTEROL-IPRATROPIUM (DUO-NEB) 2.5 MG-0.5 MG/3 ML NEBULIZER SOLUTION    Take 3 mLs by nebulization every 4 (four) hours as needed for Wheezing or Shortness of Breath.    AMLODIPINE-BENAZEPRIL 10-20MG (LOTREL) 10-20 MG PER CAPSULE    Take 1 capsule by mouth once daily.    ANORO ELLIPTA 62.5-25 MCG/ACTUATION DSDV    INHALE ONE PUFF BY MOUTH DAILY    ATORVASTATIN (LIPITOR) 40 MG TABLET    Take 1 tablet (40 mg total) by mouth once daily.    B COMPLEX VITAMINS CAPSULE    Take 1 capsule by mouth once daily.    CALCIUM CARBONATE (OS-FREDY) 500 MG CALCIUM (1,250 MG) TABLET    Take 1 tablet (500 mg total) by mouth once daily.    CALCIUM-VITAMIN D3 (OS-FREDY 500 + D3) 500 MG(1,250MG) -200 UNIT PER TABLET    TAKE ONE TABLET BY MOUTH DAILY     CETIRIZINE (ZYRTEC) 10 MG TABLET    Take 1 tablet (10 mg total) by mouth once daily. for 10 days    LEVOTHYROXINE (SYNTHROID) 50 MCG TABLET    TAKE ONE TABLET BY MOUTH EVERY MORNING WITH BREAKFAST.    METFORMIN (GLUCOPHAGE) 1000 MG TABLET    Take 1 tablet (1,000 mg total) by mouth 2 (two) times daily with meals.    MULTIVITAMIN CAPSULE    Take 1 capsule by mouth once daily.    OFLOXACIN (OCUFLOX) 0.3 % OPHTHALMIC SOLUTION    Apply 5 drops into each ear twice daily for 7 days       Jose Steinberg M.D.  ==========================================================================  Subjective:   Patient ID: Kami Higgins is a 71 y.o. female.  has a past medical history of AAA (abdominal aortic aneurysm) without rupture, COPD (chronic obstructive pulmonary disease), Hyperlipidemia, Hypertension, Pulmonary nodule, and Thyroid disease.   Chief Complaint: Leg Swelling      Problem List Items Addressed This Visit     AAA (abdominal aortic aneurysm)  without rupture (Chronic)    Overview     Josiah Allison MD - 01/08/2021   Formatting of this note might be different from the original.   CT CHEST WO CONTRAST     CLINICAL INDICATION: right middle lobe atelectasis R91.1:Solitary pulmonary nodule     COMPARISON: 11/26/2019.     TECHNIQUE: CT scan of the chest was performed without IV contrast. Automated exposure control was utilized for dose reduction.     FINDINGS: There is chronic linear scarring within the right middle lobe. No suspicious pulmonary nodule. No consolidation. No pleural or pericardial effusion. No evidence of mediastinal adenopathy.     Aneurysmal dilatation of the distal thoracic esophagus, up to 3.8 cm in diameter.     IMPRESSION:   No suspicious nodule.  Exam End: 01/08/21 11:30 AM                Current Assessment & Plan     History of aneurysm repair.  Follow up with vascular surgery.  ER precautions.           Chronic obstructive pulmonary disease (Chronic)    Overview     Chronic.  Previous HPI:  Control uncertain.  Patient takes albuterol inhaler and also has a nebulizer at home.  Patient sees pulmonology.  Patient is supposed to be on oxygen.  May 2022:  Patient reports to me that she ran out of her oxygen.  She had a change in insurance and the previous company will not continue to provide her oxygen.  She needs a new order.  Her pulmonologist also retired so she needs a referral to Pulmonary.  She continues to use inhaler sparingly.  She continues to smoke.  She has not had the COVID vaccine.  She is due for pneumonia vaccine.           Current Assessment & Plan     Update pneumonia vaccine.  Refilling continue inhalers.  Order was written for oxygen and so she can get back in with her pulmonary specialist.  COPD precautions.  ER precautions.  Assistance with smoking cessation was offered, including:  [x]  Medications  [x]  Counseling  [x]  Printed Information on Smoking Cessation  [x]  Referral to a Smoking Cessation  Program    Patient was counseled regarding smoking for 3-10 minutes.               Shortness of breath (Chronic)    Overview     Chronic.  Due to underlying pulmonary disease.           Current Assessment & Plan     Check labs.  Patient has been set up with Pulmonary and Cardiology for further evaluation and treatment.  ER precautions for any severe symptoms.           Encounter for long-term (current) use of medications (Chronic)    Overview     07/30/2019 \CHRONIC long-term drug therapy for managed conditions. See medication list. Reports compliance.  No side effects reported.  Routine lab work is being monitored.  Patient does  need refills today.   Updating labs today.  Requesting records from [facility].  CHRONIC. Stable. Compliant with medications for managed conditions. See medication list. No SE reported.   Routine lab analysis is being monitored. Refills were addressed.  Lab Results   Component Value Date    WBC 8.16 11/02/2021    HGB 17.0 (H) 11/02/2021    HCT 53.1 (H) 11/02/2021     (H) 11/02/2021     11/02/2021         Chemistry        Component Value Date/Time     11/02/2021 1222    K 4.9 11/02/2021 1222     11/02/2021 1222    CO2 27 11/02/2021 1222    BUN 15 11/02/2021 1222    CREATININE 0.8 11/02/2021 1222    GLU 98 11/02/2021 1222        Component Value Date/Time    CALCIUM 9.5 11/02/2021 1222    ALKPHOS 88 11/02/2021 1222    AST 14 11/02/2021 1222    ALT 12 11/02/2021 1222    BILITOT 0.4 11/02/2021 1222    ESTGFRAFRICA >60.0 11/02/2021 1222    EGFRNONAA >60.0 11/02/2021 1222          Lab Results   Component Value Date    TSH 5.053 (H) 11/02/2021    M4AGFVZ 6.4 07/30/2019    FREET4 0.77 11/02/2021    T3FREE 2.6 07/30/2019                Current Assessment & Plan     Complete history and physical was completed today.  Complete and thorough medication reconciliation was performed.  Discussed risks and benefits of medications.  Advised patient on orders and health maintenance.   We discussed old records and old labs if available.  Will request any records not available through epic.  Continue current medications listed on your summary sheet.             Hypertension, essential - Primary (Chronic)    Overview     Initial HPI:  Chronic.  Stable.  Well controlled today.  On amlodipine benazepril combination.  Reports compliance.  No side effects reported.  Denies any chest pain shortness of breath blurred vision.  September 20th 2019Blood pressure stable today.  Patient taking amlodipine benazepril combo.  Reports compliance.  No side effects reported.   May 2022:  CHRONIC. STABLE. BP Reviewed.  Compliant with BP medications. No SE reported.   (-) CP, SOB, palpitations, dizziness, lightheadedness, HA, arm numbness, tingling or weakness, syncope.  Creatinine   Date Value Ref Range Status   11/02/2021 0.8 0.5 - 1.4 mg/dL Final                Current Assessment & Plan     Counseled on importance of hypertension disease course, I recommend ongoing Education for DASH-diet and exercise.  Counseled on medication regimen importance of treating high blood pressure.  Please be advised of risk of untreated blood pressure as discussed.  Please advised of ER precautions were given for symptoms of hypertensive urgency and emergency.             Type 2 diabetes mellitus with hyperglycemia, without long-term current use of insulin (Chronic)    Overview     07/30/2019 Chronic. Stable. No results found for: LABA1C, HGBA1C Patient taking metforminMed Compliance:  Abby Floyd?  [facility] Royce [facility] in [facility] exam?  [id number] Vaccine?  Unsure  May 2022:   Diabetes Management Status    Statin: Taking  ACE/ARB: Taking    Screening or Prevention Patient's value Goal Complete/Controlled?   HgA1C Testing and Control   Lab Results   Component Value Date    HGBA1C 5.6 11/02/2021      Annually/Less than 8% Yes   Lipid profile : 11/02/2021 Annually Yes   LDL control Lab Results   Component Value Date    LDLCALC  175.4 (H) 11/02/2021    Annually/Less than 100 mg/dl  No   Nephropathy screening Lab Results   Component Value Date    LABMICR 16.0 07/30/2019     Lab Results   Component Value Date    PROTEINUA Negative 08/20/2019     No results found for: UTPCR   Annually No   Blood pressure BP Readings from Last 1 Encounters:   05/19/22 114/80    Less than 140/90 Yes   Dilated retinal exam Most Recent Eye Exam Date: Not Found Annually No   Foot exam   Most Recent Foot Exam Date: Not Found Annually No                Current Assessment & Plan     Update labs.  Continue metformin.We will plan to monitor hemoglobin A1c at designated intervals 3 to 6 months.  I recommend ongoing Education for diabetic diet and exercise protocol.  We will continue to monitor for side effects.    Please be advised of symptoms to monitor for and to notify me immediately if persistent or worsening.  Follow up with Ophthalmology/Optometry and Podiatry at least annually.             Bilateral lower extremity edema (Chronic)    Overview     May 2022:  Patient is not wearing compression stockings as recommended.  She has been trying to decrease her salt intake.  Previous HPI:  Patient is a 71-year-old female who presents in clinic today as an established patient here for swelling to BLE. She reports that she has been on her feet more than usual this week. She has had no recent surgery. No recent travel. There is no pain to BLE. Denies redness, warmth, or tenderness. No prior history of DVT. There is no history of CKD or CHF. She has tried nothing for the symptoms. She has not had visit with PCP since 2019.     Mild, trace, non-pitting edema on exam. Recommend compression socks, low-salt diet, and elevation of legs.            Current Assessment & Plan     Check labs.  Patient has been taking a lot of ibuprofen recently for aches and pains.  Likely NSAID induced worsening edema.  will need to be checked however for heart failure with her chronic conditions.   Further Cardiology for further evaluation and treatment.    ER precautions.  Decrease NSAID use.           Chronic hypoxemic respiratory failure (Chronic)    Overview     Chronic.  Uncontrolled.  Patient is normally on continuous oxygen portable 2liters.  She reports that she ran out of oxygen and was unable to get refills.           Current Assessment & Plan     Refill oxygen.  Referral to Pulmonary for further evaluation and treatment.  ER precautions prior             Other Visit Diagnoses     Need for pneumococcal vaccine        Encounter for screening mammogram for breast cancer               Review of patient's allergies indicates:   Allergen Reactions    Aspirin, buffered     Codeine     Penicillins Hives    Singulair [montelukast]      Current Outpatient Medications   Medication Instructions    albuterol (VENTOLIN HFA) 90 mcg/actuation inhaler INHALE ONE OR TWO PUFFS BY MOUTH EVERY FOUR TO SIX HOURS AS NEEDED FOR WHEEZING     albuterol-ipratropium (DUO-NEB) 2.5 mg-0.5 mg/3 mL nebulizer solution 3 mLs, Nebulization, Every 4 hours PRN    amlodipine-benazepril 10-20mg (LOTREL) 10-20 mg per capsule 1 capsule, Oral, Daily    ANORO ELLIPTA 62.5-25 mcg/actuation DsDv INHALE ONE PUFF BY MOUTH DAILY     atorvastatin (LIPITOR) 40 mg, Oral, Daily    b complex vitamins capsule 1 capsule, Oral, Daily    calcium carbonate (OS-FREDY) 500 mg, Oral, Daily    calcium-vitamin D3 (OS-FREDY 500 + D3) 500 mg(1,250mg) -200 unit per tablet TAKE ONE TABLET BY MOUTH DAILY     cetirizine (ZYRTEC) 10 mg, Oral, Daily    levothyroxine (SYNTHROID) 50 MCG tablet TAKE ONE TABLET BY MOUTH EVERY MORNING WITH BREAKFAST.     metFORMIN (GLUCOPHAGE) 1,000 mg, Oral, 2 times daily with meals    multivitamin capsule 1 capsule, Oral, Daily    ofloxacin (OCUFLOX) 0.3 % ophthalmic solution Apply 5 drops into each ear twice daily for 7 days      I have reviewed the PMH, social history, FamilyHx, surgical history, allergies and medications  "documented / confirmed by the patient at the time of this visit.  Review of Systems   Constitutional: Negative for appetite change, chills, fatigue and fever.   HENT: Negative for congestion, rhinorrhea and sore throat.    Eyes: Negative for visual disturbance.   Respiratory: Positive for shortness of breath (chronic).    Cardiovascular: Positive for leg swelling. Negative for chest pain and palpitations.   Gastrointestinal: Negative for abdominal pain, diarrhea and vomiting.   Genitourinary: Negative for difficulty urinating, dysuria and hematuria.   Musculoskeletal: Negative for arthralgias and myalgias.   Skin: Negative for rash and wound.   Neurological: Negative for dizziness and headaches.   Psychiatric/Behavioral: Negative for behavioral problems. The patient is not nervous/anxious.      Objective:   /80   Pulse 81   Temp 97.1 °F (36.2 °C) (Other (see comments))   Ht 5' 1" (1.549 m)   Wt 76.6 kg (168 lb 12.8 oz)   SpO2 96% Comment: 6liters 02  BMI 31.89 kg/m²   Physical Exam  Vitals reviewed.   Constitutional:       General: She is not in acute distress.     Appearance: Normal appearance. She is well-developed. She is not ill-appearing, toxic-appearing or diaphoretic.   HENT:      Head: Normocephalic and atraumatic.      Right Ear: Hearing and external ear normal.      Left Ear: Hearing and external ear normal.   Eyes:      General: Lids are normal.      Extraocular Movements: Extraocular movements intact.      Conjunctiva/sclera: Conjunctivae normal.   Cardiovascular:      Rate and Rhythm: Normal rate.      Heart sounds: Normal heart sounds.   Pulmonary:      Effort: Pulmonary effort is normal. No respiratory distress.      Comments: On 2L NC  Abdominal:      General: Bowel sounds are normal.      Palpations: Abdomen is soft.   Musculoskeletal:         General: Normal range of motion.      Cervical back: Normal range of motion.      Right lower leg: Edema (trace, nonpitting) present.      Left " lower leg: Edema (trace, nonpitting) present.   Skin:     General: Skin is warm and dry.      Coloration: Skin is not pale.      Findings: No rash.   Neurological:      Mental Status: She is alert and oriented to person, place, and time. Mental status is at baseline. She is not disoriented.   Psychiatric:         Attention and Perception: She is attentive.         Mood and Affect: Mood normal. Mood is not anxious or depressed.         Speech: Speech normal. Speech is not rapid and pressured or slurred.         Behavior: Behavior normal. Behavior is not agitated, aggressive or hyperactive. Behavior is cooperative.         Thought Content: Thought content normal. Thought content is not paranoid or delusional. Thought content does not include homicidal or suicidal ideation. Thought content does not include homicidal or suicidal plan.         Cognition and Memory: Memory is not impaired.         Judgment: Judgment normal.        Patient is wearing a mask which limits physical exam due to COVID restrictions.   Assessment:     1. Hypertension associated with diabetes    2. Bilateral lower extremity edema    3. Type 2 diabetes mellitus with hyperglycemia, without long-term current use of insulin    4. Encounter for long-term (current) use of medications    5. Chronic obstructive pulmonary disease, unspecified COPD type    6. Shortness of breath    7. Chronic hypoxemic respiratory failure    8. Need for pneumococcal vaccine    9. Encounter for screening mammogram for breast cancer    10. AAA (abdominal aortic aneurysm) without rupture      MDM:   High medical complexity.  Moderate high risk.  Total time: 45 minutes.  This includes total time spent on the encounter, which includes face to face time and non-face to face time preparing to see the patient (eg, review of previous medical records, tests), Obtaining and/or reviewing separately obtained history, documenting clinical information in the electronic or other health  "record, independently interpreting results (not separately reported)/communicating results to the patient/family/caregiver, and/or care coordination (not separately reported).    I have Reviewed and summarized old records.  I have performed thorough medication reconciliation today and discussed risk and benefits of medications.  I have reviewed labs and discussed with patient.  All questions were answered.  I am requesting old records and will review them once they are available.    I have signed for the following orders AND/OR meds.  Orders Placed This Encounter   Procedures    OXYGEN FOR HOME USE     Order Specific Question:   Liter Flow     Answer:   2     Order Specific Question:   Duration     Answer:   Continuous     Order Specific Question:   Qualifying Test Performed at:     Answer:   Rest     Order Specific Question:   Oxygen saturation:     Answer:   80     Order Specific Question:   Portable mode:     Answer:   continuous     Order Specific Question:   Route     Answer:   nasal cannula     Order Specific Question:   Device:     Answer:   home concentrator with portable tanks     Order Specific Question:   Length of need (in months):     Answer:   99 mos     Order Specific Question:   Patient condition with qualifying saturation     Answer:   COPD     Order Specific Question:   Height:     Answer:   5' 1" (1.549 m)     Order Specific Question:   Weight:     Answer:   76.6 kg (168 lb 12.8 oz)     Order Specific Question:   Alternative treatment measures have been tried or considered and deemed clinically ineffective.     Answer:   Yes    Mammo Digital Screening Bilat w/ Corey     Standing Status:   Standing     Number of Occurrences:   99     Standing Expiration Date:   4/30/2037     Order Specific Question:   May the Radiologist modify the order per protocol to meet the clinical needs of the patient?     Answer:   Yes    (In Office Administered) Pneumococcal Conjugate Vaccine (20 Valent) (IM)    BNP     " Standing Status:   Future     Number of Occurrences:   1     Standing Expiration Date:   5/20/2023    D-Dimer, Quantitative     Standing Status:   Future     Number of Occurrences:   1     Standing Expiration Date:   5/19/2023    Sedimentation rate     Standing Status:   Future     Number of Occurrences:   1     Standing Expiration Date:   7/18/2023    C-Reactive Protein     Standing Status:   Future     Number of Occurrences:   1     Standing Expiration Date:   7/18/2023    Microalbumin/Creatinine Ratio, Urine     Standing Status:   Future     Number of Occurrences:   1     Standing Expiration Date:   7/18/2023     Order Specific Question:   Specimen Source     Answer:   Urine    Ambulatory referral/consult to Pulmonology     Standing Status:   Future     Standing Expiration Date:   6/19/2023     Referral Priority:   Routine     Referral Type:   Consultation     Referral Reason:   Specialty Services Required     Requested Specialty:   Pulmonary Disease     Number of Visits Requested:   1    Ambulatory referral/consult to Cardiology     Standing Status:   Future     Standing Expiration Date:   6/19/2023     Referral Priority:   Routine     Referral Type:   Consultation     Referral Reason:   Specialty Services Required     Requested Specialty:   Cardiology     Number of Visits Requested:   1           Follow up in about 1 year (around 5/19/2023) for Annual Wellness Exam.    If no improvement in symptoms or symptoms worsen, advised to call/follow-up at clinic or go to ER. Patient voiced understanding and all questions/concerns were addressed.   DISCLAIMER: This note was compiled by using a speech recognition dictation system and therefore please be aware that typographical / speech recognition errors can and do occur.  Please contact me if you see any errors specifically.    Jose Steinberg M.D.       Office: [phone]   [address] [place] [address]  FAX: [phone]

## 2022-05-19 NOTE — ASSESSMENT & PLAN NOTE
Counseled on importance of hypertension disease course, I recommend ongoing Education for DASH-diet and exercise.  Counseled on medication regimen importance of treating high blood pressure.  Please be advised of risk of untreated blood pressure as discussed.  Please advised of ER precautions were given for symptoms of hypertensive urgency and emergency.

## 2022-05-19 NOTE — ASSESSMENT & PLAN NOTE
Update pneumonia vaccine.  Refilling continue inhalers.  Order was written for oxygen and so she can get back in with her pulmonary specialist.  COPD precautions.  ER precautions.  Assistance with smoking cessation was offered, including:  [x]  Medications  [x]  Counseling  [x]  Printed Information on Smoking Cessation  [x]  Referral to a Smoking Cessation Program    Patient was counseled regarding smoking for 3-10 minutes.

## 2022-05-20 ENCOUNTER — TELEPHONE (OUTPATIENT)
Dept: FAMILY MEDICINE | Facility: CLINIC | Age: 72
End: 2022-05-20
Payer: MEDICARE

## 2022-05-20 DIAGNOSIS — R07.9 CHEST PAIN, UNSPECIFIED TYPE: ICD-10-CM

## 2022-05-20 LAB
ALBUMIN SERPL BCP-MCNC: 4 G/DL (ref 3.5–5.2)
ALP SERPL-CCNC: 93 U/L (ref 55–135)
ALT SERPL W/O P-5'-P-CCNC: 13 U/L (ref 10–44)
ANION GAP SERPL CALC-SCNC: 9 MMOL/L (ref 8–16)
AST SERPL-CCNC: 16 U/L (ref 10–40)
BASOPHILS # BLD AUTO: 0.07 K/UL (ref 0–0.2)
BASOPHILS NFR BLD: 0.9 % (ref 0–1.9)
BILIRUB SERPL-MCNC: 0.8 MG/DL (ref 0.1–1)
BNP SERPL-MCNC: 142 PG/ML (ref 0–99)
BUN SERPL-MCNC: 13 MG/DL (ref 8–23)
CALCIUM SERPL-MCNC: 9.5 MG/DL (ref 8.7–10.5)
CHLORIDE SERPL-SCNC: 101 MMOL/L (ref 95–110)
CHOLEST SERPL-MCNC: 127 MG/DL (ref 120–199)
CHOLEST/HDLC SERPL: 3.1 {RATIO} (ref 2–5)
CO2 SERPL-SCNC: 29 MMOL/L (ref 23–29)
CREAT SERPL-MCNC: 0.7 MG/DL (ref 0.5–1.4)
CRP SERPL-MCNC: 0.8 MG/L (ref 0–8.2)
D DIMER PPP IA.FEU-MCNC: 2.85 MG/L FEU
DIFFERENTIAL METHOD: ABNORMAL
EOSINOPHIL # BLD AUTO: 0.1 K/UL (ref 0–0.5)
EOSINOPHIL NFR BLD: 1.5 % (ref 0–8)
ERYTHROCYTE [DISTWIDTH] IN BLOOD BY AUTOMATED COUNT: 17.8 % (ref 11.5–14.5)
EST. GFR  (AFRICAN AMERICAN): >60 ML/MIN/1.73 M^2
EST. GFR  (NON AFRICAN AMERICAN): >60 ML/MIN/1.73 M^2
ESTIMATED AVG GLUCOSE: 123 MG/DL (ref 68–131)
GLUCOSE SERPL-MCNC: 84 MG/DL (ref 70–110)
HBA1C MFR BLD: 5.9 % (ref 4–5.6)
HCT VFR BLD AUTO: 57.7 % (ref 37–48.5)
HDLC SERPL-MCNC: 41 MG/DL (ref 40–75)
HDLC SERPL: 32.3 % (ref 20–50)
HGB BLD-MCNC: 17.5 G/DL (ref 12–16)
IMM GRANULOCYTES # BLD AUTO: 0.02 K/UL (ref 0–0.04)
IMM GRANULOCYTES NFR BLD AUTO: 0.3 % (ref 0–0.5)
LDLC SERPL CALC-MCNC: 69.4 MG/DL (ref 63–159)
LYMPHOCYTES # BLD AUTO: 1.7 K/UL (ref 1–4.8)
LYMPHOCYTES NFR BLD: 23 % (ref 18–48)
MCH RBC QN AUTO: 29.3 PG (ref 27–31)
MCHC RBC AUTO-ENTMCNC: 30.3 G/DL (ref 32–36)
MCV RBC AUTO: 97 FL (ref 82–98)
MONOCYTES # BLD AUTO: 0.7 K/UL (ref 0.3–1)
MONOCYTES NFR BLD: 8.8 % (ref 4–15)
NEUTROPHILS # BLD AUTO: 4.9 K/UL (ref 1.8–7.7)
NEUTROPHILS NFR BLD: 65.5 % (ref 38–73)
NONHDLC SERPL-MCNC: 86 MG/DL
NRBC BLD-RTO: 0 /100 WBC
PLATELET # BLD AUTO: 179 K/UL (ref 150–450)
PMV BLD AUTO: 10.8 FL (ref 9.2–12.9)
POTASSIUM SERPL-SCNC: 4.8 MMOL/L (ref 3.5–5.1)
PROT SERPL-MCNC: 7.4 G/DL (ref 6–8.4)
RBC # BLD AUTO: 5.97 M/UL (ref 4–5.4)
SODIUM SERPL-SCNC: 139 MMOL/L (ref 136–145)
T3FREE SERPL-MCNC: 2.5 PG/ML (ref 2.3–4.2)
T4 FREE SERPL-MCNC: 0.75 NG/DL (ref 0.71–1.51)
TRIGL SERPL-MCNC: 83 MG/DL (ref 30–150)
TSH SERPL DL<=0.005 MIU/L-ACNC: 2.61 UIU/ML (ref 0.4–4)
WBC # BLD AUTO: 7.47 K/UL (ref 3.9–12.7)

## 2022-05-20 NOTE — TELEPHONE ENCOUNTER
----- Message from Trina Molina sent at 5/20/2022  1:51 PM CDT -----  Please call Claude/Tj @ [phone], opt 2, regarding pt oxygen, need to speak Shanelle

## 2022-05-20 NOTE — PROGRESS NOTES
Make follow-up lab appointment per recommendation below.  Check to see if patient has seen the results through my chart.  If not then,  #CALL THE PATIENT# to discuss results/see if they have questions and document verification of contact. Make F/U appt if needed. [phone]    #My interpretation that was sent to them through [employer]:  Kami, I have reviewed your recent blood work.   BNP test for heart failure is mildly elevated.  Follow-up with Cardiology soon.  CRP and Sedimentation rate inflammatory marker is normal.  Your complete blood count is abnormal.  Hemoglobin and hematocrit are too high.  Follow-up with Hematology.  Your metabolic panel which shows your glucose, kidney function, electrolytes, and liver function is normal.   Thyroid study is normal.   Your cholesterol is improved from previous.  Continue current medication.  Your hemoglobin A1c is stable.  Level is consistent with well-controlled diabetes.  This test is gold standard screening test for diabetes.  It is a measures 3 months of your average blood sugar.    =========================  Also please address any outstanding health maintenance that may be due: COVID-19 Vaccine(1) Never done  Foot Exam Never done  Eye Exam Never done  Colorectal Cancer Screening due on 08/22/2019  Mammogram due on 07/30/2020

## 2022-05-20 NOTE — TELEPHONE ENCOUNTER
Spoke with patient and she states that  [facility] would no longer be able to supply her oxygen and supplies due to her insurance.Patient would now like to get oxygen through [facility].

## 2022-05-20 NOTE — PROGRESS NOTES
The D-dimer is abnormal.   -Book a CT angiogram chest ASAP and schedule follow up with me after this is done.      If this cannot be performed patient needs to go to the ER if having shortness of breath as she could have a blood clot in her lungs and will need to be evaluated there.    Jose Steinberg MD  We Offer Telehealth & Same Day Appointments!   Book your Telehealth appointment with me through my nurse or   Clinic appointments on [employer]!  [phone]     Check out my Facebook Page and Follow Me at: [email or web address]    Check out my website at [employer] by clicking on: [email or web address]    To Schedule appointments online, go to [employer]: [email or web address]

## 2022-05-20 NOTE — TELEPHONE ENCOUNTER
Spoke with Moon at [facility] and she states that Ms. Higgins in under contract with their company for 2 more years with her oxygen and they will continue to service her equipment and deliver her supplies.   will reach out to Ms. Higgins to let her know and explain everything to her.

## 2022-05-23 ENCOUNTER — TELEPHONE (OUTPATIENT)
Dept: FAMILY MEDICINE | Facility: CLINIC | Age: 72
End: 2022-05-23
Payer: MEDICARE

## 2022-05-23 NOTE — TELEPHONE ENCOUNTER
----- Message from Trina Molina sent at 5/23/2022 11:06 AM CDT -----  Please call Isabel/Tj @ [phone] regarding order for pt Ventilator.

## 2022-05-24 ENCOUNTER — PATIENT OUTREACH (OUTPATIENT)
Dept: ADMINISTRATIVE | Facility: HOSPITAL | Age: 72
End: 2022-05-24
Payer: MEDICARE

## 2022-05-24 NOTE — PROGRESS NOTES
Uploaded MARIO ALBERTO COLONOSCOPY 5/15/2022 ; faxed to [facility] 1x to request. Remind me set 1 week.

## 2022-05-27 ENCOUNTER — TELEPHONE (OUTPATIENT)
Dept: FAMILY MEDICINE | Facility: CLINIC | Age: 72
End: 2022-05-27
Payer: MEDICARE

## 2022-05-27 NOTE — TELEPHONE ENCOUNTER
----- Message from Joseline Hansen sent at 5/27/2022 11:43 AM CDT -----  .Type:  Test Results    Who Called: .Kami Higgins    Name of Test (Lab/Mammo/Etc): CT  Date of Test: 05/23/22  Ordering Provider: George  Where the test was performed: [facility]   Would the patient rather a call back or a response via [facility]? Call back  Best Call Back Number: .[phone] (home)     Additional Information:

## 2022-05-31 ENCOUNTER — PATIENT MESSAGE (OUTPATIENT)
Dept: FAMILY MEDICINE | Facility: CLINIC | Age: 72
End: 2022-05-31
Payer: MEDICARE

## 2022-05-31 NOTE — PROGRESS NOTES
2nd attempt  MARIO ALBERTO COLONOSCOPY 5/15/2022 ; faxed to [facility] 1x to request. Remind me set 1 week.

## 2022-06-15 ENCOUNTER — TELEPHONE (OUTPATIENT)
Dept: FAMILY MEDICINE | Facility: CLINIC | Age: 72
End: 2022-06-15
Payer: MEDICARE

## 2022-06-15 NOTE — TELEPHONE ENCOUNTER
----- Message from Dorcas Vu sent at 6/15/2022 10:18 AM CDT -----  Contact: Consuelo Cordero with [employer] would like return call in regards of orders that was faxed over for pt, please call back at [phone] or cell [phone]. Fax number is [phone]

## 2022-06-15 NOTE — TELEPHONE ENCOUNTER
Spoke with Consuelo at [facility] to let her know that we received her fax and will return once Dr. Steinberg has signed.

## 2022-06-17 DIAGNOSIS — Z76.89 ESTABLISHING CARE WITH NEW DOCTOR, ENCOUNTER FOR: Primary | ICD-10-CM

## 2022-06-22 ENCOUNTER — LAB VISIT (OUTPATIENT)
Dept: LAB | Facility: HOSPITAL | Age: 72
End: 2022-06-22
Attending: NURSE PRACTITIONER
Payer: MEDICARE

## 2022-06-22 ENCOUNTER — OFFICE VISIT (OUTPATIENT)
Dept: HEMATOLOGY/ONCOLOGY | Facility: CLINIC | Age: 72
End: 2022-06-22
Payer: MEDICARE

## 2022-06-22 ENCOUNTER — TELEPHONE (OUTPATIENT)
Dept: HEMATOLOGY/ONCOLOGY | Facility: CLINIC | Age: 72
End: 2022-06-22

## 2022-06-22 VITALS
SYSTOLIC BLOOD PRESSURE: 132 MMHG | HEART RATE: 85 BPM | DIASTOLIC BLOOD PRESSURE: 76 MMHG | OXYGEN SATURATION: 84 % | BODY MASS INDEX: 30.71 KG/M2 | WEIGHT: 162.69 LBS | HEIGHT: 61 IN

## 2022-06-22 DIAGNOSIS — R09.02 HYPOXIA: ICD-10-CM

## 2022-06-22 DIAGNOSIS — R19.5 POSITIVE COLORECTAL CANCER SCREENING USING COLOGUARD TEST: ICD-10-CM

## 2022-06-22 DIAGNOSIS — Z72.0 NICOTINE ABUSE: ICD-10-CM

## 2022-06-22 DIAGNOSIS — D75.1 POLYCYTHEMIA SECONDARY TO HYPOXIA: ICD-10-CM

## 2022-06-22 DIAGNOSIS — E87.5 HYPERKALEMIA: ICD-10-CM

## 2022-06-22 DIAGNOSIS — R91.1 PULMONARY NODULE: ICD-10-CM

## 2022-06-22 DIAGNOSIS — E53.8 B12 DEFICIENCY: ICD-10-CM

## 2022-06-22 DIAGNOSIS — D75.1 SECONDARY POLYCYTHEMIA: Primary | ICD-10-CM

## 2022-06-22 DIAGNOSIS — J43.8 OTHER EMPHYSEMA: ICD-10-CM

## 2022-06-22 DIAGNOSIS — Z12.31 ENCOUNTER FOR SCREENING MAMMOGRAM FOR MALIGNANT NEOPLASM OF BREAST: ICD-10-CM

## 2022-06-22 DIAGNOSIS — N63.20 LEFT BREAST MASS: ICD-10-CM

## 2022-06-22 DIAGNOSIS — F17.210 CIGARETTE SMOKER: ICD-10-CM

## 2022-06-22 DIAGNOSIS — D75.1 SECONDARY POLYCYTHEMIA: ICD-10-CM

## 2022-06-22 LAB
ALBUMIN SERPL BCP-MCNC: 4.2 G/DL (ref 3.5–5.2)
ALP SERPL-CCNC: 92 U/L (ref 55–135)
ALT SERPL W/O P-5'-P-CCNC: 10 U/L (ref 10–44)
ANION GAP SERPL CALC-SCNC: 9 MMOL/L (ref 8–16)
AST SERPL-CCNC: 16 U/L (ref 10–40)
BASOPHILS # BLD AUTO: 0.06 K/UL (ref 0–0.2)
BASOPHILS NFR BLD: 0.7 % (ref 0–1.9)
BILIRUB SERPL-MCNC: 0.8 MG/DL (ref 0.1–1)
BUN SERPL-MCNC: 21 MG/DL (ref 8–23)
CALCIUM SERPL-MCNC: 9.8 MG/DL (ref 8.7–10.5)
CHLORIDE SERPL-SCNC: 100 MMOL/L (ref 95–110)
CO2 SERPL-SCNC: 30 MMOL/L (ref 23–29)
CREAT SERPL-MCNC: 0.9 MG/DL (ref 0.5–1.4)
DIFFERENTIAL METHOD: ABNORMAL
EOSINOPHIL # BLD AUTO: 0.1 K/UL (ref 0–0.5)
EOSINOPHIL NFR BLD: 1.2 % (ref 0–8)
ERYTHROCYTE [DISTWIDTH] IN BLOOD BY AUTOMATED COUNT: 19.5 % (ref 11.5–14.5)
EST. GFR  (AFRICAN AMERICAN): >60 ML/MIN/1.73 M^2
EST. GFR  (NON AFRICAN AMERICAN): >60 ML/MIN/1.73 M^2
GLUCOSE SERPL-MCNC: 85 MG/DL (ref 70–110)
HCT VFR BLD AUTO: 58.5 % (ref 37–48.5)
HGB BLD-MCNC: 18.8 G/DL (ref 12–16)
IMM GRANULOCYTES # BLD AUTO: 0.02 K/UL (ref 0–0.04)
IMM GRANULOCYTES NFR BLD AUTO: 0.2 % (ref 0–0.5)
LYMPHOCYTES # BLD AUTO: 2.1 K/UL (ref 1–4.8)
LYMPHOCYTES NFR BLD: 23.8 % (ref 18–48)
MCH RBC QN AUTO: 29.7 PG (ref 27–31)
MCHC RBC AUTO-ENTMCNC: 32.1 G/DL (ref 32–36)
MCV RBC AUTO: 93 FL (ref 82–98)
MONOCYTES # BLD AUTO: 0.7 K/UL (ref 0.3–1)
MONOCYTES NFR BLD: 7.6 % (ref 4–15)
NEUTROPHILS # BLD AUTO: 5.7 K/UL (ref 1.8–7.7)
NEUTROPHILS NFR BLD: 66.7 % (ref 38–73)
NRBC BLD-RTO: 0 /100 WBC
PLATELET # BLD AUTO: 180 K/UL (ref 150–450)
PMV BLD AUTO: 10.5 FL (ref 9.2–12.9)
POTASSIUM SERPL-SCNC: 5.6 MMOL/L (ref 3.5–5.1)
PROT SERPL-MCNC: 7.4 G/DL (ref 6–8.4)
RBC # BLD AUTO: 6.32 M/UL (ref 4–5.4)
SODIUM SERPL-SCNC: 139 MMOL/L (ref 136–145)
WBC # BLD AUTO: 8.6 K/UL (ref 3.9–12.7)

## 2022-06-22 PROCEDURE — 99214 OFFICE O/P EST MOD 30 MIN: CPT | Mod: PBBFAC,PN | Performed by: NURSE PRACTITIONER

## 2022-06-22 PROCEDURE — 82668 ASSAY OF ERYTHROPOIETIN: CPT | Performed by: NURSE PRACTITIONER

## 2022-06-22 PROCEDURE — 36415 COLL VENOUS BLD VENIPUNCTURE: CPT | Mod: PO | Performed by: NURSE PRACTITIONER

## 2022-06-22 PROCEDURE — 99999 PR PBB SHADOW E&M-EST. PATIENT-LVL IV: ICD-10-PCS | Mod: PBBFAC,,, | Performed by: NURSE PRACTITIONER

## 2022-06-22 PROCEDURE — 99999 PR PBB SHADOW E&M-EST. PATIENT-LVL IV: CPT | Mod: PBBFAC,,, | Performed by: NURSE PRACTITIONER

## 2022-06-22 PROCEDURE — 99205 OFFICE O/P NEW HI 60 MIN: CPT | Mod: S$PBB,,, | Performed by: NURSE PRACTITIONER

## 2022-06-22 PROCEDURE — 99205 PR OFFICE/OUTPT VISIT, NEW, LEVL V, 60-74 MIN: ICD-10-PCS | Mod: S$PBB,,, | Performed by: NURSE PRACTITIONER

## 2022-06-22 PROCEDURE — 85025 COMPLETE CBC W/AUTO DIFF WBC: CPT | Mod: PO | Performed by: NURSE PRACTITIONER

## 2022-06-22 PROCEDURE — 80053 COMPREHEN METABOLIC PANEL: CPT | Performed by: NURSE PRACTITIONER

## 2022-06-22 RX ORDER — LIDOCAINE HYDROCHLORIDE 10 MG/ML
1 INJECTION, SOLUTION EPIDURAL; INFILTRATION; INTRACAUDAL; PERINEURAL ONCE
OUTPATIENT
Start: 2022-06-22 | End: 2022-06-22

## 2022-06-22 NOTE — TELEPHONE ENCOUNTER
"Spoke with pt per Cate Ferguson-" no phlebotomy need right now". Pt was notified. Also nurse informed pt of scheduled monthly labs. Pt verbalized understanding  "

## 2022-06-22 NOTE — PROGRESS NOTES
Subjective:      Patient ID: Kami Higgins is a 71 y.o. female.    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  5/19/2022 with hct 57.7 and found with elevated D-dimer.  CTA done 5/23/2022 - no evidence of PE.  States that she recently received a portable oxygen tank that is able to transport.  She is currently only using her oxygen at night.  States that she does continue to smoke, but not as much (about less then 10 per day vs. 2 packs per day)    She ran out of her oxygen 5/2022 and was unable to get new order due to change in insurance and pulmonologist retiring.       She also has been found with a 4 mm left upper lobe on CT that needs that requires monitoring every 6 months followed by pulmonology.  She is schedule as a new patient visit with Rachana Cronin NP on 8/5/2022 7/30/2019  Mammogram  Left  There is a 9 mm oval mass with circumscribed margins seen in the retroareolar region of the left breast.   Right  There is no evidence of suspicious masses, calcifications, or other abnormal findings.   Impression:  Left  Mass: Left breast 9 mm mass at the retroareolar position. Assessment: 0 - Incomplete. Ultrasound is recommended.   Right  There is no mammographic evidence of malignancy.  BI-RADS Category:   Overall: 0 - Incomplete: Needs Additional Imaging Evaluation   Recommendation:  Breast ultrasound is recommended.  8/5/2019 US left breast   Impression:  Left  Mass: Left breast 5 mm x 5 mm x 4 mm mass. Assessment: 3 - Probably benign. Short Interval Follow-Up in 6 Months is recommended.   BI-RADS Category:   Left: 3 - Probably Benign  Overall: 3 - Probably Benign  Recommendation:  Short interval follow-up is recommended in 6 Months    8/2019 Positive Cologuard testing         Social History     Socioeconomic History     Marital status:    Tobacco Use    Smoking status: Current Some Day Smoker     Packs/day: 0.50    Smokeless tobacco: Never Used   Substance and Sexual Activity    Alcohol use: Never    Drug use: Never    Sexual activity: Not Currently     Partners: Male       Family History   Problem Relation Age of Onset    Colon cancer Neg Hx     Stomach cancer Neg Hx     Esophageal cancer Neg Hx        Past Surgical History:   Procedure Laterality Date    ABDOMINAL AORTIC ANEURYSM REPAIR      cardiac catheterization  2017    HYSTERECTOMY      THYROIDECTOMY      TUBAL LIGATION         Past Medical History:   Diagnosis Date    AAA (abdominal aortic aneurysm) without rupture     COPD (chronic obstructive pulmonary disease)     Hyperlipidemia     Hypertension     Pulmonary nodule     Thyroid disease        Review of Systems   Constitutional: Negative.    HENT: Negative.    Eyes: Negative.    Respiratory: Positive for shortness of breath (only if house is haley or around hay).    Gastrointestinal: Negative.    Endocrine: Negative.    Genitourinary: Negative.    Musculoskeletal: Negative.    Skin: Negative.    Allergic/Immunologic: Negative.           Medication List with Changes/Refills   Current Medications    ALBUTEROL (VENTOLIN HFA) 90 MCG/ACTUATION INHALER    INHALE ONE OR TWO PUFFS BY MOUTH EVERY FOUR TO SIX HOURS AS NEEDED FOR WHEEZING    ALBUTEROL-IPRATROPIUM (DUO-NEB) 2.5 MG-0.5 MG/3 ML NEBULIZER SOLUTION    Take 3 mLs by nebulization every 4 (four) hours as needed for Wheezing or Shortness of Breath.    AMLODIPINE-BENAZEPRIL 10-20MG (LOTREL) 10-20 MG PER CAPSULE    Take 1 capsule by mouth once daily.    ANORO ELLIPTA 62.5-25 MCG/ACTUATION DSDV    INHALE ONE PUFF BY MOUTH DAILY    ATORVASTATIN (LIPITOR) 40 MG TABLET    Take 1 tablet (40 mg total) by mouth once daily.    B COMPLEX VITAMINS CAPSULE    Take 1 capsule by mouth once daily.    CALCIUM CARBONATE (OS-FREDY) 500 MG CALCIUM (1,250 MG) TABLET     Take 1 tablet (500 mg total) by mouth once daily.    CALCIUM-VITAMIN D3 (OS-FREDY 500 + D3) 500 MG(1,250MG) -200 UNIT PER TABLET    TAKE ONE TABLET BY MOUTH DAILY     CETIRIZINE (ZYRTEC) 10 MG TABLET    Take 1 tablet (10 mg total) by mouth once daily. for 10 days    LEVOTHYROXINE (SYNTHROID) 50 MCG TABLET    TAKE ONE TABLET BY MOUTH EVERY MORNING WITH BREAKFAST.    METFORMIN (GLUCOPHAGE) 1000 MG TABLET    Take 1 tablet (1,000 mg total) by mouth 2 (two) times daily with meals.    MULTIVITAMIN CAPSULE    Take 1 capsule by mouth once daily.    OFLOXACIN (OCUFLOX) 0.3 % OPHTHALMIC SOLUTION    Apply 5 drops into each ear twice daily for 7 days        Objective:     Vitals:    06/22/22 1049   BP: 132/76   Pulse: 85       Physical Exam  Vitals and nursing note reviewed.   Constitutional:       Appearance: Normal appearance.   HENT:      Head: Normocephalic and atraumatic.      Right Ear: External ear normal.      Left Ear: External ear normal.   Cardiovascular:      Rate and Rhythm: Normal rate and regular rhythm.      Heart sounds: Normal heart sounds, S1 normal and S2 normal.   Pulmonary:      Effort: Pulmonary effort is normal.      Breath sounds: Normal breath sounds. Decreased air movement present. No decreased breath sounds.   Abdominal:      General: There is no distension.   Musculoskeletal:         General: Normal range of motion.      Cervical back: Normal range of motion.   Skin:     General: Skin is warm and dry.   Neurological:      General: No focal deficit present.      Mental Status: She is alert and oriented to person, place, and time.   Psychiatric:         Attention and Perception: Attention and perception normal.         Mood and Affect: Mood and affect normal.         Speech: Speech normal.         Behavior: Behavior normal. Behavior is cooperative.         Thought Content: Thought content normal.         Cognition and Memory: Cognition and memory normal.         Judgment: Judgment normal.          Assessment:     Problem List Items Addressed This Visit        Pulmonary    Chronic obstructive pulmonary disease (Chronic)    Hypoxia    Relevant Orders    CBC Auto Differential (Completed)    CBC Auto Differential    Pulmonary nodule    Relevant Orders    CBC Auto Differential (Completed)       Renal/    Left breast mass    Relevant Orders    Mammo Digital Screening Bilat       Oncology    Positive colorectal cancer screening using Cologuard test    Relevant Orders    Comprehensive Metabolic Panel (Completed)    Secondary polycythemia - Primary    Relevant Orders    CBC Auto Differential (Completed)    Erythropoietin    Carbon Monoxide, Blood    CBC Auto Differential    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    Relevant Orders    CBC Auto Differential       Endocrine    B12 deficiency    Relevant Orders    CBC Auto Differential (Completed)       Other    Cigarette smoker    Relevant Orders    Carbon Monoxide, Blood    CBC Auto Differential    Nicotine abuse    Relevant Orders    Carbon Monoxide, Blood    CBC Auto Differential      Other Visit Diagnoses     Encounter for screening mammogram for malignant neoplasm of breast         Relevant Orders    Mammo Digital Screening Bilat    Hyperkalemia            Lab Results   Component Value Date    WBC 8.60 06/22/2022    RBC 6.32 (H) 06/22/2022    HGB 18.8 (H) 06/22/2022    HCT 58.5 (H) 06/22/2022    MCV 93 06/22/2022    MCH 29.7 06/22/2022    MCHC 32.1 06/22/2022    RDW 19.5 (H) 06/22/2022     06/22/2022    MPV 10.5 06/22/2022    GRAN 5.7 06/22/2022    GRAN 66.7 06/22/2022    LYMPH 2.1 06/22/2022    LYMPH 23.8 06/22/2022    MONO 0.7 06/22/2022    MONO 7.6 06/22/2022    EOS 0.1 06/22/2022    BASO 0.06 06/22/2022    EOSINOPHIL 1.2 06/22/2022    BASOPHIL 0.7 06/22/2022       BMP  Lab Results   Component Value Date     06/22/2022    K 5.6 (H) 06/22/2022     06/22/2022    CO2 30 (H) 06/22/2022    BUN 21 06/22/2022    CREATININE 0.9 06/22/2022     CALCIUM 9.8 06/22/2022    ANIONGAP 9 06/22/2022    ESTGFRAFRICA >60.0 06/22/2022    EGFRNONAA >60.0 06/22/2022     Lab Results   Component Value Date    ALT 10 06/22/2022    AST 16 06/22/2022    ALKPHOS 92 06/22/2022    BILITOT 0.8 06/22/2022       Plan:   Secondary polycythemia  -     CBC Auto Differential; Future; Expected date: 06/22/2022  -     Erythropoietin; Future; Expected date: 06/22/2022  -     Carbon Monoxide, Blood; Future; Expected date: 06/22/2022  -     CBC Auto Differential; Standing    Pulmonary nodule  -     CBC Auto Differential; Future; Expected date: 06/22/2022    Hypoxia  -     CBC Auto Differential; Future; Expected date: 06/22/2022  -     CBC Auto Differential; Standing    Left breast mass  -     Mammo Digital Screening Bilat; Future; Expected date: 06/22/2022    Positive colorectal cancer screening using Cologuard test  -     Comprehensive Metabolic Panel; Future; Expected date: 06/22/2022    B12 deficiency  -     CBC Auto Differential; Future; Expected date: 06/22/2022    Encounter for screening mammogram for malignant neoplasm of breast   -     Mammo Digital Screening Bilat; Future; Expected date: 06/22/2022    Other emphysema    Nicotine abuse  -     Carbon Monoxide, Blood; Future; Expected date: 06/22/2022  -     CBC Auto Differential; Standing    Cigarette smoker  -     Carbon Monoxide, Blood; Future; Expected date: 06/22/2022  -     CBC Auto Differential; Standing    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  -     CBC Auto Differential; Standing    Hyperkalemia    Other orders  -     LIDOcaine (PF) 10 mg/ml (1%) injection 10 mg      Patient states that she does not want to proceed with colonoscopy.  She is aware that this could be a cancer.  She is agreeable to proceed with mammogram.  She states that she does not want chemotherapy if found with cancer.      Patient is with secondary polycythemia due to hypoxia.  Encouraged to wear oxygen continuously. She states that she uses O2 at 2-1/2  liters.  Advised not to exceed 5 liters O2.  Patient verbalized understanding.  She will f/u with pulmonology.  Recommended smoking cessation and offered initiation of smoking cessation program.  Patient states that she will continue working on this alone for now.        Recommended patient to take ASA 81 mg PO daily.  She states that she was told not to take ASA by Dr. Kelsey, vascular surgeon at [facility] so does not wan to do this.  Recommended stopping oral iron.  Continue B12 supplementation. Patient verbalized understanding.    Labs today cbc, cmp, erythropoietin, carboxyhemoglobin.     Discussed need for phlebotomy for hct >60%.  Patient verbalized understanding.  CBC monthly with RTC in 3 months with cbc.  Phlebotomy if hct >60    Potassium 5.6.  Will have nurse call and have patient avoid potassium rich foods.      Collaborating Provider:  Dr. Laron Crawford    Thank You,  Marcelo Ferguson, FNP-C  Hematology Oncology

## 2022-06-23 ENCOUNTER — TELEPHONE (OUTPATIENT)
Dept: HEMATOLOGY/ONCOLOGY | Facility: CLINIC | Age: 72
End: 2022-06-23
Payer: MEDICARE

## 2022-06-23 ENCOUNTER — TELEPHONE (OUTPATIENT)
Dept: ADMINISTRATIVE | Facility: HOSPITAL | Age: 72
End: 2022-06-23
Payer: MEDICARE

## 2022-06-23 NOTE — TELEPHONE ENCOUNTER
Pt notified. Pt also stated she doesn't take any potassium supplements, nurse informed pt that a message will be sent through the portal based on the food she should avoid with high potassium.

## 2022-06-23 NOTE — PROGRESS NOTES
Please call patient and let her know that her potassium is elevated.  Please have her avoid potassium rich foods. Potatoes, tomatoes, oranges, orange juice, prunes, avocados.  Also make sure that she is not taking potassium supplements.  Thank you,  Marcelo

## 2022-06-23 NOTE — TELEPHONE ENCOUNTER
----- Message from Cate Ferguson NP sent at 6/23/2022 10:41 AM CDT -----  Please call patient and let her know that her potassium is elevated.  Please have her avoid potassium rich foods. Potatoes, tomatoes, oranges, orange juice, prunes, avocados.  Also make sure that she is not taking potassium supplements.  Thank you,  Marcelo

## 2022-06-24 LAB — COHGB MFR BLD: 8 %

## 2022-06-27 LAB — EPO SERPL-ACNC: 5.5 MIU/ML (ref 2.6–18.5)

## 2022-07-29 DIAGNOSIS — Z79.899 ENCOUNTER FOR LONG-TERM (CURRENT) USE OF MEDICATIONS: ICD-10-CM

## 2022-07-29 DIAGNOSIS — E78.5 HYPERLIPIDEMIA, UNSPECIFIED HYPERLIPIDEMIA TYPE: ICD-10-CM

## 2022-07-29 RX ORDER — ATORVASTATIN CALCIUM 40 MG/1
40 TABLET, FILM COATED ORAL DAILY
Qty: 90 TABLET | Refills: 4 | Status: SHIPPED | OUTPATIENT
Start: 2022-07-29 | End: 2023-08-14

## 2022-07-29 NOTE — TELEPHONE ENCOUNTER
No new care gaps identified.  [facility] Embedded Care Gaps. Reference number: [id number]. 7/29/2022   10:27:49 AM DELANOT

## 2022-08-09 ENCOUNTER — PATIENT MESSAGE (OUTPATIENT)
Dept: ADMINISTRATIVE | Facility: HOSPITAL | Age: 72
End: 2022-08-09
Payer: MEDICARE

## 2022-08-15 ENCOUNTER — TELEPHONE (OUTPATIENT)
Dept: FAMILY MEDICINE | Facility: CLINIC | Age: 72
End: 2022-08-15
Payer: MEDICARE

## 2022-08-15 NOTE — TELEPHONE ENCOUNTER
----- Message from Kimberly Garrido sent at 8/15/2022 11:23 AM CDT -----  Contact: Kami  Patient is calling to speak with the nurse, regarding patient currently running a fever, chili's and back aches and test positive for COVID today 08/15/2022. Patient would like some medication called in to the pharmacy [id number] - [place], [place] - [address]   Phone:[phone], Fax: :[phone]. Please give patient a call at [phone] as requested.   Thanks  LR

## 2022-08-18 ENCOUNTER — TELEPHONE (OUTPATIENT)
Dept: FAMILY MEDICINE | Facility: CLINIC | Age: 72
End: 2022-08-18
Payer: MEDICARE

## 2022-08-18 NOTE — TELEPHONE ENCOUNTER
----- Message from Natalia Perez sent at 8/18/2022 12:52 PM CDT -----  Contact: Patient, [phone]  Calling to speak with the nurse regarding treatment for Covid 19. Please call her. Thanks.

## 2022-08-19 ENCOUNTER — OFFICE VISIT (OUTPATIENT)
Dept: FAMILY MEDICINE | Facility: CLINIC | Age: 72
End: 2022-08-19
Payer: MEDICARE

## 2022-08-19 VITALS
HEIGHT: 61 IN | OXYGEN SATURATION: 93 % | WEIGHT: 156 LBS | BODY MASS INDEX: 29.45 KG/M2 | DIASTOLIC BLOOD PRESSURE: 70 MMHG | HEART RATE: 90 BPM | SYSTOLIC BLOOD PRESSURE: 112 MMHG

## 2022-08-19 DIAGNOSIS — R30.0 DYSURIA: Primary | ICD-10-CM

## 2022-08-19 PROBLEM — N30.00 ACUTE CYSTITIS WITHOUT HEMATURIA: Status: ACTIVE | Noted: 2022-08-19

## 2022-08-19 LAB
BILIRUB UR QL STRIP: NEGATIVE
CLARITY UR: CLEAR
COLOR UR: YELLOW
GLUCOSE UR QL STRIP: NEGATIVE
HGB UR QL STRIP: NEGATIVE
KETONES UR QL STRIP: NEGATIVE
LEUKOCYTE ESTERASE UR QL STRIP: NEGATIVE
NITRITE UR QL STRIP: NEGATIVE
PH UR STRIP: 5.5 [PH] (ref 5–8)
PROT UR QL STRIP: ABNORMAL
SP GR UR STRIP: 1.01 (ref 1–1.03)
URN SPEC COLLECT METH UR: ABNORMAL

## 2022-08-19 PROCEDURE — 99213 OFFICE O/P EST LOW 20 MIN: CPT | Mod: S$PBB,,, | Performed by: PHYSICIAN ASSISTANT

## 2022-08-19 PROCEDURE — 81003 URINALYSIS AUTO W/O SCOPE: CPT | Mod: PO | Performed by: PHYSICIAN ASSISTANT

## 2022-08-19 PROCEDURE — 99214 OFFICE O/P EST MOD 30 MIN: CPT | Mod: PBBFAC,PO | Performed by: PHYSICIAN ASSISTANT

## 2022-08-19 PROCEDURE — 99999 PR PBB SHADOW E&M-EST. PATIENT-LVL IV: ICD-10-PCS | Mod: PBBFAC,,, | Performed by: PHYSICIAN ASSISTANT

## 2022-08-19 PROCEDURE — 99999 PR PBB SHADOW E&M-EST. PATIENT-LVL IV: CPT | Mod: PBBFAC,,, | Performed by: PHYSICIAN ASSISTANT

## 2022-08-19 PROCEDURE — 99213 PR OFFICE/OUTPT VISIT, EST, LEVL III, 20-29 MIN: ICD-10-PCS | Mod: S$PBB,,, | Performed by: PHYSICIAN ASSISTANT

## 2022-08-19 RX ORDER — ONDANSETRON 4 MG/1
TABLET, ORALLY DISINTEGRATING ORAL
COMMUNITY
Start: 2022-08-15

## 2022-08-19 RX ORDER — PHENAZOPYRIDINE HYDROCHLORIDE 100 MG/1
100 TABLET, FILM COATED ORAL 3 TIMES DAILY PRN
Qty: 10 TABLET | Refills: 0 | Status: SHIPPED | OUTPATIENT
Start: 2022-08-19 | End: 2022-08-22

## 2022-08-19 NOTE — PROGRESS NOTES
Assessment/Plan:    Problem List Items Addressed This Visit        Renal/    Dysuria - Primary    Overview     -UA negative for infection  -pyridium PRN for pain  -follow up if no improvement or worsening of symptoms            Relevant Medications    phenazopyridine (PYRIDIUM) 100 MG tablet    Other Relevant Orders    Urinalysis, Reflex to Urine Culture Urine, Clean Catch (Completed)          Follow up if symptoms worsen or fail to improve.    Theodora Escudero PA-C  _____________________________________________________________________________________________________________________________________________________    CC: urinary symptoms     HPI: Patient is in clinic today as an established patient here for urinary symptoms. She complains of dysuria, frequency, and urgency. Symptom onset was a few days ago and has worsened since that time. Denies fever, chills, hematuria, nausea, vomiting, or flank pain. She denies history of recurrent UTI or pyelonephritis. No other complaints today.     Past Medical History:   Diagnosis Date    AAA (abdominal aortic aneurysm) without rupture     COPD (chronic obstructive pulmonary disease)     Hyperlipidemia     Hypertension     Pulmonary nodule     Thyroid disease      Past Surgical History:   Procedure Laterality Date    ABDOMINAL AORTIC ANEURYSM REPAIR      cardiac catheterization  2017    HYSTERECTOMY      THYROIDECTOMY      TUBAL LIGATION       Review of patient's allergies indicates:   Allergen Reactions    Aspirin, buffered     Codeine     Penicillins Hives    Singulair [montelukast]      Social History     Tobacco Use    Smoking status: Current Some Day Smoker     Packs/day: 0.50    Smokeless tobacco: Never Used   Substance Use Topics    Alcohol use: Never    Drug use: Never     Family History   Problem Relation Age of Onset    Colon cancer Neg Hx     Stomach cancer Neg Hx     Esophageal cancer Neg Hx      Current Outpatient Medications on File  Prior to Visit   Medication Sig Dispense Refill    albuterol (VENTOLIN HFA) 90 mcg/actuation inhaler INHALE ONE OR TWO PUFFS BY MOUTH EVERY FOUR TO SIX HOURS AS NEEDED FOR WHEEZING 18 g 12    albuterol-ipratropium (DUO-NEB) 2.5 mg-0.5 mg/3 mL nebulizer solution Take 3 mLs by nebulization every 4 (four) hours as needed for Wheezing or Shortness of Breath. 1 each 11    amlodipine-benazepril 10-20mg (LOTREL) 10-20 mg per capsule Take 1 capsule by mouth once daily. 90 capsule 4    ANORO ELLIPTA 62.5-25 mcg/actuation DsDv INHALE ONE PUFF BY MOUTH DAILY 60 each 11    atorvastatin (LIPITOR) 40 MG tablet Take 1 tablet (40 mg total) by mouth once daily. 90 tablet 4    b complex vitamins capsule Take 1 capsule by mouth once daily.      levothyroxine (SYNTHROID) 50 MCG tablet TAKE ONE TABLET BY MOUTH EVERY MORNING WITH BREAKFAST. 90 tablet 4    metFORMIN (GLUCOPHAGE) 1000 MG tablet Take 1 tablet (1,000 mg total) by mouth 2 (two) times daily with meals. 180 tablet 4    multivitamin capsule Take 1 capsule by mouth once daily.      ofloxacin (OCUFLOX) 0.3 % ophthalmic solution Apply 5 drops into each ear twice daily for 7 days 1 each 0    ondansetron (ZOFRAN-ODT) 4 MG TbDL Take by mouth.      calcium carbonate (OS-FREDY) 500 mg calcium (1,250 mg) tablet Take 1 tablet (500 mg total) by mouth once daily. 360 tablet 0    calcium-vitamin D3 (OS-FREDY 500 + D3) 500 mg(1,250mg) -200 unit per tablet TAKE ONE TABLET BY MOUTH DAILY  360 tablet 11    cetirizine (ZYRTEC) 10 MG tablet Take 1 tablet (10 mg total) by mouth once daily. for 10 days 30 tablet 11     No current facility-administered medications on file prior to visit.       Review of Systems   Constitutional: Negative for chills, diaphoresis, fatigue and fever.   HENT: Negative for congestion, ear pain, postnasal drip, sinus pain and sore throat.    Eyes: Negative for pain and redness.   Respiratory: Negative for cough, chest tightness and shortness of breath.   "  Cardiovascular: Negative for chest pain and leg swelling.   Gastrointestinal: Negative for abdominal pain, constipation, diarrhea, nausea and vomiting.   Genitourinary: Positive for dysuria, frequency and urgency. Negative for hematuria.   Musculoskeletal: Negative for arthralgias and joint swelling.   Skin: Negative for rash.   Neurological: Negative for dizziness, syncope and headaches.       Vitals:    08/19/22 1055   BP: 112/70   BP Location: Left arm   Pulse: 90   SpO2: (!) 93%   Weight: 70.8 kg (156 lb)   Height: 5' 1" (1.549 m)       Wt Readings from Last 3 Encounters:   08/19/22 70.8 kg (156 lb)   06/22/22 73.8 kg (162 lb 11.2 oz)   05/19/22 76.6 kg (168 lb 12.8 oz)       Physical Exam  Constitutional:       General: She is not in acute distress.     Appearance: Normal appearance. She is well-developed.   HENT:      Head: Normocephalic and atraumatic.   Eyes:      Conjunctiva/sclera: Conjunctivae normal.   Cardiovascular:      Rate and Rhythm: Normal rate and regular rhythm.      Pulses: Normal pulses.      Heart sounds: Normal heart sounds. No murmur heard.  Pulmonary:      Effort: Pulmonary effort is normal. No respiratory distress.      Breath sounds: Normal breath sounds.   Abdominal:      General: Bowel sounds are normal. There is no distension.      Palpations: Abdomen is soft.      Tenderness: There is no abdominal tenderness. There is no right CVA tenderness or left CVA tenderness.   Musculoskeletal:         General: Normal range of motion.      Cervical back: Normal range of motion and neck supple.   Skin:     General: Skin is warm and dry.      Findings: No rash.   Neurological:      General: No focal deficit present.      Mental Status: She is alert and oriented to person, place, and time.         Health Maintenance   Topic Date Due    Foot Exam  Never done    Eye Exam  Never done    Mammogram  07/30/2020    DEXA Scan  07/30/2022    Hemoglobin A1c  11/19/2022    Lipid Panel  05/19/2023    " Low Dose Statin  08/19/2023    TETANUS VACCINE  05/27/2028    Hepatitis C Screening  Completed

## 2022-08-19 NOTE — PROGRESS NOTES
Results have been released via [employer]. Please verify that these have been viewed by patient. If not, please call patient with results.     I have sent a message to them with the following interpretation (see below).    I have reviewed your recent urine sample which was negative for infection. I sent over a prescription of pyridium to your pharmacy to help with your symptoms. Please let me know if symptoms worsen or do not improve.     Please do not hesitate to call or message with any additional questions or concerns.    Theodora Escudero PA-C

## 2022-08-23 ENCOUNTER — TELEPHONE (OUTPATIENT)
Dept: FAMILY MEDICINE | Facility: CLINIC | Age: 72
End: 2022-08-23
Payer: MEDICARE

## 2022-08-23 NOTE — TELEPHONE ENCOUNTER
----- Message from Shanelle Rodriguez LPN sent at 8/23/2022  3:31 PM CDT -----  Contact: [phone]    ----- Message -----  From: Liliane Golden  Sent: 8/23/2022   3:28 PM CDT  To: Maryan Garcia Staff    Type:  Patient Returning Call    Who Called:Kami   Who Left Message for Patient:nurse   Does the patient know what this is regarding?: yes   Would the patient rather a call back or a response via [facility]? Call back   Best Call Back Number:[phone]  Additional Information:

## 2022-08-24 ENCOUNTER — PATIENT MESSAGE (OUTPATIENT)
Dept: ADMINISTRATIVE | Facility: HOSPITAL | Age: 72
End: 2022-08-24
Payer: MEDICARE

## 2022-09-07 DIAGNOSIS — Z78.0 MENOPAUSE: ICD-10-CM

## 2022-09-08 ENCOUNTER — OFFICE VISIT (OUTPATIENT)
Dept: FAMILY MEDICINE | Facility: CLINIC | Age: 72
End: 2022-09-08
Payer: MEDICARE

## 2022-09-08 ENCOUNTER — TELEPHONE (OUTPATIENT)
Dept: FAMILY MEDICINE | Facility: CLINIC | Age: 72
End: 2022-09-08
Payer: MEDICARE

## 2022-09-08 DIAGNOSIS — R10.31 RIGHT LOWER QUADRANT ABDOMINAL PAIN: Primary | ICD-10-CM

## 2022-09-08 PROCEDURE — 99442 PR PHYSICIAN TELEPHONE EVALUATION 11-20 MIN: ICD-10-PCS | Mod: 95,,, | Performed by: FAMILY MEDICINE

## 2022-09-08 PROCEDURE — 99442 PR PHYSICIAN TELEPHONE EVALUATION 11-20 MIN: CPT | Mod: 95,,, | Performed by: FAMILY MEDICINE

## 2022-09-08 RX ORDER — SULFAMETHOXAZOLE AND TRIMETHOPRIM 800; 160 MG/1; MG/1
1 TABLET ORAL 2 TIMES DAILY
Qty: 14 TABLET | Refills: 0 | Status: SHIPPED | OUTPATIENT
Start: 2022-09-08 | End: 2022-09-12

## 2022-09-08 NOTE — TELEPHONE ENCOUNTER
----- Message from Annalise Reddy sent at 9/8/2022 11:50 AM CDT -----  Regarding: pt advice  Contact: pt  Pt is calling to speak to nurse regarding the pain on rt side. Please call back at [phone]//thank you acc

## 2022-09-08 NOTE — PROGRESS NOTES
The patient location is: Home  The chief complaint leading to consultation is:Rt flank pain  Visit type: Virtual visit with synchronous audio   Total time spent with patient: 15 minutes  Each patient to whom he or she provides medical services by telemedicine is:  (1) informed of the relationship between the physician and patient and the respective role of any other health care provider with respect to management of the patient; and (2) notified that he or she may decline to receive medical services by telemedicine and may withdraw from such care at any time.    Notes:   Kami Higgins presents with moderate lower abd/flank pain p 3 days. Had dysuria 10 d ago but UA neg and symptoms resolved No upper respiratory congestion,rhinnorhea, cough   No dyspnea Denies nausea,vomiting,diarrhea or fever.    Past Medical History:   Diagnosis Date    AAA (abdominal aortic aneurysm) without rupture     COPD (chronic obstructive pulmonary disease)     Hyperlipidemia     Hypertension     Pulmonary nodule     Thyroid disease      Past Surgical History:   Procedure Laterality Date    ABDOMINAL AORTIC ANEURYSM REPAIR      cardiac catheterization  2017    HYSTERECTOMY      THYROIDECTOMY      TUBAL LIGATION       Review of patient's allergies indicates:   Allergen Reactions    Aspirin, buffered     Codeine     Penicillins Hives    Singulair [montelukast]      Current Outpatient Medications on File Prior to Visit   Medication Sig Dispense Refill    albuterol (VENTOLIN HFA) 90 mcg/actuation inhaler INHALE ONE OR TWO PUFFS BY MOUTH EVERY FOUR TO SIX HOURS AS NEEDED FOR WHEEZING 18 g 12    albuterol-ipratropium (DUO-NEB) 2.5 mg-0.5 mg/3 mL nebulizer solution Take 3 mLs by nebulization every 4 (four) hours as needed for Wheezing or Shortness of Breath. 1 each 11    amlodipine-benazepril 10-20mg (LOTREL) 10-20 mg per capsule Take 1 capsule by mouth once daily. 90 capsule 4    ANORO ELLIPTA 62.5-25 mcg/actuation DsDv INHALE ONE PUFF BY MOUTH  DAILY 60 each 11    atorvastatin (LIPITOR) 40 MG tablet Take 1 tablet (40 mg total) by mouth once daily. 90 tablet 4    b complex vitamins capsule Take 1 capsule by mouth once daily.      calcium carbonate (OS-FREDY) 500 mg calcium (1,250 mg) tablet Take 1 tablet (500 mg total) by mouth once daily. 360 tablet 0    calcium-vitamin D3 (OS-FREDY 500 + D3) 500 mg(1,250mg) -200 unit per tablet TAKE ONE TABLET BY MOUTH DAILY  360 tablet 11    cetirizine (ZYRTEC) 10 MG tablet Take 1 tablet (10 mg total) by mouth once daily. for 10 days 30 tablet 11    levothyroxine (SYNTHROID) 50 MCG tablet TAKE ONE TABLET BY MOUTH EVERY MORNING WITH BREAKFAST. 90 tablet 4    metFORMIN (GLUCOPHAGE) 1000 MG tablet Take 1 tablet (1,000 mg total) by mouth 2 (two) times daily with meals. 180 tablet 4    multivitamin capsule Take 1 capsule by mouth once daily.      ofloxacin (OCUFLOX) 0.3 % ophthalmic solution Apply 5 drops into each ear twice daily for 7 days 1 each 0    ondansetron (ZOFRAN-ODT) 4 MG TbDL Take by mouth.       No current facility-administered medications on file prior to visit.     Social History     Socioeconomic History    Marital status:    Tobacco Use    Smoking status: Some Days     Packs/day: 0.50     Types: Cigarettes    Smokeless tobacco: Never   Substance and Sexual Activity    Alcohol use: Never    Drug use: Never    Sexual activity: Not Currently     Partners: Male     Family History   Problem Relation Age of Onset    Colon cancer Neg Hx     Stomach cancer Neg Hx     Esophageal cancer Neg Hx          ROS:  SKIN: No rashes, itching or changes in color or texture of skin.  EYES: Visual acuity fine. No photophobia, ocular pain or diplopia.EARS: Denies ear pain, discharge or vertigo.NOSE: No loss of smell, no epistaxis some postnasal drip.MOUTH & THROAT: No hoarseness or change in voice. No excessive gum bleeding.CHEST: Denies PATEL, cyanosis, wheezing  CARDIOVASCULAR: Denies chest pain, PND, orthopnea or reduced  exercise tolerance.  ABDOMEN:  No weight loss.No abdominal pain, no hematemesis or blood in stool.  URINARY: No flank pain, dysuria or hematuria.  PERIPHERAL VASCULAR: No claudication or cyanosis.  MUSCULOSKELETAL: Negative   NEUROLOGIC: No history of seizures, paralysis, alteration of gait or coordination.    PE: Heent:Normocephalic with no recent cranial trauma,PERRLA,EOMI,conjunctiva clear,Nasal mucosa clear  Chest:No tachypnea. No wheezing, rhonchi on forced expiration  Abdomen:Soft, min tender to patient palpation  Impression: Abdominal pain  Plan: Dif dx includes diverticulitis,partially tx UTI,appendicitis medullary necrosis ureterolithiasis. Presentation is subacute w no associated NVD dysuria or fever  Rec bactrim ds, bland progressive diet,force fluids and careful symptomatic fu. If pain worsens or any new symptoms develop she is to go immediately to ER for further evaluation

## 2022-09-09 ENCOUNTER — HOSPITAL ENCOUNTER (EMERGENCY)
Facility: HOSPITAL | Age: 72
Discharge: HOME | End: 2022-09-09
Attending: EMERGENCY MEDICINE
Payer: MEDICARE

## 2022-09-09 VITALS
WEIGHT: 151.38 LBS | BODY MASS INDEX: 28.6 KG/M2 | HEART RATE: 89 BPM | DIASTOLIC BLOOD PRESSURE: 63 MMHG | OXYGEN SATURATION: 91 % | RESPIRATION RATE: 24 BRPM | SYSTOLIC BLOOD PRESSURE: 107 MMHG | TEMPERATURE: 98 F

## 2022-09-09 DIAGNOSIS — K29.70 GASTRITIS, PRESENCE OF BLEEDING UNSPECIFIED, UNSPECIFIED CHRONICITY, UNSPECIFIED GASTRITIS TYPE: Primary | ICD-10-CM

## 2022-09-09 DIAGNOSIS — K59.00 CONSTIPATION, UNSPECIFIED CONSTIPATION TYPE: ICD-10-CM

## 2022-09-09 DIAGNOSIS — K57.90 DIVERTICULOSIS: ICD-10-CM

## 2022-09-09 LAB
ALBUMIN SERPL BCP-MCNC: 3.3 G/DL (ref 3.5–5.2)
ALP SERPL-CCNC: 84 U/L (ref 55–135)
ALT SERPL W/O P-5'-P-CCNC: 9 U/L (ref 10–44)
ANION GAP SERPL CALC-SCNC: 8 MMOL/L (ref 8–16)
AST SERPL-CCNC: 14 U/L (ref 10–40)
BACTERIA #/AREA URNS HPF: NORMAL /HPF
BASOPHILS # BLD AUTO: 0.07 K/UL (ref 0–0.2)
BASOPHILS NFR BLD: 0.8 % (ref 0–1.9)
BILIRUB SERPL-MCNC: 0.4 MG/DL (ref 0.1–1)
BILIRUB UR QL STRIP: NEGATIVE
BUN SERPL-MCNC: 16 MG/DL (ref 8–23)
CALCIUM SERPL-MCNC: 9 MG/DL (ref 8.7–10.5)
CHLORIDE SERPL-SCNC: 102 MMOL/L (ref 95–110)
CLARITY UR: CLEAR
CO2 SERPL-SCNC: 25 MMOL/L (ref 23–29)
COLOR UR: YELLOW
CREAT SERPL-MCNC: 0.8 MG/DL (ref 0.5–1.4)
DIFFERENTIAL METHOD: ABNORMAL
EOSINOPHIL # BLD AUTO: 0.1 K/UL (ref 0–0.5)
EOSINOPHIL NFR BLD: 1 % (ref 0–8)
ERYTHROCYTE [DISTWIDTH] IN BLOOD BY AUTOMATED COUNT: 18.5 % (ref 11.5–14.5)
EST. GFR  (NO RACE VARIABLE): >60 ML/MIN/1.73 M^2
GLUCOSE SERPL-MCNC: 82 MG/DL (ref 70–110)
GLUCOSE UR QL STRIP: NEGATIVE
HCT VFR BLD AUTO: 46.7 % (ref 37–48.5)
HGB BLD-MCNC: 15.5 G/DL (ref 12–16)
HGB UR QL STRIP: NEGATIVE
HYALINE CASTS #/AREA URNS LPF: 1 /LPF
IMM GRANULOCYTES # BLD AUTO: 0.04 K/UL (ref 0–0.04)
IMM GRANULOCYTES NFR BLD AUTO: 0.4 % (ref 0–0.5)
KETONES UR QL STRIP: NEGATIVE
LEUKOCYTE ESTERASE UR QL STRIP: ABNORMAL
LIPASE SERPL-CCNC: 32 U/L (ref 4–60)
LYMPHOCYTES # BLD AUTO: 1.8 K/UL (ref 1–4.8)
LYMPHOCYTES NFR BLD: 19.7 % (ref 18–48)
MCH RBC QN AUTO: 30.5 PG (ref 27–31)
MCHC RBC AUTO-ENTMCNC: 33.2 G/DL (ref 32–36)
MCV RBC AUTO: 92 FL (ref 82–98)
MICROSCOPIC COMMENT: NORMAL
MONOCYTES # BLD AUTO: 0.6 K/UL (ref 0.3–1)
MONOCYTES NFR BLD: 7.2 % (ref 4–15)
NEUTROPHILS # BLD AUTO: 6.3 K/UL (ref 1.8–7.7)
NEUTROPHILS NFR BLD: 70.9 % (ref 38–73)
NITRITE UR QL STRIP: NEGATIVE
NRBC BLD-RTO: 0 /100 WBC
PH UR STRIP: 6 [PH] (ref 5–8)
PLATELET # BLD AUTO: 242 K/UL (ref 150–450)
PMV BLD AUTO: 9.3 FL (ref 9.2–12.9)
POTASSIUM SERPL-SCNC: 4.6 MMOL/L (ref 3.5–5.1)
PROT SERPL-MCNC: 7 G/DL (ref 6–8.4)
PROT UR QL STRIP: ABNORMAL
RBC # BLD AUTO: 5.09 M/UL (ref 4–5.4)
RBC #/AREA URNS HPF: 0 /HPF (ref 0–4)
SODIUM SERPL-SCNC: 135 MMOL/L (ref 136–145)
SP GR UR STRIP: 1.02 (ref 1–1.03)
URN SPEC COLLECT METH UR: ABNORMAL
UROBILINOGEN UR STRIP-ACNC: ABNORMAL EU/DL
WBC # BLD AUTO: 8.89 K/UL (ref 3.9–12.7)
WBC #/AREA URNS HPF: 1 /HPF (ref 0–5)

## 2022-09-09 PROCEDURE — 63600175 PHARM REV CODE 636 W HCPCS: Performed by: NURSE PRACTITIONER

## 2022-09-09 PROCEDURE — 99285 EMERGENCY DEPT VISIT HI MDM: CPT | Mod: 25

## 2022-09-09 PROCEDURE — 83690 ASSAY OF LIPASE: CPT | Performed by: NURSE PRACTITIONER

## 2022-09-09 PROCEDURE — 80053 COMPREHEN METABOLIC PANEL: CPT | Performed by: NURSE PRACTITIONER

## 2022-09-09 PROCEDURE — 96374 THER/PROPH/DIAG INJ IV PUSH: CPT

## 2022-09-09 PROCEDURE — 85025 COMPLETE CBC W/AUTO DIFF WBC: CPT | Performed by: NURSE PRACTITIONER

## 2022-09-09 PROCEDURE — 81000 URINALYSIS NONAUTO W/SCOPE: CPT | Performed by: NURSE PRACTITIONER

## 2022-09-09 RX ORDER — ONDANSETRON 2 MG/ML
4 INJECTION INTRAMUSCULAR; INTRAVENOUS
Status: COMPLETED | OUTPATIENT
Start: 2022-09-09 | End: 2022-09-09

## 2022-09-09 RX ORDER — OMEPRAZOLE 20 MG/1
20 CAPSULE, DELAYED RELEASE ORAL DAILY
Qty: 30 CAPSULE | Refills: 0 | Status: SHIPPED | OUTPATIENT
Start: 2022-09-09 | End: 2024-01-26

## 2022-09-09 RX ADMIN — ONDANSETRON 4 MG: 2 INJECTION INTRAMUSCULAR; INTRAVENOUS at 04:09

## 2022-09-09 NOTE — ED PROVIDER NOTES
SCRIBE #1 NOTE: I, Patrickdarryl Mcneill, am scribing for, and in the presence of, Bebo Duenas Jr., MD. I have scribed the entire note.       History     Chief Complaint   Patient presents with    Flank Pain     Right flank pain with nausea, decreased PO intake; denies new urinary symptoms     Review of patient's allergies indicates:   Allergen Reactions    Aspirin, buffered     Codeine     Penicillins Hives    Singulair [montelukast]          History of Present Illness     HPI    9/9/2022, 3:34 PM  History obtained from the patient      History of Present Illness: Kami Higgins is a 71 y.o. female patient with a PMHx of HTN, HLD, COPD, AAA without rupture who presents to the Emergency Department for evaluation of right flank pain x 1 week. Symptoms are constant and moderate in severity. No mitigating or exacerbating factors reported. Associated sxs include decreased PO intake. Patient denies any dysuria, hematuria, n/v/d, abd pain, fever, and all other sxs at this time. No prior tx reported. No further complaints or concerns at this time.       Arrival mode: Personal vehicle    PCP: Jose Steinberg MD        Past Medical History:  Past Medical History:   Diagnosis Date    AAA (abdominal aortic aneurysm) without rupture     COPD (chronic obstructive pulmonary disease)     Hyperlipidemia     Hypertension     Pulmonary nodule     Thyroid disease        Past Surgical History:  Past Surgical History:   Procedure Laterality Date    ABDOMINAL AORTIC ANEURYSM REPAIR      cardiac catheterization  2017    HYSTERECTOMY      THYROIDECTOMY      TUBAL LIGATION           Family History:  Family History   Problem Relation Age of Onset    Colon cancer Neg Hx     Stomach cancer Neg Hx     Esophageal cancer Neg Hx        Social History:  Social History     Tobacco Use    Smoking status: Some Days     Packs/day: 0.50     Types: Cigarettes    Smokeless tobacco: Never   Substance and Sexual Activity    Alcohol use: Never    Drug use: Never     Sexual activity: Not Currently     Partners: Male        Review of Systems     Review of Systems   Constitutional:  Negative for fever.   HENT:  Negative for sore throat.    Respiratory:  Negative for shortness of breath.    Cardiovascular:  Negative for chest pain.   Gastrointestinal:  Negative for abdominal pain, diarrhea, nausea and vomiting.   Genitourinary:  Positive for flank pain. Negative for dysuria.   Musculoskeletal:  Negative for back pain.   Skin:  Negative for rash.   Neurological:  Negative for weakness.   Hematological:  Does not bruise/bleed easily.   All other systems reviewed and are negative.   Physical Exam     Initial Vitals [09/09/22 1402]   BP Pulse Resp Temp SpO2   109/63 94 20 98.1 °F (36.7 °C) (!) 87 %      MAP       --          Physical Exam  Nursing Notes and Vital Signs Reviewed.  Constitutional: Patient is in no acute distress. Well-developed and well-nourished.  Head: Atraumatic. Normocephalic.  Eyes: PERRL. EOM intact. Conjunctivae are not pale. No scleral icterus.  ENT: Mucous membranes are moist. Oropharynx is clear and symmetric.    Neck: Supple. Full ROM. No lymphadenopathy.  Cardiovascular: Regular rate. Regular rhythm. No murmurs, rubs, or gallops. Distal pulses are 2+ and symmetric.  Pulmonary/Chest: No respiratory distress. Clear to auscultation bilaterally. No wheezing or rales.  Abdominal: Soft and non-distended. Right flank tenderness to percussion  No rebound, guarding, or rigidity. Good bowel sounds.  Genitourinary: No CVA tenderness  Musculoskeletal: Moves all extremities. No obvious deformities. No edema. No calf tenderness.  Skin: Warm and dry.  Neurological:  Alert, awake, and appropriate.  Normal speech.  No acute focal neurological deficits are appreciated.  Psychiatric: Normal affect. Good eye contact. Appropriate in content.     ED Course   Procedures  ED Vital Signs:  Vitals:    09/09/22 1402 09/09/22 1532 09/09/22 1632 09/09/22 1732   BP: 109/63 128/75 134/63  107/63   Pulse: 94 80 80 89   Resp: 20 (!) 24 (!) 24 (!) 24   Temp: 98.1 °F (36.7 °C)      TempSrc: Oral      SpO2: (!) 87% (!) 92% (!) 91% (!) 91%   Weight: 151 lb 5.5 oz (68.6 kg)          Abnormal Lab Results:  Labs Reviewed   CBC W/ AUTO DIFFERENTIAL - Abnormal; Notable for the following components:       Result Value    RDW 18.5 (*)     All other components within normal limits   COMPREHENSIVE METABOLIC PANEL - Abnormal; Notable for the following components:    Sodium 135 (*)     Albumin 3.3 (*)     ALT 9 (*)     All other components within normal limits   URINALYSIS, REFLEX TO URINE CULTURE - Abnormal; Notable for the following components:    Protein, UA 1+ (*)     Urobilinogen, UA 2.0-3.0 (*)     Leukocytes, UA Trace (*)     All other components within normal limits    Narrative:     Specimen Source->Urine   LIPASE   URINALYSIS MICROSCOPIC    Narrative:     Specimen Source->Urine        All Lab Results:  Results for orders placed or performed during the hospital encounter of 09/09/22   CBC auto differential   Result Value Ref Range    WBC 8.89 3.90 - 12.70 K/uL    RBC 5.09 4.00 - 5.40 M/uL    Hemoglobin 15.5 12.0 - 16.0 g/dL    Hematocrit 46.7 37.0 - 48.5 %    MCV 92 82 - 98 fL    MCH 30.5 27.0 - 31.0 pg    MCHC 33.2 32.0 - 36.0 g/dL    RDW 18.5 (H) 11.5 - 14.5 %    Platelets 242 150 - 450 K/uL    MPV 9.3 9.2 - 12.9 fL    Immature Granulocytes 0.4 0.0 - 0.5 %    Gran # (ANC) 6.3 1.8 - 7.7 K/uL    Immature Grans (Abs) 0.04 0.00 - 0.04 K/uL    Lymph # 1.8 1.0 - 4.8 K/uL    Mono # 0.6 0.3 - 1.0 K/uL    Eos # 0.1 0.0 - 0.5 K/uL    Baso # 0.07 0.00 - 0.20 K/uL    nRBC 0 0 /100 WBC    Gran % 70.9 38.0 - 73.0 %    Lymph % 19.7 18.0 - 48.0 %    Mono % 7.2 4.0 - 15.0 %    Eosinophil % 1.0 0.0 - 8.0 %    Basophil % 0.8 0.0 - 1.9 %    Differential Method Automated    Comprehensive metabolic panel   Result Value Ref Range    Sodium 135 (L) 136 - 145 mmol/L    Potassium 4.6 3.5 - 5.1 mmol/L    Chloride 102 95 - 110 mmol/L     CO2 25 23 - 29 mmol/L    Glucose 82 70 - 110 mg/dL    BUN 16 8 - 23 mg/dL    Creatinine 0.8 0.5 - 1.4 mg/dL    Calcium 9.0 8.7 - 10.5 mg/dL    Total Protein 7.0 6.0 - 8.4 g/dL    Albumin 3.3 (L) 3.5 - 5.2 g/dL    Total Bilirubin 0.4 0.1 - 1.0 mg/dL    Alkaline Phosphatase 84 55 - 135 U/L    AST 14 10 - 40 U/L    ALT 9 (L) 10 - 44 U/L    Anion Gap 8 8 - 16 mmol/L    eGFR >60 >60 mL/min/1.73 m^2   Lipase   Result Value Ref Range    Lipase 32 4 - 60 U/L   Urinalysis, Reflex to Urine Culture Urine, Clean Catch    Specimen: Urine   Result Value Ref Range    Specimen UA Urine, Clean Catch     Color, UA Yellow Yellow, Straw, Jennifer    Appearance, UA Clear Clear    pH, UA 6.0 5.0 - 8.0    Specific Gravity, UA 1.025 1.005 - 1.030    Protein, UA 1+ (A) Negative    Glucose, UA Negative Negative    Ketones, UA Negative Negative    Bilirubin (UA) Negative Negative    Occult Blood UA Negative Negative    Nitrite, UA Negative Negative    Urobilinogen, UA 2.0-3.0 (A) <2.0 EU/dL    Leukocytes, UA Trace (A) Negative   Urinalysis Microscopic   Result Value Ref Range    RBC, UA 0 0 - 4 /hpf    WBC, UA 1 0 - 5 /hpf    Bacteria None None-Occ /hpf    Hyaline Casts, UA 1 0-1/lpf /lpf    Microscopic Comment SEE COMMENT        Imaging Results:  Imaging Results              CT Renal Stone Study ABD Pelvis WO (Final result)  Result time 09/09/22 14:56:47      Final result by Sriram Reyes MD (09/09/22 14:56:47)                   Impression:      No evidence of obstructive uropathy.    See findings above.  Evaluation of solid organ and vascular pathology is limited due to lack of IV contrast.    All CT scans at this facility use dose modulation, iterative reconstruction, and/or weight based dosing when appropriate to reduce radiation dose to as low as reasonable achievable.      Electronically signed by: Sriram Reyes MD  Date:    09/09/2022  Time:    14:56               Narrative:    EXAMINATION:  CT RENAL STONE STUDY ABD PELVIS  WO    CLINICAL HISTORY:  Flank pain, kidney stone suspected;    TECHNIQUE:  Low dose axial images, sagittal and coronal reformations were obtained from the lung bases to the pubic symphysis.  Oral contrast was not administered.    COMPARISON:  None    FINDINGS:  Heart: Normal size. No effusion.  Aortic valve calcifications.    Lung Bases: Clear.  Scarring within the right middle lobe.    Liver: Normal size and attenuation. No focal lesions.    Gallbladder: Not seen    Bile Ducts: No dilatation.    Pancreas: No obvious mass. No peripancreatic fat stranding.    Spleen: Normal.    Adrenals: Normal.    Kidneys/Ureters: Bilateral renal cortical thinning.  No definite mass.  No mass, hydroureteronephrosis, or nephroureterolithiasis.    Bladder: No wall thickening.    Reproductive organs: Normal.    GI Tract/Mesentery: Stomach is largely collapsed.  Mild rugal fold thickening within the body of the stomach could reflect gastritis.  No evidence of bowel obstruction or inflammation.  Extensive diverticulosis seen throughout the large bowel greatest within the sigmoid colon.  Moderate constipation.  Appendix is not seen.  Shotty adenopathy.    Peritoneal Space: No ascites or free air.    Retroperitoneum: Shotty adenopathy.    Abdominal wall: Normal.    Vasculature: Aneurysm of the distal thoracic aorta measuring up to 4.2 cm.  Descending thoracic aorta is ectatic measuring 3.2 cm.  Aortic endograft noted traversing and abdominal aortic aneurysm measuring 5.8 x 6.1 cm.  No comparison is available.  Calcifications are suspected within the aneurysm sac.  Evaluation for endoleak not possible without contrast.    Bones: No acute fracture. No suspicious lytic or sclerotic lesions.                                              The Emergency Provider reviewed the vital signs and test results, which are outlined above.     ED Discussion     5:54 PM: Reassessed pt at this time. Discussed with pt all pertinent ED information and results.  Discussed pt dx and plan of tx. Gave pt all f/u and return to the ED instructions. All questions and concerns were addressed at this time. Pt expresses understanding of information and instructions, and is comfortable with plan to discharge. Pt is stable for discharge.    Regarding CONSTIPATION, I informed patient of common causes of constipation (low-fiber diet, lack of physical activity, decreased fluid intake, and delays in going to the bathroom when urge is present) and ways to prevent it (eat lots of fiber, drink plenty of fluids daily, exercise regularly, and go to the bathroom when you urge presents.  For treatment, advised patient to use OTC medications:  stool softeners (docusate sodium), bulk laxatives (psyllium) to help add fluid and bulk to the stool, suppositories or gentle laxatives (milk of magnesia liquid), and to reserve enemas or stimulant laxatives for severe cases only. Reiterated the importance of following up with primary care provider for management of their constipation.    I discussed with patient and/or family/caretaker that evaluation in the ED does not suggest any emergent or life threatening medical conditions requiring immediate intervention beyond what was provided in the ED, and I believe patient is safe for discharge.  Regardless, an unremarkable evaluation in the ED does not preclude the development or presence of a serious of life threatening condition. As such, patient was instructed to return immediately for any worsening or change in current symptoms.       Medical Decision Making:   Clinical Tests:   Lab Tests: Ordered and Reviewed  Radiological Study: Ordered and Reviewed         ED Medication(s):  Medications   ondansetron injection 4 mg (4 mg Intravenous Given 9/9/22 2610)       Discharge Medication List as of 9/9/2022  5:57 PM        START taking these medications    Details   omeprazole (PRILOSEC) 20 MG capsule Take 1 capsule (20 mg total) by mouth once daily., Starting  Fri 9/9/2022, Until Sun 10/9/2022, Print              Follow-up Information       Jose Steinberg MD. Schedule an appointment as soon as possible for a visit in 1 week.    Specialty: Family Medicine  Contact information:  [address] [address]  [phone]               [facility]    Specialty: Emergency Medicine  Why: As needed, If symptoms worsen  Contact information:  [address]  [facility] [address] [phone]                               Scribe Attestation:   Scribe #1: I performed the above scribed service and the documentation accurately describes the services I performed. I attest to the accuracy of the note.     Attending:   Physician Attestation Statement for Scribe #1: I, Bebo Duenas Jr., MD, personally performed the services described in this documentation, as scribed by Patrick Mcneill, in my presence, and it is both accurate and complete.           Clinical Impression       ICD-10-CM ICD-9-CM   1. Gastritis, presence of bleeding unspecified, unspecified chronicity, unspecified gastritis type  K29.70 535.50   2. Constipation, unspecified constipation type  K59.00 564.00   3. Diverticulosis  K57.90 562.10       Disposition:   Disposition: Discharged  Condition: Stable       Bebo Duenas Jr., MD  09/20/22 0018

## 2022-09-09 NOTE — FIRST PROVIDER EVALUATION
Medical screening examination initiated.  I have conducted a focused provider triage encounter, findings are as follows:    Brief history of present illness:  right sided flank pain with n/v    Vitals:    09/09/22 1402   BP: 109/63   BP Location: Right arm   Patient Position: Sitting   Pulse: 94   Resp: 20   Temp: 98.1 °F (36.7 °C)   TempSrc: Oral   SpO2: (!) 87%  Comment: uses portable 02 PRN   Weight: 68.6 kg (151 lb 5.5 oz)       Pertinent physical exam:  nad    Brief workup plan:  labs, meds, imaging     Preliminary workup initiated; this workup will be continued and followed by the physician or advanced practice provider that is assigned to the patient when roomed.

## 2022-09-09 NOTE — DISCHARGE INSTRUCTIONS
Regarding CONSTIPATION, I informed patient of common causes of constipation (low-fiber diet, lack of physical activity, decreased fluid intake, and delays in going to the bathroom when urge is present) and ways to prevent it (eat lots of fiber, drink plenty of fluids daily, exercise regularly, and go to the bathroom when you urge presents.  For treatment, advised patient to use OTC medications:  stool softeners (docusate sodium), bulk laxatives (psyllium) to help add fluid and bulk to the stool, suppositories or gentle laxatives (milk of magnesia liquid), and to reserve enemas or stimulant laxatives for severe cases only. Reiterated the importance of following up with primary care provider for management of their constipation.    Regarding ABDOMINAL PAIN, I recommended that the patient: Sip water or other clear fluids; avoid solid food for the first few hours after vomiting or diarrhea; if vomiting, wait 6 hours, and then eat small amounts of mild foods such as rice, applesauce, or crackers; avoid dairy products; avoid citrus, high-fat foods, fried or greasy foods, tomato products, caffeine, alcohol, and carbonated beverages;  avoid aspirin, ibuprofen or other anti-inflammatory medications, and narcotic pain medications unless prescribed.  In regards to prevention, I encouraged patient to:  Avoid fatty or greasy foods; drink plenty of water each day; eat small meals more frequently; exercise regularly; limit foods that produce gas; make sure meals are well-balanced and high in fiber and include plenty of fruits and vegetables.

## 2022-09-12 ENCOUNTER — OFFICE VISIT (OUTPATIENT)
Dept: FAMILY MEDICINE | Facility: CLINIC | Age: 72
End: 2022-09-12
Payer: MEDICARE

## 2022-09-12 VITALS
BODY MASS INDEX: 28.7 KG/M2 | DIASTOLIC BLOOD PRESSURE: 82 MMHG | HEART RATE: 94 BPM | WEIGHT: 152 LBS | RESPIRATION RATE: 20 BRPM | OXYGEN SATURATION: 93 % | HEIGHT: 61 IN | SYSTOLIC BLOOD PRESSURE: 138 MMHG | TEMPERATURE: 97 F

## 2022-09-12 DIAGNOSIS — A49.9 BACTERIAL INFECTION: ICD-10-CM

## 2022-09-12 DIAGNOSIS — Z09 HOSPITAL DISCHARGE FOLLOW-UP: Primary | ICD-10-CM

## 2022-09-12 DIAGNOSIS — Z79.899 ENCOUNTER FOR LONG-TERM (CURRENT) USE OF MEDICATIONS: ICD-10-CM

## 2022-09-12 DIAGNOSIS — J96.11 CHRONIC HYPOXEMIC RESPIRATORY FAILURE: ICD-10-CM

## 2022-09-12 PROBLEM — G47.00 INSOMNIA: Status: ACTIVE | Noted: 2018-11-05

## 2022-09-12 PROBLEM — J30.9 ALLERGIC RHINITIS: Status: ACTIVE | Noted: 2018-11-05

## 2022-09-12 PROCEDURE — 99999 PR PBB SHADOW E&M-EST. PATIENT-LVL IV: CPT | Mod: PBBFAC,,, | Performed by: FAMILY MEDICINE

## 2022-09-12 PROCEDURE — 99214 OFFICE O/P EST MOD 30 MIN: CPT | Mod: PBBFAC,PO | Performed by: FAMILY MEDICINE

## 2022-09-12 PROCEDURE — 99214 OFFICE O/P EST MOD 30 MIN: CPT | Mod: S$PBB,,, | Performed by: FAMILY MEDICINE

## 2022-09-12 PROCEDURE — 99214 PR OFFICE/OUTPT VISIT, EST, LEVL IV, 30-39 MIN: ICD-10-PCS | Mod: S$PBB,,, | Performed by: FAMILY MEDICINE

## 2022-09-12 PROCEDURE — 99999 PR PBB SHADOW E&M-EST. PATIENT-LVL IV: ICD-10-PCS | Mod: PBBFAC,,, | Performed by: FAMILY MEDICINE

## 2022-09-12 RX ORDER — DOXYCYCLINE 100 MG/1
100 CAPSULE ORAL EVERY 12 HOURS
Qty: 20 CAPSULE | Refills: 0 | Status: SHIPPED | OUTPATIENT
Start: 2022-09-12 | End: 2023-03-10

## 2022-09-12 RX ORDER — METHYLPREDNISOLONE 4 MG/1
TABLET ORAL
Qty: 21 TABLET | Refills: 0 | Status: SHIPPED | OUTPATIENT
Start: 2022-09-12 | End: 2023-03-10

## 2022-09-12 NOTE — ASSESSMENT & PLAN NOTE
Reviewed ER visit.  Patient today continues to have respiratory issues.  Starting antibiotic and steroids.  Referral to Pulmonary for further evaluation of her chronic conditions.  ER precautions.    Discussed condition course and signs and symptoms to expect.  Patient advised take anti-inflammatories and or Tylenol for pain or fever.  ER precautions.  Call MD or follow-up to clinic if not improving or worsening symptoms.

## 2022-09-12 NOTE — PROGRESS NOTES
PLAN:      Problem List Items Addressed This Visit       Encounter for long-term (current) use of medications (Chronic)     Complete history and physical was completed today.  Complete and thorough medication reconciliation was performed.  Discussed risks and benefits of medications.  Advised patient on orders and health maintenance.  We discussed old records and old labs if available.  Will request any records not available through epic.  Continue current medications listed on your summary sheet.           Chronic hypoxemic respiratory failure (Chronic)     Patient strongly encouraged to use oxygen at all time.  Patient was provided oxygen while here in the clinic today.  Referral to pulmonary.  ER precautions.         Relevant Medications    methylPREDNISolone (MEDROL DOSEPACK) 4 mg tablet    Other Relevant Orders    Ambulatory referral/consult to Pulmonology    Hospital discharge follow-up - Primary     Reviewed ER visit.  Patient today continues to have respiratory issues.  Starting antibiotic and steroids.  Referral to Pulmonary for further evaluation of her chronic conditions.  ER precautions.    Discussed condition course and signs and symptoms to expect.  Patient advised take anti-inflammatories and or Tylenol for pain or fever.  ER precautions.  Call MD or follow-up to clinic if not improving or worsening symptoms.           Bacterial infection    Relevant Medications    doxycycline (MONODOX) 100 MG capsule    methylPREDNISolone (MEDROL DOSEPACK) 4 mg tablet     Future Appointments       Date Provider Specialty Appt Notes    9/21/2022  Lab [facility]    9/30/2022 Cate Ferguson NP Hematology and Oncology 3mth fu/labs prior/poss phleb    9/30/2022  Chemotherapy poss phlebotomy     11/14/2022 Heather Enciso PA-C Pulmonology Chronic hypoxemic respiratory failure [J96.11]           Medication Management for assessment above:   Medication List with Changes/Refills   New Medications    DOXYCYCLINE (MONODOX)  100 MG CAPSULE    Take 1 capsule (100 mg total) by mouth every 12 (twelve) hours.    METHYLPREDNISOLONE (MEDROL DOSEPACK) 4 MG TABLET    follow package directions   Current Medications    ALBUTEROL (VENTOLIN HFA) 90 MCG/ACTUATION INHALER    INHALE ONE OR TWO PUFFS BY MOUTH EVERY FOUR TO SIX HOURS AS NEEDED FOR WHEEZING    ALBUTEROL-IPRATROPIUM (DUO-NEB) 2.5 MG-0.5 MG/3 ML NEBULIZER SOLUTION    Take 3 mLs by nebulization every 4 (four) hours as needed for Wheezing or Shortness of Breath.    AMLODIPINE-BENAZEPRIL 10-20MG (LOTREL) 10-20 MG PER CAPSULE    Take 1 capsule by mouth once daily.    ANORO ELLIPTA 62.5-25 MCG/ACTUATION DSDV    INHALE ONE PUFF BY MOUTH DAILY    ATORVASTATIN (LIPITOR) 40 MG TABLET    Take 1 tablet (40 mg total) by mouth once daily.    B COMPLEX VITAMINS CAPSULE    Take 1 capsule by mouth once daily.    CALCIUM CARBONATE (OS-FREDY) 500 MG CALCIUM (1,250 MG) TABLET    Take 1 tablet (500 mg total) by mouth once daily.    CALCIUM-VITAMIN D3 (OS-FREDY 500 + D3) 500 MG(1,250MG) -200 UNIT PER TABLET    TAKE ONE TABLET BY MOUTH DAILY     CETIRIZINE (ZYRTEC) 10 MG TABLET    Take 1 tablet (10 mg total) by mouth once daily. for 10 days    LEVOTHYROXINE (SYNTHROID) 50 MCG TABLET    TAKE ONE TABLET BY MOUTH EVERY MORNING WITH BREAKFAST.    METFORMIN (GLUCOPHAGE) 1000 MG TABLET    Take 1 tablet (1,000 mg total) by mouth 2 (two) times daily with meals.    MULTIVITAMIN CAPSULE    Take 1 capsule by mouth once daily.    OFLOXACIN (OCUFLOX) 0.3 % OPHTHALMIC SOLUTION    Apply 5 drops into each ear twice daily for 7 days    OMEPRAZOLE (PRILOSEC) 20 MG CAPSULE    Take 1 capsule (20 mg total) by mouth once daily.    ONDANSETRON (ZOFRAN-ODT) 4 MG TBDL    Take by mouth.   Discontinued Medications    SULFAMETHOXAZOLE-TRIMETHOPRIM 800-160MG (BACTRIM DS) 800-160 MG TAB    Take 1 tablet by mouth 2 (two) times daily.       Jose Steinberg,  M.D.  ==========================================================================  Subjective:   Patient ID: Kami Higgins is a 71 y.o. female.  has a past medical history of AAA (abdominal aortic aneurysm) without rupture, COPD (chronic obstructive pulmonary disease), Hyperlipidemia, Hypertension, Pulmonary nodule, and Thyroid disease.   Chief Complaint: Follow-up and right side pain (Went to ER on 9/9/22 and they diagnosed her with gastritis)      Problem List Items Addressed This Visit       Encounter for long-term (current) use of medications (Chronic)    Overview     September 2022: Reviewed labs.  07/30/2019 \CHRONIC long-term drug therapy for managed conditions. See medication list. Reports compliance.  No side effects reported.  Routine lab work is being monitored.  Patient does  need refills today.   Updating labs today.  Requesting records from [facility].  CHRONIC. Stable. Compliant with medications for managed conditions. See medication list. No SE reported.   Routine lab analysis is being monitored. Refills were addressed.  Lab Results   Component Value Date    WBC 8.89 09/09/2022    HGB 15.5 09/09/2022    HCT 46.7 09/09/2022    MCV 92 09/09/2022     09/09/2022         Chemistry        Component Value Date/Time     (L) 09/09/2022 1520    K 4.6 09/09/2022 1520     09/09/2022 1520    CO2 25 09/09/2022 1520    CO2 8 06/22/2022 1205    BUN 16 09/09/2022 1520    CREATININE 0.8 09/09/2022 1520    GLU 82 09/09/2022 1520        Component Value Date/Time    CALCIUM 9.0 09/09/2022 1520    ALKPHOS 84 09/09/2022 1520    AST 14 09/09/2022 1520    ALT 9 (L) 09/09/2022 1520    BILITOT 0.4 09/09/2022 1520    ESTGFRAFRICA >60.0 06/22/2022 1205    EGFRNONAA >60.0 06/22/2022 1205          Lab Results   Component Value Date    TSH 2.611 05/19/2022    R9HLOJL 6.4 07/30/2019    FREET4 0.75 05/19/2022    T3FREE 2.5 05/19/2022              Current Assessment & Plan     Complete history and physical was  completed today.  Complete and thorough medication reconciliation was performed.  Discussed risks and benefits of medications.  Advised patient on orders and health maintenance.  We discussed old records and old labs if available.  Will request any records not available through epic.  Continue current medications listed on your summary sheet.           Chronic hypoxemic respiratory failure (Chronic)    Overview     Chronic.  September 2022: Patient presents to clinic today off of her oxygen.  Patient states that she went without power last night.  She states that her oxygen saturation normally runs in the 80 percentile.  She does have portable oxygen by chooses not to use it.  She is not currently established with Pulmonary and needs a another referral.  Her her previous pulmonologist was in [place].    Previous history:  Uncontrolled.  Patient is normally on continuous oxygen portable 2liters.  She reports that she ran out of oxygen and was unable to get refills.         Current Assessment & Plan     Patient strongly encouraged to use oxygen at all time.  Patient was provided oxygen while here in the clinic today.  Referral to pulmonary.  ER precautions.         Hospital discharge follow-up - Primary    Overview     SCRIBE #1 NOTE: IPatrick, am scribing for, and in the presence of, Bebo Duenas Jr., MD. I have scribed the entire note.       History           Chief Complaint   Patient presents with    Flank Pain       Right flank pain with nausea, decreased PO intake; denies new urinary symptoms           Review of patient's allergies indicates:   Allergen Reactions    Aspirin, buffered      Codeine      Penicillins Hives    Singulair [montelukast]            History of Present Illness      HPI     9/9/2022, 3:34 PM  History obtained from the patient                  History of Present Illness: Kami Higgins is a 71 y.o. female patient with a PMHx of HTN, HLD, COPD, AAA without rupture who presents to the  Emergency Department for evaluation of right flank pain x 1 week. Symptoms are constant and moderate in severity. No mitigating or exacerbating factors reported. Associated sxs include decreased PO intake. Patient denies any dysuria, hematuria, n/v/d, abd pain, fever, and all other sxs at this time. No prior tx reported. No further complaints or concerns at this time.         Arrival mode: Personal vehicle     PCP: Jose Steinberg MD         Past Medical History:       Past Medical History:   Diagnosis Date    AAA (abdominal aortic aneurysm) without rupture      COPD (chronic obstructive pulmonary disease)      Hyperlipidemia      Hypertension      Pulmonary nodule      Thyroid disease           Past Surgical History:        Past Surgical History:   Procedure Laterality Date    ABDOMINAL AORTIC ANEURYSM REPAIR        cardiac catheterization   2017    HYSTERECTOMY        THYROIDECTOMY        TUBAL LIGATION              Family History:        Family History   Problem Relation Age of Onset    Colon cancer Neg Hx      Stomach cancer Neg Hx      Esophageal cancer Neg Hx           Social History:  Social History            Tobacco Use    Smoking status: Some Days       Packs/day: 0.50       Types: Cigarettes    Smokeless tobacco: Never   Substance and Sexual Activity    Alcohol use: Never    Drug use: Never    Sexual activity: Not Currently       Partners: Male          Review of Systems      Review of Systems   Constitutional:  Negative for fever.   HENT:  Negative for sore throat.    Respiratory:  Negative for shortness of breath.    Cardiovascular:  Negative for chest pain.   Gastrointestinal:  Negative for abdominal pain, diarrhea, nausea and vomiting.   Genitourinary:  Positive for flank pain. Negative for dysuria.   Musculoskeletal:  Negative for back pain.   Skin:  Negative for rash.   Neurological:  Negative for weakness.   Hematological:  Does not bruise/bleed easily.   All other systems reviewed and are  negative.   Physical Exam             Initial Vitals [09/09/22 1402]   BP Pulse Resp Temp SpO2   109/63 94 20 98.1 °F (36.7 °C) (!) 87 %       MAP           --              Physical Exam  Nursing Notes and Vital Signs Reviewed.  Constitutional: Patient is in no acute distress. Well-developed and well-nourished.  Head: Atraumatic. Normocephalic.  Eyes: PERRL. EOM intact. Conjunctivae are not pale. No scleral icterus.  ENT: Mucous membranes are moist. Oropharynx is clear and symmetric.    Neck: Supple. Full ROM. No lymphadenopathy.  Cardiovascular: Regular rate. Regular rhythm. No murmurs, rubs, or gallops. Distal pulses are 2+ and symmetric.  Pulmonary/Chest: No respiratory distress. Clear to auscultation bilaterally. No wheezing or rales.  Abdominal: Soft and non-distended. Right flank tenderness to percussion  No rebound, guarding, or rigidity. Good bowel sounds.  Genitourinary: No CVA tenderness  Musculoskeletal: Moves all extremities. No obvious deformities. No edema. No calf tenderness.  Skin: Warm and dry.  Neurological:  Alert, awake, and appropriate.  Normal speech.  No acute focal neurological deficits are appreciated.  Psychiatric: Normal affect. Good eye contact. Appropriate in content.      ED Course   Procedures  ED Vital Signs:        Vitals:     09/09/22 1402 09/09/22 1532 09/09/22 1632   BP: 109/63 128/75 134/63   Pulse: 94 80 80   Resp: 20 (!) 24 (!) 24   Temp: 98.1 °F (36.7 °C)       TempSrc: Oral       SpO2: (!) 87% (!) 92% (!) 91%   Weight: 68.6 kg (151 lb 5.5 oz)             Abnormal Lab Results:        Labs Reviewed   CBC W/ AUTO DIFFERENTIAL - Abnormal; Notable for the following components:       Result Value      RDW 18.5 (*)       All other components within normal limits   COMPREHENSIVE METABOLIC PANEL - Abnormal; Notable for the following components:     Sodium 135 (*)       Albumin 3.3 (*)       ALT 9 (*)       All other components within normal limits   URINALYSIS, REFLEX TO URINE CULTURE  - Abnormal; Notable for the following components:     Protein, UA 1+ (*)       Urobilinogen, UA 2.0-3.0 (*)       Leukocytes, UA Trace (*)       All other components within normal limits     Narrative:      Specimen Source->Urine   LIPASE   URINALYSIS MICROSCOPIC     Narrative:      Specimen Source->Urine         All Lab Results:        Results for orders placed or performed during the hospital encounter of 09/09/22   CBC auto differential   Result Value Ref Range     WBC 8.89 3.90 - 12.70 K/uL     RBC 5.09 4.00 - 5.40 M/uL     Hemoglobin 15.5 12.0 - 16.0 g/dL     Hematocrit 46.7 37.0 - 48.5 %     MCV 92 82 - 98 fL     MCH 30.5 27.0 - 31.0 pg     MCHC 33.2 32.0 - 36.0 g/dL     RDW 18.5 (H) 11.5 - 14.5 %     Platelets 242 150 - 450 K/uL     MPV 9.3 9.2 - 12.9 fL     Immature Granulocytes 0.4 0.0 - 0.5 %     Gran # (ANC) 6.3 1.8 - 7.7 K/uL     Immature Grans (Abs) 0.04 0.00 - 0.04 K/uL     Lymph # 1.8 1.0 - 4.8 K/uL     Mono # 0.6 0.3 - 1.0 K/uL     Eos # 0.1 0.0 - 0.5 K/uL     Baso # 0.07 0.00 - 0.20 K/uL     nRBC 0 0 /100 WBC     Gran % 70.9 38.0 - 73.0 %     Lymph % 19.7 18.0 - 48.0 %     Mono % 7.2 4.0 - 15.0 %     Eosinophil % 1.0 0.0 - 8.0 %     Basophil % 0.8 0.0 - 1.9 %     Differential Method Automated     Comprehensive metabolic panel   Result Value Ref Range     Sodium 135 (L) 136 - 145 mmol/L     Potassium 4.6 3.5 - 5.1 mmol/L     Chloride 102 95 - 110 mmol/L     CO2 25 23 - 29 mmol/L     Glucose 82 70 - 110 mg/dL     BUN 16 8 - 23 mg/dL     Creatinine 0.8 0.5 - 1.4 mg/dL     Calcium 9.0 8.7 - 10.5 mg/dL     Total Protein 7.0 6.0 - 8.4 g/dL     Albumin 3.3 (L) 3.5 - 5.2 g/dL     Total Bilirubin 0.4 0.1 - 1.0 mg/dL     Alkaline Phosphatase 84 55 - 135 U/L     AST 14 10 - 40 U/L     ALT 9 (L) 10 - 44 U/L     Anion Gap 8 8 - 16 mmol/L     eGFR >60 >60 mL/min/1.73 m^2   Lipase   Result Value Ref Range     Lipase 32 4 - 60 U/L   Urinalysis, Reflex to Urine Culture Urine, Clean Catch     Specimen: Urine   Result  Value Ref Range     Specimen UA Urine, Clean Catch       Color, UA Yellow Yellow, Straw, Jennifer     Appearance, UA Clear Clear     pH, UA 6.0 5.0 - 8.0     Specific Gravity, UA 1.025 1.005 - 1.030     Protein, UA 1+ (A) Negative     Glucose, UA Negative Negative     Ketones, UA Negative Negative     Bilirubin (UA) Negative Negative     Occult Blood UA Negative Negative     Nitrite, UA Negative Negative     Urobilinogen, UA 2.0-3.0 (A) <2.0 EU/dL     Leukocytes, UA Trace (A) Negative   Urinalysis Microscopic   Result Value Ref Range     RBC, UA 0 0 - 4 /hpf     WBC, UA 1 0 - 5 /hpf     Bacteria None None-Occ /hpf     Hyaline Casts, UA 1 0-1/lpf /lpf     Microscopic Comment SEE COMMENT           Imaging Results:  Imaging Results                  CT Renal Stone Study ABD Pelvis WO (Final result)  Result time 09/09/22 14:56:47            Final result by Sriram Reyes MD (09/09/22 14:56:47)                           Impression:        No evidence of obstructive uropathy.     See findings above.  Evaluation of solid organ and vascular pathology is limited due to lack of IV contrast.     All CT scans at this facility use dose modulation, iterative reconstruction, and/or weight based dosing when appropriate to reduce radiation dose to as low as reasonable achievable.        Electronically signed by:     Sriram Reyes MD  Date:                                            09/09/2022  Time:                                            14:56                     Narrative:     EXAMINATION:  CT RENAL STONE STUDY ABD PELVIS WO     CLINICAL HISTORY:  Flank pain, kidney stone suspected;     TECHNIQUE:  Low dose axial images, sagittal and coronal reformations were obtained from the lung bases to the pubic symphysis.  Oral contrast was not administered.     COMPARISON:  None     FINDINGS:  Heart: Normal size. No effusion.  Aortic valve calcifications.     Lung Bases: Clear.  Scarring within the right middle lobe.     Liver: Normal size  and attenuation. No focal lesions.     Gallbladder: Not seen     Bile Ducts: No dilatation.     Pancreas: No obvious mass. No peripancreatic fat stranding.     Spleen: Normal.     Adrenals: Normal.     Kidneys/Ureters: Bilateral renal cortical thinning.  No definite mass.  No mass, hydroureteronephrosis, or nephroureterolithiasis.     Bladder: No wall thickening.     Reproductive organs: Normal.     GI Tract/Mesentery: Stomach is largely collapsed.  Mild rugal fold thickening within the body of the stomach could reflect gastritis.  No evidence of bowel obstruction or inflammation.  Extensive diverticulosis seen throughout the large bowel greatest within the sigmoid colon.  Moderate constipation.  Appendix is not seen.  Shotty adenopathy.     Peritoneal Space: No ascites or free air.     Retroperitoneum: Shotty adenopathy.     Abdominal wall: Normal.     Vasculature: Aneurysm of the distal thoracic aorta measuring up to 4.2 cm.  Descending thoracic aorta is ectatic measuring 3.2 cm.  Aortic endograft noted traversing and abdominal aortic aneurysm measuring 5.8 x 6.1 cm.  No comparison is available.  Calcifications are suspected within the aneurysm sac.  Evaluation for endoleak not possible without contrast.     Bones: No acute fracture. No suspicious lytic or sclerotic lesions.                                                       The Emergency Provider reviewed the vital signs and test results, which are outlined above.      ED Discussion      5:54 PM: Reassessed pt at this time. Discussed with pt all pertinent ED information and results. Discussed pt dx and plan of tx. Gave pt all f/u and return to the ED instructions. All questions and concerns were addressed at this time. Pt expresses understanding of information and instructions, and is comfortable with plan to discharge. Pt is stable for discharge.     I discussed with patient and/or family/caretaker that evaluation in the ED does not suggest any emergent or  life threatening medical conditions requiring immediate intervention beyond what was provided in the ED, and I believe patient is safe for discharge.  Regardless, an unremarkable evaluation in the ED does not preclude the development or presence of a serious of life threatening condition. As such, patient was instructed to return immediately for any worsening or change in current symptoms.     Medical Decision Making:   Clinical Tests:   Lab Tests: Ordered and Reviewed  Radiological Study: Ordered and Reviewed          ED Medication(s):  Medications   ondansetron injection 4 mg (4 mg Intravenous Given 9/9/22 0682)             New Prescriptions     No medications on file           Follow-up Information         Jose Steinberg MD. Schedule an appointment as soon as possible for a visit in 1 week.    Specialty: Family Medicine  Contact information:  [address] [facility] [address] [phone]                    [facility]    Specialty: Emergency Medicine  Why: As needed, If symptoms worsen  Contact information:  [address] [facility] [address] [phone]                                       Scribe Attestation:   Scribe #1: I performed the above scribed service and the documentation accurately describes the services I performed. I attest to the accuracy of the note.      Attending:   Physician Attestation Statement for Scribe #1: I, Bebo Duenas Jr., MD, personally performed the services described in this documentation, as scribed by Patrick Mcneill, in my presence, and it is both accurate and complete.             Clinical Impression          ICD-10-CM ICD-9-CM   1. Gastritis, presence of bleeding unspecified, unspecified chronicity, unspecified gastritis type  K29.70 535.50   2. Constipation, unspecified constipation type  K59.00 564.00   3. Diverticulosis  K57.90 562.10         Disposition:   Disposition: Discharged  Condition: Stable              Current  Assessment & Plan     Reviewed ER visit.  Patient today continues to have respiratory issues.  Starting antibiotic and steroids.  Referral to Pulmonary for further evaluation of her chronic conditions.  ER precautions.    Discussed condition course and signs and symptoms to expect.  Patient advised take anti-inflammatories and or Tylenol for pain or fever.  ER precautions.  Call MD or follow-up to clinic if not improving or worsening symptoms.           Bacterial infection        Review of patient's allergies indicates:   Allergen Reactions    Aspirin, buffered     Codeine     Penicillins Hives    Singulair [montelukast]      Current Outpatient Medications   Medication Instructions    albuterol (VENTOLIN HFA) 90 mcg/actuation inhaler INHALE ONE OR TWO PUFFS BY MOUTH EVERY FOUR TO SIX HOURS AS NEEDED FOR WHEEZING     albuterol-ipratropium (DUO-NEB) 2.5 mg-0.5 mg/3 mL nebulizer solution 3 mLs, Nebulization, Every 4 hours PRN    amlodipine-benazepril 10-20mg (LOTREL) 10-20 mg per capsule 1 capsule, Oral, Daily    ANORO ELLIPTA 62.5-25 mcg/actuation DsDv INHALE ONE PUFF BY MOUTH DAILY     atorvastatin (LIPITOR) 40 mg, Oral, Daily    b complex vitamins capsule 1 capsule, Oral, Daily    calcium carbonate (OS-FREDY) 500 mg, Oral, Daily    calcium-vitamin D3 (OS-FREDY 500 + D3) 500 mg(1,250mg) -200 unit per tablet TAKE ONE TABLET BY MOUTH DAILY     cetirizine (ZYRTEC) 10 mg, Oral, Daily    doxycycline (MONODOX) 100 mg, Oral, Every 12 hours    levothyroxine (SYNTHROID) 50 MCG tablet TAKE ONE TABLET BY MOUTH EVERY MORNING WITH BREAKFAST.     metFORMIN (GLUCOPHAGE) 1,000 mg, Oral, 2 times daily with meals    methylPREDNISolone (MEDROL DOSEPACK) 4 mg tablet follow package directions    multivitamin capsule 1 capsule, Oral, Daily    ofloxacin (OCUFLOX) 0.3 % ophthalmic solution Apply 5 drops into each ear twice daily for 7 days    omeprazole (PRILOSEC) 20 mg, Oral, Daily    ondansetron (ZOFRAN-ODT) 4 MG TbDL Oral      I have  "reviewed the PMH, social history, FamilyHx, surgical history, allergies and medications documented / confirmed by the patient at the time of this visit.  Review of Systems   Constitutional:  Positive for fatigue. Negative for appetite change, chills and fever.   HENT:  Positive for ear pain. Negative for congestion, rhinorrhea and sore throat.    Eyes:  Negative for visual disturbance.   Respiratory:  Positive for shortness of breath (chronic).    Cardiovascular:  Negative for chest pain, palpitations and leg swelling.   Gastrointestinal:  Negative for abdominal pain, diarrhea and vomiting.   Genitourinary:  Negative for difficulty urinating, dysuria and hematuria.   Musculoskeletal:  Negative for arthralgias and myalgias.   Skin:  Negative for rash and wound.   Neurological:  Negative for dizziness and headaches.   Psychiatric/Behavioral:  Negative for behavioral problems. The patient is not nervous/anxious.    Objective:   /82   Pulse 94   Temp 97.3 °F (36.3 °C) (Temporal)   Resp 20   Ht 5' 1" (1.549 m)   Wt 68.9 kg (152 lb 0.1 oz)   SpO2 (!) 89%   BMI 28.72 kg/m²   Physical Exam  Vitals reviewed.   Constitutional:       General: She is not in acute distress.     Appearance: Normal appearance. She is well-developed. She is not ill-appearing, toxic-appearing or diaphoretic.   HENT:      Head: Normocephalic and atraumatic.      Right Ear: Hearing and external ear normal. A middle ear effusion is present.      Left Ear: Hearing and external ear normal. A middle ear effusion is present.      Nose: Congestion present.   Eyes:      General: Lids are normal.      Extraocular Movements: Extraocular movements intact.      Conjunctiva/sclera: Conjunctivae normal.   Cardiovascular:      Rate and Rhythm: Normal rate.      Heart sounds: Normal heart sounds.   Pulmonary:      Effort: Pulmonary effort is normal. No respiratory distress.      Breath sounds: Wheezing present.      Comments: On 2L NC  Abdominal:      " General: Bowel sounds are normal.      Palpations: Abdomen is soft.   Musculoskeletal:         General: Normal range of motion.      Cervical back: Normal range of motion.      Right lower leg: No edema.      Left lower leg: No edema.   Skin:     General: Skin is warm and dry.      Coloration: Skin is not pale.      Findings: No rash.   Neurological:      Mental Status: She is alert and oriented to person, place, and time. Mental status is at baseline. She is not disoriented.   Psychiatric:         Attention and Perception: She is attentive.         Mood and Affect: Mood normal. Mood is not anxious or depressed.         Speech: Speech normal. Speech is not rapid and pressured or slurred.         Behavior: Behavior normal. Behavior is not agitated, aggressive or hyperactive. Behavior is cooperative.         Thought Content: Thought content normal. Thought content is not paranoid or delusional. Thought content does not include homicidal or suicidal ideation. Thought content does not include homicidal or suicidal plan.         Cognition and Memory: Memory is not impaired.         Judgment: Judgment normal.      Patient is wearing a mask which limits physical exam due to COVID restrictions.   Assessment:     1. Hospital discharge follow-up    2. Encounter for long-term (current) use of medications    3. Chronic hypoxemic respiratory failure    4. Bacterial infection      MDM:   Moderate medical complexity.  Moderate risk.  Total time: 35 minutes.  This includes total time spent on the encounter, which includes face to face time and non-face to face time preparing to see the patient (eg, review of previous medical records, tests), Obtaining and/or reviewing separately obtained history, documenting clinical information in the electronic or other health record, independently interpreting results (not separately reported)/communicating results to the patient/family/caregiver, and/or care coordination (not separately  reported).    I have Reviewed and summarized old records.  I have performed thorough medication reconciliation today and discussed risk and benefits of medications.  I have reviewed labs and discussed with patient.  All questions were answered.  I am requesting old records and will review them once they are available.    I have signed for the following orders AND/OR meds.  Orders Placed This Encounter   Procedures    Ambulatory referral/consult to Pulmonology     Standing Status:   Future     Standing Expiration Date:   10/12/2023     Referral Priority:   Routine     Referral Type:   Consultation     Referral Reason:   Specialty Services Required     Requested Specialty:   Pulmonary Disease     Number of Visits Requested:   1     Medications Ordered This Encounter   Medications    doxycycline (MONODOX) 100 MG capsule     Sig: Take 1 capsule (100 mg total) by mouth every 12 (twelve) hours.     Dispense:  20 capsule     Refill:  0    methylPREDNISolone (MEDROL DOSEPACK) 4 mg tablet     Sig: follow package directions     Dispense:  21 tablet     Refill:  0        Follow up if symptoms worsen or fail to improve.    If no improvement in symptoms or symptoms worsen, advised to call/follow-up at clinic or go to ER. Patient voiced understanding and all questions/concerns were addressed.   DISCLAIMER: This note was compiled by using a speech recognition dictation system and therefore please be aware that typographical / speech recognition errors can and do occur.  Please contact me if you see any errors specifically.    Jose Steinberg M.D.       Office: [phone]   [id number] [place] [address]  FAX: [phone]

## 2022-09-12 NOTE — ASSESSMENT & PLAN NOTE
Patient strongly encouraged to use oxygen at all time.  Patient was provided oxygen while here in the clinic today.  Referral to pulmonary.  ER precautions.

## 2022-09-12 NOTE — PATIENT INSTRUCTIONS
Follow up if symptoms worsen or fail to improve.     Dear patient,   As a result of recent federal legislation (The Federal Cures Act), you may receive lab or pathology results from your visit in your [facility] account before your physician is able to contact you. Your physician or their representative will relay the results to you with their recommendations at their soonest availability.     If no improvement in symptoms or symptoms worsen, please be advised to call MD, follow-up at clinic and/or go to ER if becomes severe.    Jose Steinberg M.D.        We Offer TELEHEALTH & Same Day Appointments!   Book your Telehealth appointment with me through my nurse or   Clinic appointments on [employer]!    [address] [place] [address]    Office: [phone]   FAX: [phone]    Check out my Facebook Page and Follow Me at: [email or web address]    Check out my website at [employer] by clicking on: [email or web address]    To Schedule appointments online, go to [email or web address]: [email or web address]

## 2022-09-30 ENCOUNTER — LAB VISIT (OUTPATIENT)
Dept: LAB | Facility: HOSPITAL | Age: 72
End: 2022-09-30
Attending: NURSE PRACTITIONER
Payer: MEDICARE

## 2022-09-30 ENCOUNTER — OFFICE VISIT (OUTPATIENT)
Dept: HEMATOLOGY/ONCOLOGY | Facility: CLINIC | Age: 72
End: 2022-09-30
Payer: MEDICARE

## 2022-09-30 VITALS
HEART RATE: 82 BPM | DIASTOLIC BLOOD PRESSURE: 75 MMHG | BODY MASS INDEX: 28.93 KG/M2 | TEMPERATURE: 97 F | OXYGEN SATURATION: 85 % | HEIGHT: 61 IN | WEIGHT: 153.25 LBS | SYSTOLIC BLOOD PRESSURE: 109 MMHG

## 2022-09-30 DIAGNOSIS — F17.210 CIGARETTE SMOKER: ICD-10-CM

## 2022-09-30 DIAGNOSIS — R09.02 HYPOXIA: ICD-10-CM

## 2022-09-30 DIAGNOSIS — D75.1 POLYCYTHEMIA SECONDARY TO HYPOXIA: ICD-10-CM

## 2022-09-30 DIAGNOSIS — D75.1 SECONDARY POLYCYTHEMIA: ICD-10-CM

## 2022-09-30 DIAGNOSIS — R09.02 HYPOXIA: Primary | ICD-10-CM

## 2022-09-30 DIAGNOSIS — D75.1 POLYCYTHEMIA SECONDARY TO HYPOXIA: Primary | ICD-10-CM

## 2022-09-30 DIAGNOSIS — Z72.0 NICOTINE ABUSE: ICD-10-CM

## 2022-09-30 LAB
BASOPHILS # BLD AUTO: 0.08 K/UL (ref 0–0.2)
BASOPHILS NFR BLD: 0.9 % (ref 0–1.9)
DIFFERENTIAL METHOD: ABNORMAL
EOSINOPHIL # BLD AUTO: 0.1 K/UL (ref 0–0.5)
EOSINOPHIL NFR BLD: 1.6 % (ref 0–8)
ERYTHROCYTE [DISTWIDTH] IN BLOOD BY AUTOMATED COUNT: 17.6 % (ref 11.5–14.5)
HCT VFR BLD AUTO: 46.4 % (ref 37–48.5)
HGB BLD-MCNC: 15.6 G/DL (ref 12–16)
IMM GRANULOCYTES # BLD AUTO: 0.02 K/UL (ref 0–0.04)
IMM GRANULOCYTES NFR BLD AUTO: 0.2 % (ref 0–0.5)
LYMPHOCYTES # BLD AUTO: 2 K/UL (ref 1–4.8)
LYMPHOCYTES NFR BLD: 23.3 % (ref 18–48)
MCH RBC QN AUTO: 31.8 PG (ref 27–31)
MCHC RBC AUTO-ENTMCNC: 33.6 G/DL (ref 32–36)
MCV RBC AUTO: 95 FL (ref 82–98)
MONOCYTES # BLD AUTO: 0.7 K/UL (ref 0.3–1)
MONOCYTES NFR BLD: 8 % (ref 4–15)
NEUTROPHILS # BLD AUTO: 5.7 K/UL (ref 1.8–7.7)
NEUTROPHILS NFR BLD: 66 % (ref 38–73)
NRBC BLD-RTO: 0 /100 WBC
PLATELET # BLD AUTO: 163 K/UL (ref 150–450)
PMV BLD AUTO: 9.3 FL (ref 9.2–12.9)
RBC # BLD AUTO: 4.9 M/UL (ref 4–5.4)
WBC # BLD AUTO: 8.66 K/UL (ref 3.9–12.7)

## 2022-09-30 PROCEDURE — 99214 OFFICE O/P EST MOD 30 MIN: CPT | Mod: S$PBB,,, | Performed by: NURSE PRACTITIONER

## 2022-09-30 PROCEDURE — 85025 COMPLETE CBC W/AUTO DIFF WBC: CPT | Performed by: NURSE PRACTITIONER

## 2022-09-30 PROCEDURE — 99214 OFFICE O/P EST MOD 30 MIN: CPT | Mod: PBBFAC | Performed by: NURSE PRACTITIONER

## 2022-09-30 PROCEDURE — 99999 PR PBB SHADOW E&M-EST. PATIENT-LVL IV: CPT | Mod: PBBFAC,,, | Performed by: NURSE PRACTITIONER

## 2022-09-30 PROCEDURE — 36415 COLL VENOUS BLD VENIPUNCTURE: CPT | Performed by: NURSE PRACTITIONER

## 2022-09-30 PROCEDURE — 99214 PR OFFICE/OUTPT VISIT, EST, LEVL IV, 30-39 MIN: ICD-10-PCS | Mod: S$PBB,,, | Performed by: NURSE PRACTITIONER

## 2022-09-30 PROCEDURE — 99999 PR PBB SHADOW E&M-EST. PATIENT-LVL IV: ICD-10-PCS | Mod: PBBFAC,,, | Performed by: NURSE PRACTITIONER

## 2022-09-30 NOTE — PROGRESS NOTES
Subjective:      Patient ID: Kami Higgins is a 71 y.o. female.    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  5/19/2022 with hct 57.7 and found with elevated D-dimer.  CTA done 5/23/2022 - no evidence of PE.  States that she recently received a portable oxygen tank that is able to transport.  She is currently only using her oxygen at night.  States that she does continue to smoke, but not as much (about less then 10 per day vs. 2 packs per day)     She ran out of her oxygen 5/2022 and was unable to get new order due to change in insurance and pulmonologist retiring.        She also has been found with a 4 mm left upper lobe on CT that needs that requires monitoring every 6 months followed by pulmonology.  She is schedule as a new patient visit with Rachana Cronin NP on 8/5/2022 7/30/2019  Mammogram  Left  There is a 9 mm oval mass with circumscribed margins seen in the retroareolar region of the left breast.   Right  There is no evidence of suspicious masses, calcifications, or other abnormal findings.   Impression:  Left  Mass: Left breast 9 mm mass at the retroareolar position. Assessment: 0 - Incomplete. Ultrasound is recommended.   Right  There is no mammographic evidence of malignancy.  BI-RADS Category:   Overall: 0 - Incomplete: Needs Additional Imaging Evaluation   Recommendation:  Breast ultrasound is recommended.  8/5/2019 US left breast   Impression:  Left  Mass: Left breast 5 mm x 5 mm x 4 mm mass. Assessment: 3 - Probably benign. Short Interval Follow-Up in 6 Months is recommended.   BI-RADS Category:   Left: 3 - Probably Benign  Overall: 3 - Probably Benign  Recommendation:  Short interval follow-up is recommended in 6 Months     8/2019 Positive Cologuard testing    9/30/2022 Presents today to see if phlebotomy is needed  for secondary polycythemia due to smoking.  States that she did not do a mammogram because she does not need to.  Also states that she does not want to do colonoscopy. She alos told me that if she was found with cancer she would not want to be treated.  She states that she continues to smoke with no desire to quit.       Social History     Socioeconomic History    Marital status:    Tobacco Use    Smoking status: Some Days     Packs/day: 0.50     Types: Cigarettes    Smokeless tobacco: Never   Substance and Sexual Activity    Alcohol use: Never    Drug use: Never    Sexual activity: Not Currently     Partners: Male       Family History   Problem Relation Age of Onset    Colon cancer Neg Hx     Stomach cancer Neg Hx     Esophageal cancer Neg Hx        Past Surgical History:   Procedure Laterality Date    ABDOMINAL AORTIC ANEURYSM REPAIR      cardiac catheterization  2017    HYSTERECTOMY      THYROIDECTOMY      TUBAL LIGATION         Past Medical History:   Diagnosis Date    AAA (abdominal aortic aneurysm) without rupture     COPD (chronic obstructive pulmonary disease)     Hyperlipidemia     Hypertension     Pulmonary nodule     Thyroid disease        Review of Systems   Constitutional: Negative.    HENT: Negative.     Eyes: Negative.    Respiratory:  Positive for shortness of breath (with long walks - uses oxygen).         States that she is supposed to oxygen at night but she does not use.  She just puts a fan on at night and states that she breaths well.   Cardiovascular: Negative.    Gastrointestinal: Negative.    Endocrine: Negative.    Genitourinary: Negative.    Musculoskeletal: Negative.    Skin: Negative.    Allergic/Immunologic: Negative.    Neurological: Negative.    Hematological: Negative.    Psychiatric/Behavioral: Negative.          Medication List with Changes/Refills   Current Medications    ALBUTEROL (VENTOLIN HFA) 90 MCG/ACTUATION INHALER    INHALE ONE OR TWO PUFFS BY MOUTH EVERY FOUR TO SIX  HOURS AS NEEDED FOR WHEEZING    ALBUTEROL-IPRATROPIUM (DUO-NEB) 2.5 MG-0.5 MG/3 ML NEBULIZER SOLUTION    Take 3 mLs by nebulization every 4 (four) hours as needed for Wheezing or Shortness of Breath.    AMLODIPINE-BENAZEPRIL 10-20MG (LOTREL) 10-20 MG PER CAPSULE    Take 1 capsule by mouth once daily.    ANORO ELLIPTA 62.5-25 MCG/ACTUATION DSDV    INHALE ONE PUFF BY MOUTH DAILY    ATORVASTATIN (LIPITOR) 40 MG TABLET    Take 1 tablet (40 mg total) by mouth once daily.    B COMPLEX VITAMINS CAPSULE    Take 1 capsule by mouth once daily.    CALCIUM CARBONATE (OS-FREDY) 500 MG CALCIUM (1,250 MG) TABLET    Take 1 tablet (500 mg total) by mouth once daily.    CALCIUM-VITAMIN D3 (OS-FREDY 500 + D3) 500 MG(1,250MG) -200 UNIT PER TABLET    TAKE ONE TABLET BY MOUTH DAILY     CETIRIZINE (ZYRTEC) 10 MG TABLET    Take 1 tablet (10 mg total) by mouth once daily. for 10 days    DOXYCYCLINE (MONODOX) 100 MG CAPSULE    Take 1 capsule (100 mg total) by mouth every 12 (twelve) hours.    LEVOTHYROXINE (SYNTHROID) 50 MCG TABLET    TAKE ONE TABLET BY MOUTH EVERY MORNING WITH BREAKFAST.    METFORMIN (GLUCOPHAGE) 1000 MG TABLET    Take 1 tablet (1,000 mg total) by mouth 2 (two) times daily with meals.    METHYLPREDNISOLONE (MEDROL DOSEPACK) 4 MG TABLET    follow package directions    MULTIVITAMIN CAPSULE    Take 1 capsule by mouth once daily.    OFLOXACIN (OCUFLOX) 0.3 % OPHTHALMIC SOLUTION    Apply 5 drops into each ear twice daily for 7 days    OMEPRAZOLE (PRILOSEC) 20 MG CAPSULE    Take 1 capsule (20 mg total) by mouth once daily.    ONDANSETRON (ZOFRAN-ODT) 4 MG TBDL    Take by mouth.        Objective:     Vitals:    09/30/22 0942   BP: 109/75   Pulse: 82   Temp: 97.4 °F (36.3 °C)       Physical Exam  Vitals and nursing note reviewed.   Constitutional:       Appearance: Normal appearance.   HENT:      Head: Normocephalic and atraumatic.      Right Ear: External ear normal.      Left Ear: External ear normal.   Cardiovascular:      Rate and  Rhythm: Normal rate and regular rhythm.      Heart sounds: Normal heart sounds, S1 normal and S2 normal.   Pulmonary:      Effort: Pulmonary effort is normal.      Breath sounds: Normal breath sounds.   Abdominal:      General: There is no distension.   Musculoskeletal:         General: Normal range of motion.      Cervical back: Normal range of motion.   Skin:     General: Skin is warm and dry.   Neurological:      General: No focal deficit present.      Mental Status: She is alert and oriented to person, place, and time.   Psychiatric:         Attention and Perception: Attention and perception normal.         Mood and Affect: Mood and affect normal.         Speech: Speech normal.         Behavior: Behavior normal. Behavior is cooperative.         Thought Content: Thought content normal.         Cognition and Memory: Cognition and memory normal.         Judgment: Judgment normal.       Assessment:     Problem List Items Addressed This Visit          Oncology    Secondary polycythemia    Relevant Orders    CBC Auto Differential    Comprehensive Metabolic Panel    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 - Primary    Relevant Orders    CBC Auto Differential    Comprehensive Metabolic Panel       Other    Cigarette smoker    Relevant Orders    CBC Auto Differential    Comprehensive Metabolic Panel    Nicotine abuse    Relevant Orders    CBC Auto Differential    Comprehensive Metabolic Panel       Lab Results   Component Value Date    WBC 8.66 09/30/2022    RBC 4.90 09/30/2022    HGB 15.6 09/30/2022    HCT 46.4 09/30/2022    MCV 95 09/30/2022    MCH 31.8 (H) 09/30/2022    MCHC 33.6 09/30/2022    RDW 17.6 (H) 09/30/2022     09/30/2022    MPV 9.3 09/30/2022    GRAN 5.7 09/30/2022    GRAN 66.0 09/30/2022    LYMPH 2.0 09/30/2022    LYMPH 23.3 09/30/2022    MONO 0.7 09/30/2022    MONO 8.0 09/30/2022    EOS 0.1 09/30/2022    BASO 0.08 09/30/2022    EOSINOPHIL 1.6 09/30/2022    BASOPHIL 0.9 09/30/2022      Lab Results    Component Value Date     (L) 09/09/2022    K 4.6 09/09/2022     09/09/2022    CO2 25 09/09/2022    BUN 16 09/09/2022    CREATININE 0.8 09/09/2022    CALCIUM 9.0 09/09/2022    ANIONGAP 8 09/09/2022    ESTGFRAFRICA >60.0 06/22/2022    EGFRNONAA >60.0 06/22/2022     Lab Results   Component Value Date    ALT 9 (L) 09/09/2022    AST 14 09/09/2022    ALKPHOS 84 09/09/2022    BILITOT 0.4 09/09/2022       Plan:   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  -     CBC Auto Differential; Future; Expected date: 09/30/2022  -     Comprehensive Metabolic Panel; Future; Expected date: 09/30/2022    Secondary polycythemia  -     CBC Auto Differential; Future; Expected date: 09/30/2022  -     Comprehensive Metabolic Panel; Future; Expected date: 09/30/2022    Nicotine abuse  -     CBC Auto Differential; Future; Expected date: 09/30/2022  -     Comprehensive Metabolic Panel; Future; Expected date: 09/30/2022    Cigarette smoker  -     CBC Auto Differential; Future; Expected date: 09/30/2022  -     Comprehensive Metabolic Panel; Future; Expected date: 09/30/2022        Patient states that she does not want to proceed with colonoscopy.  She is aware that this could be a cancer.  She previously agreed to mammogram; however now states that she does not need it.  She states that she does not want chemotherapy if found with cancer.       Patient is with secondary polycythemia due to hypoxia.  Encouraged to wear oxygen continuously. She states that she uses O2 at 2-1/2 liters.  Advised not to exceed 5 liters O2.  Patient verbalized understanding.  She will f/u with pulmonology.  Recommended smoking cessation and offered initiation of smoking cessation program.  Patient states that she will continue working on this alone for now.         Previously recommended patient to take ASA 81 mg PO daily.  She states that she was told not to take ASA by Dr. Kelsey, vascular surgeon at [facility] so does not wan to do this.  Recommended stopping oral  iron.  Continue B12 supplementation. Patient verbalized understanding.    Discussed need for phlebotomy for hct >60%.  Patient verbalized understanding.  CBC monthly with RTC in 3 months with cbc.  Phlebotomy if hct >60     Collaborating Provider:  Dr. Laron Crawford    Thank You,  Marcelo Ferguson, FNP-C  Hematology Oncology

## 2022-10-04 ENCOUNTER — PATIENT MESSAGE (OUTPATIENT)
Dept: ADMINISTRATIVE | Facility: HOSPITAL | Age: 72
End: 2022-10-04
Payer: MEDICARE

## 2022-10-18 ENCOUNTER — PATIENT MESSAGE (OUTPATIENT)
Dept: ADMINISTRATIVE | Facility: HOSPITAL | Age: 72
End: 2022-10-18
Payer: MEDICARE

## 2022-11-03 ENCOUNTER — TELEPHONE (OUTPATIENT)
Dept: FAMILY MEDICINE | Facility: CLINIC | Age: 72
End: 2022-11-03
Payer: MEDICARE

## 2022-11-03 NOTE — TELEPHONE ENCOUNTER
----- Message from Elissa Saleem sent at 11/3/2022 11:59 AM CDT -----  Regarding: medication request  Name of Who is Calling: Kami           What is the request in detail: Patient is requesting a call back to get some medication called in for nausea, diarrhea and vomiting. She was in the ER with a family member for the same thing and now she has it.            Can the clinic reply by [facility]: No           What Number to Call Back if not in [facility]: [phone]

## 2022-11-04 DIAGNOSIS — J44.9 CHRONIC OBSTRUCTIVE PULMONARY DISEASE, UNSPECIFIED COPD TYPE: Primary | Chronic | ICD-10-CM

## 2022-11-14 ENCOUNTER — HOSPITAL ENCOUNTER (OUTPATIENT)
Dept: RADIOLOGY | Facility: HOSPITAL | Age: 72
Discharge: HOME | End: 2022-11-14
Attending: PHYSICIAN ASSISTANT
Payer: MEDICARE

## 2022-11-14 ENCOUNTER — CLINICAL SUPPORT (OUTPATIENT)
Dept: PULMONOLOGY | Facility: CLINIC | Age: 72
End: 2022-11-14
Payer: MEDICARE

## 2022-11-14 ENCOUNTER — OFFICE VISIT (OUTPATIENT)
Dept: PULMONOLOGY | Facility: CLINIC | Age: 72
End: 2022-11-14
Payer: MEDICARE

## 2022-11-14 VITALS
SYSTOLIC BLOOD PRESSURE: 123 MMHG | BODY MASS INDEX: 28.89 KG/M2 | WEIGHT: 153 LBS | DIASTOLIC BLOOD PRESSURE: 60 MMHG | RESPIRATION RATE: 18 BRPM | HEIGHT: 61 IN

## 2022-11-14 DIAGNOSIS — J96.11 CHRONIC HYPOXEMIC RESPIRATORY FAILURE: ICD-10-CM

## 2022-11-14 DIAGNOSIS — E11.65 TYPE 2 DIABETES MELLITUS WITH HYPERGLYCEMIA, WITHOUT LONG-TERM CURRENT USE OF INSULIN: Chronic | ICD-10-CM

## 2022-11-14 DIAGNOSIS — F17.218 CIGARETTE NICOTINE DEPENDENCE WITH OTHER NICOTINE-INDUCED DISORDER: ICD-10-CM

## 2022-11-14 DIAGNOSIS — R91.1 PULMONARY NODULE: ICD-10-CM

## 2022-11-14 DIAGNOSIS — J44.9 CHRONIC OBSTRUCTIVE PULMONARY DISEASE, UNSPECIFIED COPD TYPE: ICD-10-CM

## 2022-11-14 DIAGNOSIS — Z79.899 ENCOUNTER FOR LONG-TERM (CURRENT) USE OF MEDICATIONS: ICD-10-CM

## 2022-11-14 DIAGNOSIS — F17.210 CIGARETTE SMOKER: ICD-10-CM

## 2022-11-14 DIAGNOSIS — J44.9 CHRONIC OBSTRUCTIVE PULMONARY DISEASE, UNSPECIFIED COPD TYPE: Primary | ICD-10-CM

## 2022-11-14 DIAGNOSIS — H92.02 LEFT EAR PAIN: ICD-10-CM

## 2022-11-14 DIAGNOSIS — J44.9 CHRONIC OBSTRUCTIVE PULMONARY DISEASE, UNSPECIFIED COPD TYPE: Chronic | ICD-10-CM

## 2022-11-14 DIAGNOSIS — I10 HYPERTENSION, ESSENTIAL: Chronic | ICD-10-CM

## 2022-11-14 LAB
BRPFT: NORMAL
FEF 25 75 CHG: 4.7 %
FEF 25 75 LLN: 0.81
FEF 25 75 POST REF: 13.8 %
FEF 25 75 PRE REF: 13.2 %
FEF 25 75 REF: 1.75
FET100 CHG: 6.4 %
FEV1 CHG: 3.2 %
FEV1 FVC CHG: -2.7 %
FEV1 FVC LLN: 65
FEV1 FVC POST REF: 49.3 %
FEV1 FVC PRE REF: 50.7 %
FEV1 FVC REF: 79
FEV1 LLN: 1.46
FEV1 POST REF: 35 %
FEV1 PRE REF: 33.9 %
FEV1 REF: 2
FVC CHG: 6.1 %
FVC LLN: 1.87
FVC POST REF: 70.5 %
FVC PRE REF: 66.4 %
FVC REF: 2.56
PEF CHG: -33.5 %
PEF LLN: 3.72
PEF POST REF: 25.4 %
PEF PRE REF: 38.1 %
PEF REF: 5.27
POST FEF 25 75: 0.24 L/S
POST FET 100: 10.69 SEC
POST FEV1 FVC: 38.76 %
POST FEV1: 0.7 L
POST FVC: 1.81 L
POST PEF: 1.34 L/S
PRE FEF 25 75: 0.23 L/S
PRE FET 100: 10.05 SEC
PRE FEV1 FVC: 39.84 %
PRE FEV1: 0.68 L
PRE FVC: 1.7 L
PRE PEF: 2.01 L/S

## 2022-11-14 PROCEDURE — 99204 PR OFFICE/OUTPT VISIT, NEW, LEVL IV, 45-59 MIN: ICD-10-PCS | Mod: S$PBB,25,, | Performed by: PHYSICIAN ASSISTANT

## 2022-11-14 PROCEDURE — 99999 PR PBB SHADOW E&M-EST. PATIENT-LVL V: ICD-10-PCS | Mod: PBBFAC,,, | Performed by: PHYSICIAN ASSISTANT

## 2022-11-14 PROCEDURE — 94060 EVALUATION OF WHEEZING: CPT | Mod: PBBFAC

## 2022-11-14 PROCEDURE — 71046 X-RAY EXAM CHEST 2 VIEWS: CPT | Mod: TC

## 2022-11-14 PROCEDURE — 99204 OFFICE O/P NEW MOD 45 MIN: CPT | Mod: S$PBB,25,, | Performed by: PHYSICIAN ASSISTANT

## 2022-11-14 PROCEDURE — 71046 X-RAY EXAM CHEST 2 VIEWS: CPT | Mod: 26,,, | Performed by: RADIOLOGY

## 2022-11-14 PROCEDURE — 94060 EVALUATION OF WHEEZING: CPT | Mod: 26,S$PBB,, | Performed by: INTERNAL MEDICINE

## 2022-11-14 PROCEDURE — 99215 OFFICE O/P EST HI 40 MIN: CPT | Mod: PBBFAC,25 | Performed by: PHYSICIAN ASSISTANT

## 2022-11-14 PROCEDURE — 99999 PR PBB SHADOW E&M-EST. PATIENT-LVL V: CPT | Mod: PBBFAC,,, | Performed by: PHYSICIAN ASSISTANT

## 2022-11-14 PROCEDURE — 71046 XR CHEST PA AND LATERAL: ICD-10-PCS | Mod: 26,,, | Performed by: RADIOLOGY

## 2022-11-14 PROCEDURE — 94060 PR EVAL OF BRONCHOSPASM: ICD-10-PCS | Mod: 26,S$PBB,, | Performed by: INTERNAL MEDICINE

## 2022-11-14 RX ORDER — UMECLIDINIUM BROMIDE AND VILANTEROL TRIFENATATE 62.5; 25 UG/1; UG/1
POWDER RESPIRATORY (INHALATION)
Qty: 60 EACH | Refills: 11 | Status: SHIPPED | OUTPATIENT
Start: 2022-11-14 | End: 2023-04-30

## 2022-11-14 RX ORDER — AZITHROMYCIN 250 MG/1
TABLET, FILM COATED ORAL
Qty: 6 TABLET | Refills: 0 | Status: SHIPPED | OUTPATIENT
Start: 2022-11-14 | End: 2022-11-19
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
## 2022-11-14 NOTE — ASSESSMENT & PLAN NOTE
Anoro daily  Albuterol prn  Recommend Trelegy or adding LAMA, patient states she would like to stay on Anoro, will reassess next visit  [place] 33% FEV1, decline from last PFT 2019 44%  Stressed smoking cessation, daily exercise  Recommend flu vacccine patient declines today  UTD on pneumonia vaccine

## 2022-11-14 NOTE — ASSESSMENT & PLAN NOTE
Assistance with smoking cessation was offered, including:  []  Medications  [x]  Counseling  []  Printed Information on Smoking Cessation  [x]  Referral to a Smoking Cessation Program     Patient was counseled regarding smoking for 3-10 minutes.

## 2022-11-15 ENCOUNTER — TELEPHONE (OUTPATIENT)
Dept: PULMONOLOGY | Facility: CLINIC | Age: 72
End: 2022-11-15
Payer: MEDICARE

## 2022-11-15 NOTE — TELEPHONE ENCOUNTER
Chronic Disease Management  Called patient to schedule initial Pulmonary Disease Management appointment.   Patient will think about participating in PDM. She will call back to schedule.

## 2022-11-18 ENCOUNTER — PATIENT OUTREACH (OUTPATIENT)
Dept: ADMINISTRATIVE | Facility: HOSPITAL | Age: 72
End: 2022-11-18
Payer: MEDICARE

## 2022-12-03 LAB
LEFT EYE DM RETINOPATHY: NEGATIVE
RIGHT EYE DM RETINOPATHY: NEGATIVE

## 2022-12-07 ENCOUNTER — TELEPHONE (OUTPATIENT)
Dept: HEMATOLOGY/ONCOLOGY | Facility: CLINIC | Age: 72
End: 2022-12-07
Payer: MEDICARE

## 2022-12-07 NOTE — TELEPHONE ENCOUNTER
N/a lvm for pt to call back in regards to changes to her appt on 12/30. Nurse advised pt on vm to call back with any questions or concerns

## 2022-12-12 PROBLEM — Z09 HOSPITAL DISCHARGE FOLLOW-UP: Status: RESOLVED | Noted: 2022-09-12 | Resolved: 2022-12-12

## 2022-12-28 ENCOUNTER — PATIENT OUTREACH (OUTPATIENT)
Dept: ADMINISTRATIVE | Facility: HOSPITAL | Age: 72
End: 2022-12-28
Payer: MEDICARE

## 2023-01-03 ENCOUNTER — PATIENT OUTREACH (OUTPATIENT)
Dept: ADMINISTRATIVE | Facility: HOSPITAL | Age: 73
End: 2023-01-03
Payer: MEDICAID

## 2023-01-09 ENCOUNTER — PATIENT OUTREACH (OUTPATIENT)
Dept: ADMINISTRATIVE | Facility: HOSPITAL | Age: 73
End: 2023-01-09
Payer: MEDICAID

## 2023-01-25 ENCOUNTER — PATIENT MESSAGE (OUTPATIENT)
Dept: ADMINISTRATIVE | Facility: HOSPITAL | Age: 73
End: 2023-01-25
Payer: MEDICAID

## 2023-01-31 ENCOUNTER — TELEPHONE (OUTPATIENT)
Dept: FAMILY MEDICINE | Facility: CLINIC | Age: 73
End: 2023-01-31
Payer: MEDICAID

## 2023-01-31 NOTE — TELEPHONE ENCOUNTER
----- Message from Esperanza Faulkner sent at 1/31/2023  9:36 AM CST -----  Contact: pt  pt is calling and will be late and will be there in 20 minutes.  Pt is scheduled for 9:45am

## 2023-02-17 ENCOUNTER — PATIENT MESSAGE (OUTPATIENT)
Dept: ADMINISTRATIVE | Facility: HOSPITAL | Age: 73
End: 2023-02-17
Payer: MEDICAID

## 2023-03-10 ENCOUNTER — HOSPITAL ENCOUNTER (OUTPATIENT)
Dept: RADIOLOGY | Facility: HOSPITAL | Age: 73
Discharge: HOME | End: 2023-03-10
Attending: NURSE PRACTITIONER
Payer: COMMERCIAL

## 2023-03-10 ENCOUNTER — OFFICE VISIT (OUTPATIENT)
Dept: FAMILY MEDICINE | Facility: CLINIC | Age: 73
End: 2023-03-10
Payer: COMMERCIAL

## 2023-03-10 VITALS
HEART RATE: 87 BPM | WEIGHT: 150.31 LBS | DIASTOLIC BLOOD PRESSURE: 80 MMHG | TEMPERATURE: 98 F | SYSTOLIC BLOOD PRESSURE: 122 MMHG | HEIGHT: 61 IN | OXYGEN SATURATION: 90 % | RESPIRATION RATE: 18 BRPM | BODY MASS INDEX: 28.38 KG/M2

## 2023-03-10 DIAGNOSIS — J96.11 CHRONIC HYPOXEMIC RESPIRATORY FAILURE: Chronic | ICD-10-CM

## 2023-03-10 DIAGNOSIS — E11.65 TYPE 2 DIABETES MELLITUS WITH HYPERGLYCEMIA, WITHOUT LONG-TERM CURRENT USE OF INSULIN: Chronic | ICD-10-CM

## 2023-03-10 DIAGNOSIS — E78.2 MIXED HYPERLIPIDEMIA: ICD-10-CM

## 2023-03-10 DIAGNOSIS — Z01.818 PREOPERATIVE CLEARANCE: Primary | ICD-10-CM

## 2023-03-10 DIAGNOSIS — J44.9 CHRONIC OBSTRUCTIVE PULMONARY DISEASE, UNSPECIFIED COPD TYPE: Chronic | ICD-10-CM

## 2023-03-10 DIAGNOSIS — I10 HYPERTENSION, ESSENTIAL: Chronic | ICD-10-CM

## 2023-03-10 DIAGNOSIS — Z01.818 PREOPERATIVE CLEARANCE: ICD-10-CM

## 2023-03-10 PROBLEM — G47.00 INSOMNIA: Status: RESOLVED | Noted: 2018-11-05 | Resolved: 2023-03-10

## 2023-03-10 PROBLEM — J30.9 ALLERGIC RHINITIS: Status: RESOLVED | Noted: 2018-11-05 | Resolved: 2023-03-10

## 2023-03-10 PROBLEM — A49.9 BACTERIAL INFECTION: Status: RESOLVED | Noted: 2022-09-12 | Resolved: 2023-03-10

## 2023-03-10 PROBLEM — M79.89 SWELLING OF FINGER, RIGHT: Status: RESOLVED | Noted: 2019-09-20 | Resolved: 2023-03-10

## 2023-03-10 PROBLEM — K59.00 CONSTIPATION: Status: RESOLVED | Noted: 2022-09-09 | Resolved: 2023-03-10

## 2023-03-10 PROBLEM — R30.0 DYSURIA: Status: RESOLVED | Noted: 2022-08-19 | Resolved: 2023-03-10

## 2023-03-10 PROBLEM — R09.02 HYPOXIA: Status: RESOLVED | Noted: 2017-06-19 | Resolved: 2023-03-10

## 2023-03-10 PROCEDURE — 71046 X-RAY EXAM CHEST 2 VIEWS: CPT | Mod: 26,,, | Performed by: RADIOLOGY

## 2023-03-10 PROCEDURE — 71046 X-RAY EXAM CHEST 2 VIEWS: CPT | Mod: TC,PO

## 2023-03-10 PROCEDURE — 93010 EKG 12-LEAD: ICD-10-PCS | Mod: S$GLB,,, | Performed by: INTERNAL MEDICINE

## 2023-03-10 PROCEDURE — 99999 PR PBB SHADOW E&M-EST. PATIENT-LVL V: CPT | Mod: PBBFAC,,, | Performed by: NURSE PRACTITIONER

## 2023-03-10 PROCEDURE — 99214 PR OFFICE/OUTPT VISIT, EST, LEVL IV, 30-39 MIN: ICD-10-PCS | Mod: S$GLB,,, | Performed by: NURSE PRACTITIONER

## 2023-03-10 PROCEDURE — 71046 XR CHEST PA AND LATERAL: ICD-10-PCS | Mod: 26,,, | Performed by: RADIOLOGY

## 2023-03-10 PROCEDURE — 93005 EKG 12-LEAD: ICD-10-PCS | Mod: S$GLB,,, | Performed by: NURSE PRACTITIONER

## 2023-03-10 PROCEDURE — 93010 ELECTROCARDIOGRAM REPORT: CPT | Mod: S$GLB,,, | Performed by: INTERNAL MEDICINE

## 2023-03-10 PROCEDURE — 99499 UNLISTED E&M SERVICE: CPT | Mod: S$GLB,,, | Performed by: NURSE PRACTITIONER

## 2023-03-10 PROCEDURE — 99499 RISK ADDL DX/OHS AUDIT: ICD-10-PCS | Mod: S$GLB,,, | Performed by: NURSE PRACTITIONER

## 2023-03-10 PROCEDURE — 93005 ELECTROCARDIOGRAM TRACING: CPT | Mod: S$GLB,,, | Performed by: NURSE PRACTITIONER

## 2023-03-10 PROCEDURE — 99999 PR PBB SHADOW E&M-EST. PATIENT-LVL V: ICD-10-PCS | Mod: PBBFAC,,, | Performed by: NURSE PRACTITIONER

## 2023-03-10 PROCEDURE — 99214 OFFICE O/P EST MOD 30 MIN: CPT | Mod: S$GLB,,, | Performed by: NURSE PRACTITIONER

## 2023-03-10 RX ORDER — PREDNISOLONE ACETATE 10 MG/ML
SUSPENSION/ DROPS OPHTHALMIC
COMMUNITY
Start: 2023-03-07

## 2023-03-10 RX ORDER — KETOROLAC TROMETHAMINE 5 MG/ML
SOLUTION OPHTHALMIC
COMMUNITY
Start: 2023-03-07

## 2023-03-10 NOTE — ASSESSMENT & PLAN NOTE
Currently only on Metformin. Last A1C stable. Repeat labs today. She does not follow a diabetic diet.     We will plan to monitor hemoglobin A1c at designated intervals 3 to 6 months.  I recommend ongoing Education for diabetic diet and exercise protocol. We will continue to monitor for side effects. Please be advised of symptoms to monitor for and to notify me immediately if persistent or worsening. Follow up with ophthalmology/optometry and podiatry at least annually.    Lab Results   Component Value Date    HGBA1C 5.9 (H) 05/19/2022     Diabetes Medications             metFORMIN (GLUCOPHAGE) 1000 MG tablet TAKE ONE TABLET BY MOUTH TWICE A DAY WITH MEALS

## 2023-03-10 NOTE — ASSESSMENT & PLAN NOTE
Stable on RA. No acute distress. Primarily using oxygen PRN and at bedtime. Continue inhalers. Monitor oxygen levels. Closely follow up with pulm. ER precautions.

## 2023-03-10 NOTE — PROGRESS NOTES
Assessment/Plan:  Problem List Items Addressed This Visit          Pulmonary    Chronic obstructive pulmonary disease (Chronic)    Overview     Chronic.  Previous HPI:  Control uncertain.  Patient takes albuterol inhaler and also has a nebulizer at home.  Patient sees pulmonology.  Patient is supposed to be on oxygen.  May 2022:  Patient reports to me that she ran out of her oxygen.  She had a change in insurance and the previous company will not continue to provide her oxygen.  She needs a new order.  Her pulmonologist also retired so she needs a referral to Pulmonary.  She continues to use inhaler sparingly.  She continues to smoke.  She has not had the COVID vaccine.  She is due for pneumonia vaccine.         Current Assessment & Plan     Continue inhalers. Monitor oxygen levels. She has supplemental oxygen at home. Uses 3L continuous at night. Continues to smoke; recommend smoking cessation. She is following with pulmonology, advised to closely follow up for further evaluation and management.  ER precautions for severe symptoms.          Chronic hypoxemic respiratory failure (Chronic)    Overview     Chronic.  September 2022: Patient presents to clinic today off of her oxygen.  Patient states that she went without power last night.  She states that her oxygen saturation normally runs in the 80 percentile.  She does have portable oxygen by chooses not to use it.  She is not currently established with Pulmonary and needs a another referral.  Her her previous pulmonologist was in [place].    Previous history:  Uncontrolled.  Patient is normally on continuous oxygen portable 2liters.  She reports that she ran out of oxygen and was unable to get refills.         Current Assessment & Plan     Stable on RA. No acute distress. Primarily using oxygen PRN and at bedtime. Continue inhalers. Monitor oxygen levels. Closely follow up with pulm. ER precautions.             Cardiac/Vascular    Hypertension, essential  (Chronic)    Overview     Initial HPI:  Chronic.  Stable.  Well controlled today.  On amlodipine benazepril combination.  Reports compliance.  No side effects reported.  Denies any chest pain shortness of breath blurred vision.  September 20th 2019Blood pressure stable today.  Patient taking amlodipine benazepril combo.  Reports compliance.  No side effects reported.   May 2022:  CHRONIC. STABLE. BP Reviewed.  Compliant with BP medications. No SE reported.   (-) CP, SOB, palpitations, dizziness, lightheadedness, HA, arm numbness, tingling or weakness, syncope.  Creatinine   Date Value Ref Range Status   11/02/2021 0.8 0.5 - 1.4 mg/dL Final              Current Assessment & Plan     At goal today. Counseled on importance of hypertension disease course, I recommend ongoing Education for DASH-diet and exercise.  Counseled on medication regimen importance of treating high blood pressure.  Please be advised of risk of untreated blood pressure as discussed.  Please advised of ER precautions were given for symptoms of hypertensive urgency and emergency.    Hypertension Medications               amlodipine-benazepril 10-20mg (LOTREL) 10-20 mg per capsule Take 1 capsule by mouth once daily.          Relevant Orders    Ambulatory referral/consult to Cardiology    Hyperlipidemia    Overview     Chronic.  Control is uncertain.  Requesting records.  Checking fasting lipid panel.  Continue Lipitor.  No side effects reported.  Reports compliance.         Current Assessment & Plan     -chronic condition. Currently stable.    -reports compliance with hyperlipidemia treatment as prescribed  -denies any known adverse effects of medications  -recent labs listed below:  Lab Results   Component Value Date    CHOL 127 05/19/2022     Lab Results   Component Value Date    HDL 41 05/19/2022     Lab Results   Component Value Date    LDLCALC 69.4 05/19/2022     Lab Results   Component Value Date    TRIG 83 05/19/2022     Lab Results    Component Value Date    ALT 9 (L) 09/09/2022    AST 14 09/09/2022    ALKPHOS 84 09/09/2022    BILITOT 0.4 09/09/2022     Hyperlipidemia Medications               atorvastatin (LIPITOR) 40 MG tablet Take 1 tablet (40 mg total) by mouth once daily.          Relevant Orders    Ambulatory referral/consult to Cardiology       Endocrine    Type 2 diabetes mellitus with hyperglycemia, without long-term current use of insulin (Chronic)    Overview     07/30/2019 Chronic. Stable. No results found for: LABA1C, HGBA1C Patient taking metforminMed Compliance:  [facility] [place]  Bh-du-hrmiTvietcusc Vaccine?  Unsure  May 2022:   Diabetes Management Status    Statin: Taking  ACE/ARB: Taking    Screening or Prevention Patient's value Goal Complete/Controlled?   HgA1C Testing and Control   Lab Results   Component Value Date    HGBA1C 5.6 11/02/2021      Annually/Less than 8% Yes   Lipid profile : 11/02/2021 Annually Yes   LDL control Lab Results   Component Value Date    LDLCALC 175.4 (H) 11/02/2021    Annually/Less than 100 mg/dl  No   Nephropathy screening Lab Results   Component Value Date    LABMICR 16.0 07/30/2019     Lab Results   Component Value Date    PROTEINUA Negative 08/20/2019     No results found for: UTPCR   Annually No   Blood pressure BP Readings from Last 1 Encounters:   05/19/22 114/80    Less than 140/90 Yes   Dilated retinal exam Most Recent Eye Exam Date: Not Found Annually No   Foot exam   Most Recent Foot Exam Date: Not Found Annually No            Current Assessment & Plan     Currently only on Metformin. Last A1C stable. Repeat labs today. She does not follow a diabetic diet.     We will plan to monitor hemoglobin A1c at designated intervals 3 to 6 months.  I recommend ongoing Education for diabetic diet and exercise protocol. We will continue to monitor for side effects. Please be advised of symptoms to monitor for and to notify me immediately if persistent or  worsening. Follow up with ophthalmology/optometry and podiatry at least annually.    Lab Results   Component Value Date    HGBA1C 5.9 (H) 05/19/2022     Diabetes Medications               metFORMIN (GLUCOPHAGE) 1000 MG tablet TAKE ONE TABLET BY MOUTH TWICE A DAY WITH MEALS                  Other Visit Diagnoses       Preoperative clearance    -  Primary    Relevant Orders    IN OFFICE EKG 12-LEAD (to [facility])    X-Ray Chest PA And Lateral (Completed)          Preop tests ordered- clearance pending   Follow up in about 3 months (around 6/10/2023), or if symptoms worsen or fail to improve, for follow up with PCP.  ER precautions for severe or worsening symptoms.     Keiko Broderick NP  _____________________________________________________________________________________________________________________________________________________    CC: preop clearance    HPI: Patient is a 72-year-old female who presents in clinic today as an established patient here for preop clearance.  The patient is here for a preop clearance to have surgery to have a cataract surgery.  The physician that is performing the surgery is Dr. Devin Chadwick   The surgery is being planned for 3/15/23 and 3/29/23.   The patient states that there has not been previous problems with sedation. She has had prior surgeries in the past with no adverse effects from anesthesia.   She is not currently taking a blood thinner.    She has no history of blood clots or bleeding disorders.   Patient is a current every day smoker. She has a history of COPD and respiratory failure. She has home supplemental oxygen at home. She wears 3L of oxygen. Following closely with pulmonology.    Reportedly has not seen cardiologist since 2017; does not follow regularly. Discussed with her history (HTN, HLD, DM, smoker) recommend routine evaluation by cardiology.     X-Ray Chest PA And Lateral  Narrative: EXAMINATION:  XR CHEST PA AND LATERAL    CLINICAL HISTORY:  Encounter for other  preprocedural examination    TECHNIQUE:  PA and lateral views of the chest were performed.    COMPARISON:  11/14/2022    FINDINGS:  The lungs are clear and free of infiltrate.  No pleural effusion or pneumothorax. The heart is enlarged.  There is tortuosity of the descending thoracic aorta.  Chronic increased peribronchial markings are noted diffusely throughout the chest.  Any aortic stent graft is seen within the abdominal aorta.  Impression: 1.  No acute cardiopulmonary process.    Electronically signed by: Deyvi Galdamez DO  Date:    03/10/2023  Time:    13:19    Past Medical History:  Past Medical History:   Diagnosis Date    AAA (abdominal aortic aneurysm) without rupture     COPD (chronic obstructive pulmonary disease)     Hyperlipidemia     Hypertension     Pulmonary nodule     Thyroid disease      Past Surgical History:   Procedure Laterality Date    ABDOMINAL AORTIC ANEURYSM REPAIR      cardiac catheterization  2017    HYSTERECTOMY      THYROIDECTOMY      TUBAL LIGATION       Review of patient's allergies indicates:   Allergen Reactions    Aspirin, buffered     Codeine     Penicillins Hives    Singulair [montelukast]      Social History     Tobacco Use    Smoking status: Some Days     Packs/day: 0.50     Types: Cigarettes    Smokeless tobacco: Never   Substance Use Topics    Alcohol use: Never    Drug use: Never     Family History   Problem Relation Age of Onset    Colon cancer Neg Hx     Stomach cancer Neg Hx     Esophageal cancer Neg Hx      Current Outpatient Medications on File Prior to Visit   Medication Sig Dispense Refill    albuterol (VENTOLIN HFA) 90 mcg/actuation inhaler INHALE ONE OR TWO PUFFS BY MOUTH EVERY FOUR TO SIX HOURS AS NEEDED FOR WHEEZING 18 g 12    albuterol-ipratropium (DUO-NEB) 2.5 mg-0.5 mg/3 mL nebulizer solution Take 3 mLs by nebulization every 4 (four) hours as needed for Wheezing or Shortness of Breath. 1 each 11    amlodipine-benazepril 10-20mg (LOTREL) 10-20 mg per capsule  Take 1 capsule by mouth once daily. 90 capsule 2    ANORO ELLIPTA 62.5-25 mcg/actuation DsDv INHALE ONE PUFF BY MOUTH DAILY 60 each 11    atorvastatin (LIPITOR) 40 MG tablet Take 1 tablet (40 mg total) by mouth once daily. 90 tablet 4    calcium carbonate (OS-FREDY) 500 mg calcium (1,250 mg) tablet Take 1 tablet (500 mg total) by mouth once daily. 360 tablet 0    calcium-vitamin D3 (OS-FREDY 500 + D3) 500 mg(1,250mg) -200 unit per tablet TAKE ONE TABLET BY MOUTH DAILY  360 tablet 11    cetirizine (ZYRTEC) 10 MG tablet Take 1 tablet (10 mg total) by mouth once daily. for 10 days 30 tablet 11    levothyroxine (SYNTHROID) 50 MCG tablet TAKE ONE TABLET BY MOUTH IN THE MORNING BEFORE BREAKFAST 90 tablet 1    metFORMIN (GLUCOPHAGE) 1000 MG tablet TAKE ONE TABLET BY MOUTH TWICE A DAY WITH MEALS 180 tablet 0    multivitamin capsule Take 1 capsule by mouth once daily.      ofloxacin (OCUFLOX) 0.3 % ophthalmic solution Apply 5 drops into each ear twice daily for 7 days 1 each 0    b complex vitamins capsule Take 1 capsule by mouth once daily.      ketorolac 0.5% (ACULAR) 0.5 % Drop Place into both eyes.      omeprazole (PRILOSEC) 20 MG capsule Take 1 capsule (20 mg total) by mouth once daily. (Patient not taking: Reported on 3/10/2023) 30 capsule 0    ondansetron (ZOFRAN-ODT) 4 MG TbDL Take by mouth.      prednisoLONE acetate (PRED FORTE) 1 % DrpS Place into both eyes.      [DISCONTINUED] doxycycline (MONODOX) 100 MG capsule Take 1 capsule (100 mg total) by mouth every 12 (twelve) hours. (Patient not taking: Reported on 3/10/2023) 20 capsule 0    [DISCONTINUED] methylPREDNISolone (MEDROL DOSEPACK) 4 mg tablet follow package directions 21 tablet 0     No current facility-administered medications on file prior to visit.     Review of Systems   Constitutional:  Positive for fatigue. Negative for appetite change, chills and fever.   HENT:  Negative for congestion, ear pain, rhinorrhea and sore throat.    Eyes:  Negative for visual  "disturbance.   Respiratory:  Positive for shortness of breath (chronic).    Cardiovascular:  Negative for chest pain, palpitations and leg swelling.   Gastrointestinal:  Negative for abdominal pain, diarrhea and vomiting.   Genitourinary:  Negative for difficulty urinating, dysuria and hematuria.   Musculoskeletal:  Negative for arthralgias and myalgias.   Skin:  Negative for rash and wound.   Neurological:  Negative for dizziness and headaches.   Psychiatric/Behavioral:  Negative for behavioral problems. The patient is not nervous/anxious.      Vitals:    03/10/23 1225   BP: 122/80   Pulse: 87   Resp: 18   Temp: 97.9 °F (36.6 °C)   SpO2: (!) 90%   Weight: 68.2 kg (150 lb 4.8 oz)   Height: 5' 1" (1.549 m)     Wt Readings from Last 3 Encounters:   03/10/23 68.2 kg (150 lb 4.8 oz)   11/14/22 69.4 kg (153 lb)   09/30/22 69.5 kg (153 lb 3.5 oz)     Physical Exam  Vitals reviewed.   Constitutional:       General: She is not in acute distress.     Appearance: Normal appearance. She is not ill-appearing.   HENT:      Head: Normocephalic and atraumatic.      Right Ear: External ear normal.      Left Ear: External ear normal.   Eyes:      Extraocular Movements: Extraocular movements intact.      Conjunctiva/sclera: Conjunctivae normal.   Cardiovascular:      Rate and Rhythm: Normal rate.      Heart sounds: Normal heart sounds.   Pulmonary:      Effort: Pulmonary effort is normal. No respiratory distress.      Breath sounds: Normal breath sounds.   Abdominal:      General: Abdomen is flat. There is no distension.   Musculoskeletal:         General: Normal range of motion.      Cervical back: Normal range of motion.   Skin:     General: Skin is warm and dry.      Capillary Refill: Capillary refill takes less than 2 seconds.      Coloration: Skin is not pale.      Findings: No rash.   Neurological:      General: No focal deficit present.      Mental Status: She is alert and oriented to person, place, and time. Mental status is " at baseline.   Psychiatric:         Mood and Affect: Mood normal.         Speech: Speech normal.         Behavior: Behavior normal. Behavior is cooperative.         Thought Content: Thought content normal.         Judgment: Judgment normal.       Health Maintenance   Topic Date Due    Foot Exam  Never done    Mammogram  07/30/2020    DEXA Scan  07/30/2022    Hemoglobin A1c  11/19/2022    Lipid Panel  05/19/2023    Eye Exam  12/28/2023    Low Dose Statin  03/10/2024    TETANUS VACCINE  05/27/2028    Hepatitis C Screening  Completed

## 2023-03-10 NOTE — ASSESSMENT & PLAN NOTE
Continue inhalers. Monitor oxygen levels. She has supplemental oxygen at home. Uses 3L continuous at night. Continues to smoke; recommend smoking cessation. She is following with pulmonology, advised to closely follow up for further evaluation and management.  ER precautions for severe symptoms.

## 2023-03-10 NOTE — ASSESSMENT & PLAN NOTE
At goal today. Counseled on importance of hypertension disease course, I recommend ongoing Education for DASH-diet and exercise.  Counseled on medication regimen importance of treating high blood pressure.  Please be advised of risk of untreated blood pressure as discussed.  Please advised of ER precautions were given for symptoms of hypertensive urgency and emergency.    Hypertension Medications             amlodipine-benazepril 10-20mg (LOTREL) 10-20 mg per capsule Take 1 capsule by mouth once daily.

## 2023-03-10 NOTE — ASSESSMENT & PLAN NOTE
-chronic condition. Currently stable.    -reports compliance with hyperlipidemia treatment as prescribed  -denies any known adverse effects of medications  -recent labs listed below:  Lab Results   Component Value Date    CHOL 127 05/19/2022     Lab Results   Component Value Date    HDL 41 05/19/2022     Lab Results   Component Value Date    LDLCALC 69.4 05/19/2022     Lab Results   Component Value Date    TRIG 83 05/19/2022     Lab Results   Component Value Date    ALT 9 (L) 09/09/2022    AST 14 09/09/2022    ALKPHOS 84 09/09/2022    BILITOT 0.4 09/09/2022     Hyperlipidemia Medications             atorvastatin (LIPITOR) 40 MG tablet Take 1 tablet (40 mg total) by mouth once daily.

## 2023-03-17 ENCOUNTER — TELEPHONE (OUTPATIENT)
Dept: FAMILY MEDICINE | Facility: CLINIC | Age: 73
End: 2023-03-17
Payer: COMMERCIAL

## 2023-03-17 DIAGNOSIS — R71.8 ELEVATED RED BLOOD CELL COUNT: ICD-10-CM

## 2023-03-17 DIAGNOSIS — D58.2 ELEVATED HEMOGLOBIN: Primary | ICD-10-CM

## 2023-03-17 NOTE — TELEPHONE ENCOUNTER
I have signed for the following orders AND/OR meds.  Please call the patient and ask the patient to schedule the testing AND/OR inform about any medications that were sent.   Orders Placed This Encounter   Procedures    Ambulatory referral/consult to Hematology / Oncology     Standing Status:   Future     Standing Expiration Date:   4/17/2024     Referral Priority:   Routine     Referral Type:   Consultation     Referral Reason:   Specialty Services Required     Requested Specialty:   Hematology and Oncology     Number of Visits Requested:   1          [id number] - [place], [place] -  [address] [address]  Phone: [phone] Fax: [phone]

## 2023-03-17 NOTE — TELEPHONE ENCOUNTER
----- Message from Jose Steinberg MD sent at 3/12/2023  9:05 AM CDT -----  Make follow-up lab appointment per recommendation below.  Check to see if patient has seen the results through my chart.  If not then,  #CALL THE PATIENT# to discuss results/see if they have questions and document verification of contact. Make F/U appt if needed. [phone]    #My interpretation that was sent to them through [facility]:  Kami, I have reviewed your recent blood work.     Your complete blood count is abnormal.  I recommend that you follow-up/establish care with Hematology for abnormal blood counts.  Also follow-up with us soon to discuss results.  Your comprehensive metabolic panel shows normal kidney function, liver function and electrolytes.  Your hemoglobin A1c is slightly elevated from previous.  Continue medications for diabetes.  Focus on a low-carbohydrate diet.  This test is gold standard screening test for diabetes.  It is a measures 3 months of your average blood sugar.  =========================  Also please address any outstanding health maintenance that may be due: COVID-19 Vaccine(1) Never done  Foot Exam Never done  Colorectal Cancer Screening due on 08/22/2019  Mammogram due on 07/30/2020  DEXA Scan due on 07/30/2022  Influenza Vaccine(1) due on 09/01/2022   Abdomen , soft, TTP in epigastric region, nondistended , no guarding or rigidity , no masses palpable , normal bowel sounds , Liver and Spleen , no hepatomegaly present , no hepatosplenomegaly , liver nontender , spleen not palpable

## 2023-03-17 NOTE — TELEPHONE ENCOUNTER
Pt aware of test results below. Pt verbalized understanding . [place] sign the referral for hematology , pt agrees .

## 2023-03-22 ENCOUNTER — TELEPHONE (OUTPATIENT)
Dept: HEMATOLOGY/ONCOLOGY | Facility: CLINIC | Age: 73
End: 2023-03-22
Payer: COMMERCIAL

## 2023-03-22 ENCOUNTER — LAB VISIT (OUTPATIENT)
Dept: LAB | Facility: HOSPITAL | Age: 73
End: 2023-03-22
Attending: NURSE PRACTITIONER
Payer: COMMERCIAL

## 2023-03-22 ENCOUNTER — OFFICE VISIT (OUTPATIENT)
Dept: HEMATOLOGY/ONCOLOGY | Facility: CLINIC | Age: 73
End: 2023-03-22
Payer: COMMERCIAL

## 2023-03-22 ENCOUNTER — TELEPHONE (OUTPATIENT)
Dept: FAMILY MEDICINE | Facility: CLINIC | Age: 73
End: 2023-03-22
Payer: COMMERCIAL

## 2023-03-22 VITALS
WEIGHT: 152.19 LBS | BODY MASS INDEX: 28.73 KG/M2 | HEIGHT: 61 IN | OXYGEN SATURATION: 87 % | DIASTOLIC BLOOD PRESSURE: 78 MMHG | SYSTOLIC BLOOD PRESSURE: 130 MMHG | HEART RATE: 76 BPM

## 2023-03-22 DIAGNOSIS — D75.1 SECONDARY POLYCYTHEMIA: ICD-10-CM

## 2023-03-22 DIAGNOSIS — D75.1 SECONDARY POLYCYTHEMIA: Primary | ICD-10-CM

## 2023-03-22 LAB
BASOPHILS # BLD AUTO: 0.08 K/UL (ref 0–0.2)
BASOPHILS NFR BLD: 1 % (ref 0–1.9)
DIFFERENTIAL METHOD: ABNORMAL
EOSINOPHIL # BLD AUTO: 0.1 K/UL (ref 0–0.5)
EOSINOPHIL NFR BLD: 1.3 % (ref 0–8)
ERYTHROCYTE [DISTWIDTH] IN BLOOD BY AUTOMATED COUNT: 15.9 % (ref 11.5–14.5)
HCT VFR BLD AUTO: 54.3 % (ref 37–48.5)
HGB BLD-MCNC: 18.1 G/DL (ref 12–16)
IMM GRANULOCYTES # BLD AUTO: 0.01 K/UL (ref 0–0.04)
IMM GRANULOCYTES NFR BLD AUTO: 0.1 % (ref 0–0.5)
LYMPHOCYTES # BLD AUTO: 2 K/UL (ref 1–4.8)
LYMPHOCYTES NFR BLD: 26 % (ref 18–48)
MCH RBC QN AUTO: 32.3 PG (ref 27–31)
MCHC RBC AUTO-ENTMCNC: 33.3 G/DL (ref 32–36)
MCV RBC AUTO: 97 FL (ref 82–98)
MONOCYTES # BLD AUTO: 0.7 K/UL (ref 0.3–1)
MONOCYTES NFR BLD: 8.8 % (ref 4–15)
NEUTROPHILS # BLD AUTO: 4.8 K/UL (ref 1.8–7.7)
NEUTROPHILS NFR BLD: 62.9 % (ref 38–73)
NRBC BLD-RTO: 0 /100 WBC
PLATELET # BLD AUTO: 161 K/UL (ref 150–450)
PMV BLD AUTO: 10.5 FL (ref 9.2–12.9)
RBC # BLD AUTO: 5.61 M/UL (ref 4–5.4)
WBC # BLD AUTO: 7.7 K/UL (ref 3.9–12.7)

## 2023-03-22 PROCEDURE — 99999 PR PBB SHADOW E&M-EST. PATIENT-LVL IV: ICD-10-PCS | Mod: PBBFAC,,, | Performed by: NURSE PRACTITIONER

## 2023-03-22 PROCEDURE — 36415 COLL VENOUS BLD VENIPUNCTURE: CPT | Mod: PO | Performed by: NURSE PRACTITIONER

## 2023-03-22 PROCEDURE — 99214 PR OFFICE/OUTPT VISIT, EST, LEVL IV, 30-39 MIN: ICD-10-PCS | Mod: S$GLB,,, | Performed by: NURSE PRACTITIONER

## 2023-03-22 PROCEDURE — 99214 OFFICE O/P EST MOD 30 MIN: CPT | Mod: S$GLB,,, | Performed by: NURSE PRACTITIONER

## 2023-03-22 PROCEDURE — 99999 PR PBB SHADOW E&M-EST. PATIENT-LVL IV: CPT | Mod: PBBFAC,,, | Performed by: NURSE PRACTITIONER

## 2023-03-22 PROCEDURE — 85025 COMPLETE CBC W/AUTO DIFF WBC: CPT | Mod: PO | Performed by: NURSE PRACTITIONER

## 2023-03-22 NOTE — TELEPHONE ENCOUNTER
Spoke to patient in reference to Hematology referral from Keiko Broderick NP and need to schedule f/u d/t already being an established patient.

## 2023-03-22 NOTE — PROGRESS NOTES
Subjective:      Patient ID: Kami Higgins is a 72 y.o. female.    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  5/19/2022 with hct 57.7 and found with elevated D-dimer.  CTA done 5/23/2022 - no evidence of PE.  States that she recently received a portable oxygen tank that is able to transport.  She is currently only using her oxygen at night.  States that she does continue to smoke, but not as much (about less then 10 per day vs. 2 packs per day)     She ran out of her oxygen 5/2022 and was unable to get new order due to change in insurance and pulmonologist retiring.        She also has been found with a 4 mm left upper lobe on CT that needs that requires monitoring every 6 months followed by pulmonology.  She is schedule as a new patient visit with Rachana Cronin NP on 8/5/2022 7/30/2019  Mammogram  Left  There is a 9 mm oval mass with circumscribed margins seen in the retroareolar region of the left breast.   Right  There is no evidence of suspicious masses, calcifications, or other abnormal findings.   Impression:  Left  Mass: Left breast 9 mm mass at the retroareolar position. Assessment: 0 - Incomplete. Ultrasound is recommended.   Right  There is no mammographic evidence of malignancy.  BI-RADS Category:   Overall: 0 - Incomplete: Needs Additional Imaging Evaluation   Recommendation:  Breast ultrasound is recommended.  8/5/2019 US left breast   Impression:  Left  Mass: Left breast 5 mm x 5 mm x 4 mm mass. Assessment: 3 - Probably benign. Short Interval Follow-Up in 6 Months is recommended.   BI-RADS Category:   Left: 3 - Probably Benign  Overall: 3 - Probably Benign  Recommendation:  Short interval follow-up is recommended in 6 Months     8/2019 Positive Cologuard testing     Interval History  9/30/2022 Presents today to  see if phlebotomy is needed for secondary polycythemia due to smoking.  States that she did not do a mammogram because she does not need to.  Also states that she does not want to do colonoscopy. She alos told me that if she was found with cancer she would not want to be treated.  She states that she continues to smoke with no desire to quit.      Interval History  3/22/2023 Presents today in clinic for evaluation of recent labs with secondary polycythemia.  Has canceled past 2 visit and has been lost to f/u.  Currently with hct 57.8.  Has no complaints today.  States that she is working on reducing cigarette smoking. Currently O2 is 87% on RA.  Denies sob or chest pain.  States that she does not have a portable O2 tank.  Wears O2 at home at 3 liters.       Social History     Socioeconomic History    Marital status:    Tobacco Use    Smoking status: Some Days     Packs/day: 0.50     Types: Cigarettes    Smokeless tobacco: Never   Substance and Sexual Activity    Alcohol use: Never    Drug use: Never    Sexual activity: Not Currently     Partners: Male       Family History   Problem Relation Age of Onset    Colon cancer Neg Hx     Stomach cancer Neg Hx     Esophageal cancer Neg Hx        Past Surgical History:   Procedure Laterality Date    ABDOMINAL AORTIC ANEURYSM REPAIR      cardiac catheterization  2017    HYSTERECTOMY      THYROIDECTOMY      TUBAL LIGATION         Past Medical History:   Diagnosis Date    AAA (abdominal aortic aneurysm) without rupture     COPD (chronic obstructive pulmonary disease)     Hyperlipidemia     Hypertension     Pulmonary nodule     Thyroid disease        Review of Systems   Constitutional: Negative.    HENT: Negative.     Respiratory:  Negative for chest tightness and shortness of breath.    Cardiovascular:  Negative for chest pain and palpitations.   Gastrointestinal: Negative.    Endocrine: Negative.    Genitourinary: Negative.    Musculoskeletal: Negative.    Skin:  Negative.    Allergic/Immunologic: Negative.    Neurological: Negative.    Hematological: Negative.    Psychiatric/Behavioral: Negative.          Medication List with Changes/Refills   Current Medications    ALBUTEROL (VENTOLIN HFA) 90 MCG/ACTUATION INHALER    INHALE ONE OR TWO PUFFS BY MOUTH EVERY FOUR TO SIX HOURS AS NEEDED FOR WHEEZING    ALBUTEROL-IPRATROPIUM (DUO-NEB) 2.5 MG-0.5 MG/3 ML NEBULIZER SOLUTION    Take 3 mLs by nebulization every 4 (four) hours as needed for Wheezing or Shortness of Breath.    AMLODIPINE-BENAZEPRIL 10-20MG (LOTREL) 10-20 MG PER CAPSULE    Take 1 capsule by mouth once daily.    ANORO ELLIPTA 62.5-25 MCG/ACTUATION DSDV    INHALE ONE PUFF BY MOUTH DAILY    ATORVASTATIN (LIPITOR) 40 MG TABLET    Take 1 tablet (40 mg total) by mouth once daily.    B COMPLEX VITAMINS CAPSULE    Take 1 capsule by mouth once daily.    CALCIUM CARBONATE (OS-FREDY) 500 MG CALCIUM (1,250 MG) TABLET    Take 1 tablet (500 mg total) by mouth once daily.    CALCIUM-VITAMIN D3 (OS-FREDY 500 + D3) 500 MG(1,250MG) -200 UNIT PER TABLET    TAKE ONE TABLET BY MOUTH DAILY     CETIRIZINE (ZYRTEC) 10 MG TABLET    Take 1 tablet (10 mg total) by mouth once daily. for 10 days    KETOROLAC 0.5% (ACULAR) 0.5 % DROP    Place into both eyes.    LEVOTHYROXINE (SYNTHROID) 50 MCG TABLET    TAKE ONE TABLET BY MOUTH IN THE MORNING BEFORE BREAKFAST    METFORMIN (GLUCOPHAGE) 1000 MG TABLET    TAKE ONE TABLET BY MOUTH TWICE A DAY WITH MEALS    MULTIVITAMIN CAPSULE    Take 1 capsule by mouth once daily.    OFLOXACIN (OCUFLOX) 0.3 % OPHTHALMIC SOLUTION    Apply 5 drops into each ear twice daily for 7 days    OMEPRAZOLE (PRILOSEC) 20 MG CAPSULE    Take 1 capsule (20 mg total) by mouth once daily.    ONDANSETRON (ZOFRAN-ODT) 4 MG TBDL    Take by mouth.    PREDNISOLONE ACETATE (PRED FORTE) 1 % DRPS    Place into both eyes.        Objective:     Vitals:    03/22/23 1331   BP: 130/78   Pulse: 76       Physical Exam  Vitals reviewed.    Constitutional:       Appearance: Normal appearance.   HENT:      Head: Normocephalic and atraumatic.      Right Ear: External ear normal.      Left Ear: External ear normal.   Cardiovascular:      Rate and Rhythm: Normal rate and regular rhythm.      Heart sounds: Normal heart sounds, S1 normal and S2 normal.   Pulmonary:      Effort: Pulmonary effort is normal.      Breath sounds: Decreased air movement present.   Abdominal:      General: There is no distension.   Musculoskeletal:         General: Normal range of motion.      Cervical back: Normal range of motion.   Skin:     General: Skin is warm and dry.   Neurological:      General: No focal deficit present.      Mental Status: She is alert and oriented to person, place, and time.   Psychiatric:         Attention and Perception: Attention and perception normal.         Mood and Affect: Mood and affect normal.         Speech: Speech normal.         Behavior: Behavior normal. Behavior is cooperative.         Thought Content: Thought content normal.         Cognition and Memory: Cognition and memory normal.         Judgment: Judgment normal.       Assessment:     Problem List Items Addressed This Visit          Oncology    Secondary polycythemia - Primary    Relevant Orders    CBC Auto Differential (Completed)    CBC Auto Differential       Lab Results   Component Value Date    WBC 7.70 03/22/2023    RBC 5.61 (H) 03/22/2023    HGB 18.1 (H) 03/22/2023    HCT 54.3 (H) 03/22/2023    MCV 97 03/22/2023    MCH 32.3 (H) 03/22/2023    MCHC 33.3 03/22/2023    RDW 15.9 (H) 03/22/2023     03/22/2023    MPV 10.5 03/22/2023    GRAN 4.8 03/22/2023    GRAN 62.9 03/22/2023    LYMPH 2.0 03/22/2023    LYMPH 26.0 03/22/2023    MONO 0.7 03/22/2023    MONO 8.8 03/22/2023    EOS 0.1 03/22/2023    BASO 0.08 03/22/2023    EOSINOPHIL 1.3 03/22/2023    BASOPHIL 1.0 03/22/2023      Lab Results   Component Value Date     03/10/2023    K 4.9 03/10/2023     03/10/2023     CO2 25 03/10/2023    BUN 23 03/10/2023    CREATININE 1.0 03/10/2023    CALCIUM 9.6 03/10/2023    ANIONGAP 12 03/10/2023    ESTGFRAFRICA >60.0 06/22/2022    EGFRNONAA >60.0 06/22/2022     Lab Results   Component Value Date    ALT 10 03/10/2023    AST 15 03/10/2023    ALKPHOS 90 03/10/2023    BILITOT 0.7 03/10/2023     Lab Results   Component Value Date    WBC 7.70 03/22/2023    HGB 18.1 (H) 03/22/2023    HCT 54.3 (H) 03/22/2023    MCV 97 03/22/2023     03/22/2023         Plan:   Secondary polycythemia  -     CBC Auto Differential; Future; Expected date: 03/22/2023  -     CBC Auto Differential; Standing       Patient states that she does not want to proceed with colonoscopy.  She is aware that this could be a cancer.  She previously agreed to mammogram; however now states that she does not need it.  She states that she does not want chemotherapy if found with cancer.       Patient is with secondary polycythemia due to hypoxia.  Encouraged to wear oxygen continuously. She states that she uses O2 3 lters.  Advised not to exceed 5 liters O2.  Patient verbalized understanding.  She will f/u with pulmonology.  Recommended smoking cessation and offered initiation of smoking cessation program.  Patient states that she will continue working on this alone for now. States that she doesn't have time.         Previously recommended patient to take ASA 81 mg PO daily.  She states that she was told not to take ASA by Dr. Kelsey, vascular surgeon at [facility] so does not want to do this.         Repeat CBC today - Hct < 55% .  No need for phlebotomy currently.       Monthly cbcs x 3. F/u in 3 month with cbc - possible phlebotomy for hct >55%      Med Onc Chart Routing      Follow up with physician    Follow up with MALIHA . F/u in 3 months with cbc - possible phlebotomy   Infusion scheduling note n/a   Injection scheduling note n/a   Labs   Scheduling:  Preferred lab:  Lab interval:  Monthly cbc's x 3 in [place] with f/u with 3rd cbc    Imaging   None   Pharmacy appointment No pharmacy appointment needed      Other referrals  No additional referrals needed            Collaborating Provider:  Dr. Laron Crawford    Thank You,  Marcelo Ferguson, RONNIEP-C  Hematology Oncology

## 2023-03-22 NOTE — TELEPHONE ENCOUNTER
----- Message from Cate Ferguson NP sent at 3/22/2023  3:40 PM CDT -----  Please let her know that her hct is not >55% so no phlebotomy is needed currently.

## 2023-03-22 NOTE — TELEPHONE ENCOUNTER
Pt is seeing hem/oc today, requesting Dr. Steinberg put in an order for her portable oxygen. Please advise if patient needs an appointment

## 2023-03-23 ENCOUNTER — TELEPHONE (OUTPATIENT)
Dept: FAMILY MEDICINE | Facility: CLINIC | Age: 73
End: 2023-03-23
Payer: COMMERCIAL

## 2023-03-23 DIAGNOSIS — J44.9 CHRONIC OBSTRUCTIVE PULMONARY DISEASE, UNSPECIFIED COPD TYPE: Primary | ICD-10-CM

## 2023-03-23 NOTE — TELEPHONE ENCOUNTER
----- Message from Marychuy Starr sent at 3/23/2023  2:19 PM CDT -----  Regarding: please enter pulmonary referral  There is a cardiology referral in patient's chart, there is not a pulmonary referral in patient's chart at this time. Please see note below and enter pulmonary referral, pt is scheduled for pulmonary testing.    Jamila Paredes MA  Marychuy Starr  Caller: Unspecified (Yesterday,  1:36 PM)  Please assist in scheduling appointment with pulmonology and Dr. Steinberg.         Previous Messages    Patient Calls  (Newest Message First)  Jamila Paredes MA  You 21 hours ago (4:26 PM)    [place]  Please assist in scheduling appointment with pulmonology and Dr. Steinberg.      MD Tamica Cox, MARIA LUISA; Maryan Garcia Staff 23 hours ago (2:43 PM)      Patient needs to follow-up with Pulmonary.  She also needs an appointment with me to discuss.      Note

## 2023-03-23 NOTE — TELEPHONE ENCOUNTER
Patient is currently being seen at [facility] pulmonology by Heather Enciso PA-C

## 2023-03-28 ENCOUNTER — TELEPHONE (OUTPATIENT)
Dept: PULMONOLOGY | Facility: CLINIC | Age: 73
End: 2023-03-28
Payer: COMMERCIAL

## 2023-03-28 NOTE — TELEPHONE ENCOUNTER
Called patient regarding appointment, patient unable to make appointment. Appointment rescheduled.

## 2023-03-29 ENCOUNTER — OFFICE VISIT (OUTPATIENT)
Dept: FAMILY MEDICINE | Facility: CLINIC | Age: 73
End: 2023-03-29
Payer: COMMERCIAL

## 2023-03-29 VITALS
WEIGHT: 154 LBS | TEMPERATURE: 97 F | HEART RATE: 85 BPM | OXYGEN SATURATION: 88 % | SYSTOLIC BLOOD PRESSURE: 134 MMHG | HEIGHT: 64 IN | BODY MASS INDEX: 26.29 KG/M2 | DIASTOLIC BLOOD PRESSURE: 72 MMHG

## 2023-03-29 DIAGNOSIS — H66.001 NON-RECURRENT ACUTE SUPPURATIVE OTITIS MEDIA OF RIGHT EAR WITHOUT SPONTANEOUS RUPTURE OF TYMPANIC MEMBRANE: Primary | ICD-10-CM

## 2023-03-29 PROCEDURE — 99999 PR PBB SHADOW E&M-EST. PATIENT-LVL V: ICD-10-PCS | Mod: PBBFAC,,, | Performed by: NURSE PRACTITIONER

## 2023-03-29 PROCEDURE — 99213 OFFICE O/P EST LOW 20 MIN: CPT | Mod: S$GLB,,, | Performed by: NURSE PRACTITIONER

## 2023-03-29 PROCEDURE — 99999 PR PBB SHADOW E&M-EST. PATIENT-LVL V: CPT | Mod: PBBFAC,,, | Performed by: NURSE PRACTITIONER

## 2023-03-29 PROCEDURE — 99213 PR OFFICE/OUTPT VISIT, EST, LEVL III, 20-29 MIN: ICD-10-PCS | Mod: S$GLB,,, | Performed by: NURSE PRACTITIONER

## 2023-03-29 RX ORDER — CEFDINIR 300 MG/1
300 CAPSULE ORAL 2 TIMES DAILY
Qty: 20 CAPSULE | Refills: 0 | Status: SHIPPED | OUTPATIENT
Start: 2023-03-29 | End: 2024-01-10 | Stop reason: SDUPTHER

## 2023-03-29 NOTE — PROGRESS NOTES
Subjective:       Patient ID: Kami Higgins is a 72 y.o. female.    Chief Complaint: Otalgia    Otalgia   There is pain in both ears. This is a new problem. The current episode started in the past 7 days. The problem occurs constantly. The problem has been rapidly worsening. There has been no fever. Pertinent negatives include no abdominal pain, coughing, diarrhea, headaches, rash, sore throat or vomiting. She has tried nothing for the symptoms.     Review of Systems   Constitutional:  Negative for fatigue, fever and unexpected weight change.   HENT:  Positive for ear pain. Negative for sore throat.    Eyes:  Negative for pain and visual disturbance.   Respiratory:  Negative for cough and shortness of breath.    Cardiovascular:  Negative for chest pain and palpitations.   Gastrointestinal:  Negative for abdominal pain, diarrhea and vomiting.   Musculoskeletal:  Negative for arthralgias and myalgias.   Skin:  Negative for color change and rash.   Neurological:  Negative for dizziness and headaches.   Psychiatric/Behavioral:  Negative for dysphoric mood and sleep disturbance. The patient is not nervous/anxious.      Vitals:    03/29/23 1123   BP: 134/72   Pulse: 85   Temp: 97.4 °F (36.3 °C)       Objective:     Current Outpatient Medications   Medication Sig Dispense Refill    albuterol (VENTOLIN HFA) 90 mcg/actuation inhaler INHALE ONE OR TWO PUFFS BY MOUTH EVERY FOUR TO SIX HOURS AS NEEDED FOR WHEEZING 18 g 12    albuterol-ipratropium (DUO-NEB) 2.5 mg-0.5 mg/3 mL nebulizer solution Take 3 mLs by nebulization every 4 (four) hours as needed for Wheezing or Shortness of Breath. 1 each 11    amlodipine-benazepril 10-20mg (LOTREL) 10-20 mg per capsule Take 1 capsule by mouth once daily. 90 capsule 2    ANORO ELLIPTA 62.5-25 mcg/actuation DsDv INHALE ONE PUFF BY MOUTH DAILY 60 each 11    atorvastatin (LIPITOR) 40 MG tablet Take 1 tablet (40 mg total) by mouth once daily. 90 tablet 4    b complex vitamins capsule Take 1  capsule by mouth once daily.      ketorolac 0.5% (ACULAR) 0.5 % Drop Place into both eyes.      levothyroxine (SYNTHROID) 50 MCG tablet TAKE ONE TABLET BY MOUTH IN THE MORNING BEFORE BREAKFAST 90 tablet 1    metFORMIN (GLUCOPHAGE) 1000 MG tablet TAKE ONE TABLET BY MOUTH TWICE A DAY WITH MEALS 180 tablet 0    multivitamin capsule Take 1 capsule by mouth once daily.      ofloxacin (OCUFLOX) 0.3 % ophthalmic solution Apply 5 drops into each ear twice daily for 7 days 1 each 0    ondansetron (ZOFRAN-ODT) 4 MG TbDL Take by mouth.      prednisoLONE acetate (PRED FORTE) 1 % DrpS Place into both eyes.      calcium carbonate (OS-FREDY) 500 mg calcium (1,250 mg) tablet Take 1 tablet (500 mg total) by mouth once daily. 360 tablet 0    calcium-vitamin D3 (OS-FREDY 500 + D3) 500 mg(1,250mg) -200 unit per tablet TAKE ONE TABLET BY MOUTH DAILY  360 tablet 11    cefdinir (OMNICEF) 300 MG capsule Take 1 capsule (300 mg total) by mouth 2 (two) times daily. for 10 days 20 capsule 0    cetirizine (ZYRTEC) 10 MG tablet Take 1 tablet (10 mg total) by mouth once daily. for 10 days 30 tablet 11    omeprazole (PRILOSEC) 20 MG capsule Take 1 capsule (20 mg total) by mouth once daily. (Patient not taking: Reported on 3/10/2023) 30 capsule 0     No current facility-administered medications for this visit.       Physical Exam  Vitals and nursing note reviewed.   Constitutional:       General: She is not in acute distress.     Appearance: She is well-developed.   HENT:      Head: Normocephalic and atraumatic.      Right Ear: A middle ear effusion is present. Tympanic membrane is injected, erythematous and bulging.      Left Ear: Tympanic membrane is injected.      Mouth/Throat:      Pharynx: Oropharynx is clear.   Eyes:      Pupils: Pupils are equal, round, and reactive to light.   Cardiovascular:      Rate and Rhythm: Normal rate and regular rhythm.   Pulmonary:      Effort: Pulmonary effort is normal.      Breath sounds: Normal breath sounds.    Musculoskeletal:         General: Normal range of motion.      Cervical back: Normal range of motion and neck supple.   Skin:     General: Skin is warm and dry.      Findings: No rash.   Neurological:      Mental Status: She is alert and oriented to person, place, and time.   Psychiatric:         Judgment: Judgment normal.       Assessment:       1. Non-recurrent acute suppurative otitis media of right ear without spontaneous rupture of tympanic membrane          Plan:   Non-recurrent acute suppurative otitis media of right ear without spontaneous rupture of tympanic membrane    Other orders  -     cefdinir (OMNICEF) 300 MG capsule; Take 1 capsule (300 mg total) by mouth 2 (two) times daily. for 10 days  Dispense: 20 capsule; Refill: 0        No follow-ups on file.    There are no Patient Instructions on file for this visit.

## 2023-04-03 ENCOUNTER — TELEPHONE (OUTPATIENT)
Dept: PULMONOLOGY | Facility: CLINIC | Age: 73
End: 2023-04-03
Payer: MEDICARE

## 2023-04-03 ENCOUNTER — CLINICAL SUPPORT (OUTPATIENT)
Dept: PULMONOLOGY | Facility: CLINIC | Age: 73
End: 2023-04-03
Payer: MEDICARE

## 2023-04-03 VITALS — WEIGHT: 151.25 LBS | HEIGHT: 64 IN | BODY MASS INDEX: 25.82 KG/M2

## 2023-04-03 DIAGNOSIS — J44.9 CHRONIC OBSTRUCTIVE PULMONARY DISEASE, UNSPECIFIED COPD TYPE: ICD-10-CM

## 2023-04-03 PROCEDURE — 94618 PULMONARY STRESS TESTING: ICD-10-PCS | Mod: 26,S$PBB,, | Performed by: INTERNAL MEDICINE

## 2023-04-03 PROCEDURE — 99999 PR PBB SHADOW E&M-EST. PATIENT-LVL I: CPT | Mod: PBBFAC,,,

## 2023-04-03 PROCEDURE — 94618 PULMONARY STRESS TESTING: CPT | Mod: PBBFAC

## 2023-04-03 PROCEDURE — 99999 PR PBB SHADOW E&M-EST. PATIENT-LVL I: ICD-10-PCS | Mod: PBBFAC,,,

## 2023-04-03 PROCEDURE — 94618 PULMONARY STRESS TESTING: CPT | Mod: 26,S$PBB,, | Performed by: INTERNAL MEDICINE

## 2023-04-03 PROCEDURE — 99211 OFF/OP EST MAY X REQ PHY/QHP: CPT | Mod: PBBFAC

## 2023-04-03 NOTE — PROCEDURES
"[facility] - Pulmonary Function  Six Minute Walk     SUMMARY     Ordering Provider: TORIN Enciso   Interpreting Provider: MD Sandhya  Performing nurse/tech/RT: SANDRA Lema CRT  Diagnosis: COPD  Height: 5' 4" (162.6 cm)  Weight: 68.6 kg (151 lb 3.8 oz)  BMI (Calculated): 25.9   Patient Race:             Phase Oxygen Assessment Supplemental O2 Heart   Rate Blood Pressure Jose Dyspnea Scale Rating   Resting 83 % (Placed on 2L, recovered to 91%) Room Air 78 bpm 125/58 1   Exercise        Minute        1 83 % (increased to 3L recovered to 88, increased to 4L recovered to 92%) 2 L/M 86 bpm     2 93 % 4 L/M 82 bpm     3 91 % 4 L/M 88 bpm     4 88 % (increased to 6L recovereed to 91%) 4 L/M 96 bpm     5 93 % 6 L/M 92 bpm     6  92 % 6 L/M 90 bpm 122/61 2   Recovery        Minute        1 94 % 6 L/M 75 bpm     2 95 % 6 L/M 76 bpm     3 97 % 6 L/M 74 bpm     4 98 % 6 L/M 77 bpm 113/64 0     Six Minute Walk Summary  6MWT Status: completed without stopping  Patient Reported: Dyspnea     Interpretation:  Did the patient stop or pause?: No                                         Total Time Walked (Calculated): 360 seconds  Final Partial Lap Distance (feet): 50 feet  Total Distance Meters (Calculated): 259.08 meters  Predicted Distance Meters (Calculated): 448.39 meters  Percentage of Predicted (Calculated): 57.78  Peak VO2 (Calculated): 11.75  Mets: 3.36  Has The Patient Had a Previous Six Minute Walk Test?: No       Previous 6MWT Results  Has The Patient Had a Previous Six Minute Walk Test?: No    "

## 2023-04-03 NOTE — TELEPHONE ENCOUNTER
Patient attended appt----- Message from Kimberly Garrido sent at 4/3/2023  3:28 PM CDT -----  Contact: Kami  patient stated running about 15 minutes late went to wrong location appointment 04/03/2023 @ 3:30 pm In route   Brittany Zaidi

## 2023-04-04 ENCOUNTER — OFFICE VISIT (OUTPATIENT)
Dept: PULMONOLOGY | Facility: CLINIC | Age: 73
End: 2023-04-04
Payer: MEDICARE

## 2023-04-04 DIAGNOSIS — R91.1 PULMONARY NODULE: ICD-10-CM

## 2023-04-04 DIAGNOSIS — J96.11 CHRONIC RESPIRATORY FAILURE WITH HYPOXIA: Primary | ICD-10-CM

## 2023-04-04 DIAGNOSIS — I10 HYPERTENSION, ESSENTIAL: Chronic | ICD-10-CM

## 2023-04-04 DIAGNOSIS — J96.11 CHRONIC HYPOXEMIC RESPIRATORY FAILURE: Chronic | ICD-10-CM

## 2023-04-04 DIAGNOSIS — J44.9 CHRONIC OBSTRUCTIVE PULMONARY DISEASE, UNSPECIFIED COPD TYPE: Chronic | ICD-10-CM

## 2023-04-04 DIAGNOSIS — F17.210 CIGARETTE SMOKER: ICD-10-CM

## 2023-04-04 PROCEDURE — 99499 RISK ADDL DX/OHS AUDIT: ICD-10-PCS | Mod: 95,,, | Performed by: PHYSICIAN ASSISTANT

## 2023-04-04 PROCEDURE — 99499 UNLISTED E&M SERVICE: CPT | Mod: 95,,, | Performed by: PHYSICIAN ASSISTANT

## 2023-04-04 PROCEDURE — 99214 OFFICE O/P EST MOD 30 MIN: CPT | Mod: 95,,, | Performed by: PHYSICIAN ASSISTANT

## 2023-04-04 PROCEDURE — 99214 PR OFFICE/OUTPT VISIT, EST, LEVL IV, 30-39 MIN: ICD-10-PCS | Mod: 95,,, | Performed by: PHYSICIAN ASSISTANT
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
## 2023-04-05 VITALS — HEIGHT: 64 IN | BODY MASS INDEX: 25.78 KG/M2 | WEIGHT: 151 LBS

## 2023-04-05 NOTE — ASSESSMENT & PLAN NOTE
Anoro daily  Albuterol prn  Recommend Trelegy or adding LAMA, patient states she would like to stay on Anoro  Roel 33% FEV1, decline from last PFT 2019 44%  Stressed smoking cessation, daily exercise  Declines flu vaccine last visit  UTD on pneumonia vaccine  Oxygen order today  Pulmonary Disease Management

## 2023-04-28 ENCOUNTER — PATIENT MESSAGE (OUTPATIENT)
Dept: HEMATOLOGY/ONCOLOGY | Facility: CLINIC | Age: 73
End: 2023-04-28
Payer: MEDICARE

## 2023-04-28 ENCOUNTER — LAB VISIT (OUTPATIENT)
Dept: LAB | Facility: HOSPITAL | Age: 73
End: 2023-04-28
Attending: NURSE PRACTITIONER
Payer: MEDICARE

## 2023-04-28 DIAGNOSIS — D75.1 SECONDARY POLYCYTHEMIA: ICD-10-CM

## 2023-04-28 LAB
BASOPHILS # BLD AUTO: 0.08 K/UL (ref 0–0.2)
BASOPHILS NFR BLD: 0.9 % (ref 0–1.9)
DIFFERENTIAL METHOD: ABNORMAL
EOSINOPHIL # BLD AUTO: 0.1 K/UL (ref 0–0.5)
EOSINOPHIL NFR BLD: 1.6 % (ref 0–8)
ERYTHROCYTE [DISTWIDTH] IN BLOOD BY AUTOMATED COUNT: 15.5 % (ref 11.5–14.5)
HCT VFR BLD AUTO: 52.6 % (ref 37–48.5)
HGB BLD-MCNC: 17.2 G/DL (ref 12–16)
IMM GRANULOCYTES # BLD AUTO: 0.02 K/UL (ref 0–0.04)
IMM GRANULOCYTES NFR BLD AUTO: 0.2 % (ref 0–0.5)
LYMPHOCYTES # BLD AUTO: 2.1 K/UL (ref 1–4.8)
LYMPHOCYTES NFR BLD: 24.8 % (ref 18–48)
MCH RBC QN AUTO: 32 PG (ref 27–31)
MCHC RBC AUTO-ENTMCNC: 32.7 G/DL (ref 32–36)
MCV RBC AUTO: 98 FL (ref 82–98)
MONOCYTES # BLD AUTO: 0.7 K/UL (ref 0.3–1)
MONOCYTES NFR BLD: 8.2 % (ref 4–15)
NEUTROPHILS # BLD AUTO: 5.5 K/UL (ref 1.8–7.7)
NEUTROPHILS NFR BLD: 64.5 % (ref 38–73)
NRBC BLD-RTO: 0 /100 WBC
PLATELET # BLD AUTO: 172 K/UL (ref 150–450)
PMV BLD AUTO: 10.1 FL (ref 9.2–12.9)
RBC # BLD AUTO: 5.37 M/UL (ref 4–5.4)
WBC # BLD AUTO: 8.5 K/UL (ref 3.9–12.7)

## 2023-04-28 PROCEDURE — 85025 COMPLETE CBC W/AUTO DIFF WBC: CPT | Mod: PO | Performed by: NURSE PRACTITIONER

## 2023-04-28 PROCEDURE — 36415 COLL VENOUS BLD VENIPUNCTURE: CPT | Mod: PO | Performed by: NURSE PRACTITIONER

## 2023-04-29 DIAGNOSIS — E03.9 HYPOTHYROIDISM, UNSPECIFIED TYPE: ICD-10-CM

## 2023-04-29 DIAGNOSIS — E11.65 UNCONTROLLED TYPE 2 DIABETES MELLITUS WITH HYPERGLYCEMIA: ICD-10-CM

## 2023-04-29 DIAGNOSIS — J44.9 CHRONIC OBSTRUCTIVE PULMONARY DISEASE, UNSPECIFIED COPD TYPE: ICD-10-CM

## 2023-04-29 DIAGNOSIS — Z79.899 ENCOUNTER FOR LONG-TERM (CURRENT) USE OF MEDICATIONS: ICD-10-CM

## 2023-04-29 NOTE — TELEPHONE ENCOUNTER
Care Due:                  Date            Visit Type   Department     Provider  --------------------------------------------------------------------------------                                EP -                              PRIMARY      [facility]  Last Visit: 09-      CARE ([facility])   MEDICINE       Jose Steinberg  Next Visit: None Scheduled  None         None Found                                                            Last  Test          Frequency    Reason                     Performed    Due Date  --------------------------------------------------------------------------------    Lipid Panel.  12 months..  atorvastatin.............  05-   05-    [facility] Embedded Care Due Messages. Reference number: [id number].   4/29/2023 9:08:17 AM CDT

## 2023-04-30 RX ORDER — UMECLIDINIUM BROMIDE AND VILANTEROL TRIFENATATE 62.5; 25 UG/1; UG/1
POWDER RESPIRATORY (INHALATION)
Qty: 180 EACH | Refills: 1 | Status: SHIPPED | OUTPATIENT
Start: 2023-04-30 | End: 2024-01-02 | Stop reason: SDUPTHER

## 2023-04-30 RX ORDER — ALBUTEROL SULFATE 90 UG/1
AEROSOL, METERED RESPIRATORY (INHALATION)
Qty: 54 G | Refills: 1 | Status: SHIPPED | OUTPATIENT
Start: 2023-04-30 | End: 2024-02-19 | Stop reason: SDUPTHER

## 2023-04-30 RX ORDER — LEVOTHYROXINE SODIUM 50 UG/1
TABLET ORAL
Qty: 90 TABLET | Refills: 0 | Status: SHIPPED | OUTPATIENT
Start: 2023-04-30 | End: 2023-08-14

## 2023-04-30 RX ORDER — METFORMIN HYDROCHLORIDE 1000 MG/1
TABLET ORAL
Qty: 180 TABLET | Refills: 0 | Status: SHIPPED | OUTPATIENT
Start: 2023-04-30 | End: 2023-08-14

## 2023-04-30 NOTE — TELEPHONE ENCOUNTER
Refill Decision Note   Kami Higgins  is requesting a refill authorization.  Brief Assessment and Rationale for Refill:  Approve     Medication Therapy Plan:       Medication Reconciliation Completed: No   Comments:     Provider Staff:     Action is required for this patient.   Please see care gap opportunities below in Care Due Message.     Thanks!  [facility]     Appointments      Date Provider   Last Visit   9/12/2022 Jose Steinberg MD   Next Visit   Visit date not found Jose Steinberg MD     Note composed:11:21 AM 04/30/2023           Note composed:11:21 AM 04/30/2023

## 2023-05-11 ENCOUNTER — OFFICE VISIT (OUTPATIENT)
Dept: CARDIOLOGY | Facility: CLINIC | Age: 73
End: 2023-05-11
Payer: MEDICARE

## 2023-05-11 VITALS
HEART RATE: 92 BPM | DIASTOLIC BLOOD PRESSURE: 60 MMHG | HEIGHT: 64 IN | SYSTOLIC BLOOD PRESSURE: 110 MMHG | WEIGHT: 142.5 LBS | BODY MASS INDEX: 24.33 KG/M2 | OXYGEN SATURATION: 90 %

## 2023-05-11 DIAGNOSIS — E78.2 MIXED HYPERLIPIDEMIA: ICD-10-CM

## 2023-05-11 DIAGNOSIS — E78.00 PURE HYPERCHOLESTEROLEMIA: ICD-10-CM

## 2023-05-11 DIAGNOSIS — I71.43 INFRARENAL ABDOMINAL AORTIC ANEURYSM (AAA) WITHOUT RUPTURE: Chronic | ICD-10-CM

## 2023-05-11 DIAGNOSIS — I10 HYPERTENSION, ESSENTIAL: Primary | Chronic | ICD-10-CM

## 2023-05-11 DIAGNOSIS — Z76.89 ESTABLISHING CARE WITH NEW DOCTOR, ENCOUNTER FOR: ICD-10-CM

## 2023-05-11 PROCEDURE — 99214 OFFICE O/P EST MOD 30 MIN: CPT | Mod: PBBFAC,PO | Performed by: INTERNAL MEDICINE

## 2023-05-11 PROCEDURE — 99204 OFFICE O/P NEW MOD 45 MIN: CPT | Mod: S$PBB,,, | Performed by: INTERNAL MEDICINE

## 2023-05-11 PROCEDURE — 99999 PR PBB SHADOW E&M-EST. PATIENT-LVL IV: CPT | Mod: PBBFAC,,, | Performed by: INTERNAL MEDICINE

## 2023-05-11 PROCEDURE — 99204 PR OFFICE/OUTPT VISIT, NEW, LEVL IV, 45-59 MIN: ICD-10-PCS | Mod: S$PBB,,, | Performed by: INTERNAL MEDICINE

## 2023-05-11 PROCEDURE — 99999 PR PBB SHADOW E&M-EST. PATIENT-LVL IV: ICD-10-PCS | Mod: PBBFAC,,, | Performed by: INTERNAL MEDICINE
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
## 2023-05-23 ENCOUNTER — TELEPHONE (OUTPATIENT)
Dept: CARDIOLOGY | Facility: HOSPITAL | Age: 73
End: 2023-05-23
Payer: MEDICARE

## 2023-05-31 ENCOUNTER — PATIENT MESSAGE (OUTPATIENT)
Dept: CARDIOLOGY | Facility: HOSPITAL | Age: 73
End: 2023-05-31
Payer: MEDICARE

## 2023-06-14 ENCOUNTER — TELEPHONE (OUTPATIENT)
Dept: CARDIOLOGY | Facility: HOSPITAL | Age: 73
End: 2023-06-14
Payer: MEDICARE

## 2023-07-07 ENCOUNTER — PATIENT MESSAGE (OUTPATIENT)
Dept: INFECTIOUS DISEASES | Facility: CLINIC | Age: 73
End: 2023-07-07
Payer: MEDICARE

## 2023-07-13 ENCOUNTER — TELEPHONE (OUTPATIENT)
Dept: CARDIOLOGY | Facility: HOSPITAL | Age: 73
End: 2023-07-13
Payer: MEDICARE

## 2023-07-19 ENCOUNTER — TELEPHONE (OUTPATIENT)
Dept: HEMATOLOGY/ONCOLOGY | Facility: CLINIC | Age: 73
End: 2023-07-19
Payer: MEDICARE

## 2023-07-19 DIAGNOSIS — E11.9 TYPE 2 DIABETES MELLITUS WITHOUT COMPLICATION: ICD-10-CM

## 2023-07-19 NOTE — TELEPHONE ENCOUNTER
Nurse spoke with pt in regards to rescheduling her appt due to the provider being out of the office. Nurse rescheduled pts appt. Pt verbalized understanding of changes. Pt also voiced concerns of ear pain, pt stated she would rate the pain at a 2. Pt stated she has allergy and doesn't know if that can cause the problem. Nurse advised that pt that she should come in to see her pcp. Pt declined. Nurse verbalized understanding.

## 2023-08-14 DIAGNOSIS — I10 HYPERTENSION, ESSENTIAL: ICD-10-CM

## 2023-08-14 DIAGNOSIS — E11.65 UNCONTROLLED TYPE 2 DIABETES MELLITUS WITH HYPERGLYCEMIA: ICD-10-CM

## 2023-08-14 DIAGNOSIS — Z79.899 ENCOUNTER FOR LONG-TERM (CURRENT) USE OF MEDICATIONS: ICD-10-CM

## 2023-08-14 DIAGNOSIS — E78.5 HYPERLIPIDEMIA, UNSPECIFIED HYPERLIPIDEMIA TYPE: ICD-10-CM

## 2023-08-14 DIAGNOSIS — E03.9 HYPOTHYROIDISM, UNSPECIFIED TYPE: ICD-10-CM

## 2023-08-14 RX ORDER — ATORVASTATIN CALCIUM 40 MG/1
40 TABLET, FILM COATED ORAL
Qty: 90 TABLET | Refills: 0 | Status: SHIPPED | OUTPATIENT
Start: 2023-08-14 | End: 2024-01-02 | Stop reason: SDUPTHER

## 2023-08-14 RX ORDER — METFORMIN HYDROCHLORIDE 1000 MG/1
TABLET ORAL
Qty: 180 TABLET | Refills: 0 | Status: SHIPPED | OUTPATIENT
Start: 2023-08-14 | End: 2024-01-02 | Stop reason: SDUPTHER

## 2023-08-14 RX ORDER — AMLODIPINE AND BENAZEPRIL HYDROCHLORIDE 10; 20 MG/1; MG/1
1 CAPSULE ORAL
Qty: 90 CAPSULE | Refills: 0 | Status: SHIPPED | OUTPATIENT
Start: 2023-08-14 | End: 2023-10-30 | Stop reason: SDUPTHER

## 2023-08-14 RX ORDER — LEVOTHYROXINE SODIUM 50 UG/1
TABLET ORAL
Qty: 90 TABLET | Refills: 0 | Status: SHIPPED | OUTPATIENT
Start: 2023-08-14 | End: 2024-01-02 | Stop reason: SDUPTHER

## 2023-08-14 NOTE — TELEPHONE ENCOUNTER
Refill Routing Note   Medication(s) are not appropriate for processing by [facility] for the following reason(s):      Required labs outdated  Drug-disease interaction    [facility] action(s):  Defer  Approve Care Due:  Appointment due  Labs due     Medication Therapy Plan: metFORMIN and Chronic hypoxemic respiratory failure    Pharmacist review requested: Yes     Appointments  past 12m or future 3m with PCP    Date Provider   Last Visit   9/12/2022 Jose Steinberg MD   Next Visit   Visit date not found Jose Steinberg MD   ED visits in past 90 days: 0        Note composed:2:26 PM 08/14/2023

## 2023-08-14 NOTE — TELEPHONE ENCOUNTER
Care Due:                  Date            Visit Type   Department     Provider  --------------------------------------------------------------------------------                                EP -                              PRIMARY      [facility]  Last Visit: 09-      CARE ([facility])   MEDICINE       Jose Steinberg  Next Visit: None Scheduled  None         None Found                                                            Last  Test          Frequency    Reason                     Performed    Due Date  --------------------------------------------------------------------------------    Office Visit  12 months..  ANORO,                     09- 09-                             amlodipine-benazepril,                             atorvastatin, metFORMIN..    HBA1C.......  6 months...  metFORMIN................  03- 09-    Lipid Panel.  12 months..  atorvastatin.............  05-   05-    [facility] Embedded Care Due Messages. Reference number: [id number].   8/14/2023 2:05:16 PM CDT

## 2023-08-14 NOTE — TELEPHONE ENCOUNTER
Refill Routing Note   Medication(s) are not appropriate for processing by [facility] for the following reason(s):      Required labs outdated: TSH/lipid panel    [facility] action(s):  Defer  Approve Care Due:  Appointment due 9/7/23  Labs due: A1C due 9/7/23        Pharmacist review requested: Yes   Extended chart review required: Yes     Appointments  past 12m or future 3m with PCP    Date Provider   Last Visit   9/12/2022 Jose Steinberg MD   Next Visit   Visit date not found Jose Steinberg MD   ED visits in past 90 days: 0        Note composed:2:35 PM 08/14/2023

## 2023-08-30 ENCOUNTER — LAB VISIT (OUTPATIENT)
Dept: LAB | Facility: HOSPITAL | Age: 73
End: 2023-08-30
Attending: NURSE PRACTITIONER
Payer: MEDICARE

## 2023-08-30 ENCOUNTER — TELEPHONE (OUTPATIENT)
Dept: HEMATOLOGY/ONCOLOGY | Facility: CLINIC | Age: 73
End: 2023-08-30
Payer: MEDICARE

## 2023-08-30 DIAGNOSIS — D75.1 SECONDARY POLYCYTHEMIA: ICD-10-CM

## 2023-08-30 LAB
BASOPHILS # BLD AUTO: 0.08 K/UL (ref 0–0.2)
BASOPHILS NFR BLD: 0.9 % (ref 0–1.9)
DIFFERENTIAL METHOD: ABNORMAL
EOSINOPHIL # BLD AUTO: 0.1 K/UL (ref 0–0.5)
EOSINOPHIL NFR BLD: 1.4 % (ref 0–8)
ERYTHROCYTE [DISTWIDTH] IN BLOOD BY AUTOMATED COUNT: 13.9 % (ref 11.5–14.5)
HCT VFR BLD AUTO: 52.9 % (ref 37–48.5)
HGB BLD-MCNC: 17.5 G/DL (ref 12–16)
IMM GRANULOCYTES # BLD AUTO: 0.02 K/UL (ref 0–0.04)
IMM GRANULOCYTES NFR BLD AUTO: 0.2 % (ref 0–0.5)
LYMPHOCYTES # BLD AUTO: 2.2 K/UL (ref 1–4.8)
LYMPHOCYTES NFR BLD: 25.2 % (ref 18–48)
MCH RBC QN AUTO: 32.6 PG (ref 27–31)
MCHC RBC AUTO-ENTMCNC: 33.1 G/DL (ref 32–36)
MCV RBC AUTO: 99 FL (ref 82–98)
MONOCYTES # BLD AUTO: 0.8 K/UL (ref 0.3–1)
MONOCYTES NFR BLD: 8.7 % (ref 4–15)
NEUTROPHILS # BLD AUTO: 5.6 K/UL (ref 1.8–7.7)
NEUTROPHILS NFR BLD: 63.8 % (ref 38–73)
NRBC BLD-RTO: 0 /100 WBC
PLATELET # BLD AUTO: 161 K/UL (ref 150–450)
PMV BLD AUTO: 10.9 FL (ref 9.2–12.9)
RBC # BLD AUTO: 5.37 M/UL (ref 4–5.4)
WBC # BLD AUTO: 8.76 K/UL (ref 3.9–12.7)

## 2023-08-30 PROCEDURE — 36415 COLL VENOUS BLD VENIPUNCTURE: CPT | Mod: PO | Performed by: NURSE PRACTITIONER

## 2023-08-30 PROCEDURE — 85025 COMPLETE CBC W/AUTO DIFF WBC: CPT | Mod: PO | Performed by: NURSE PRACTITIONER

## 2023-08-30 NOTE — TELEPHONE ENCOUNTER
----- Message from Esperanza Pedrazakvng sent at 8/30/2023 10:04 AM CDT -----  Contact: pt  Pt is calling in regard to her appt that she missed this morning and would like to see if she can still be seen today.  Please call her back at [phone] thanks/mpd

## 2023-08-30 NOTE — TELEPHONE ENCOUNTER
Nurse spoke with pt in attempt to rescheduling her missed appt. Pt stated she will call back in regards to rescheduling. Nurse verbalized understanding. Call ended well

## 2023-09-13 ENCOUNTER — TELEPHONE (OUTPATIENT)
Dept: FAMILY MEDICINE | Facility: CLINIC | Age: 73
End: 2023-09-13
Payer: MEDICARE

## 2023-09-13 NOTE — TELEPHONE ENCOUNTER
Spoke to pt over the phone, inform her that she is schedule to see isabel 9/14/23 at 9 am . I advised pt to go to the urgent care or ER if symptoms worsen . Pt verbalized understanding

## 2023-09-13 NOTE — TELEPHONE ENCOUNTER
----- Message from Melchor Guillaume sent at 9/13/2023 12:30 PM CDT -----  Regarding: pt call back  Name of Who is Calling:Pt         What is the request in detail: Requesting call back in regards to Pain in legs preferably a blood clot  Please advise           Can the clinic reply by [facility]: no         What Number to Call Back if not [facility]: Telephone Information:  Mobile          [phone]

## 2023-09-18 ENCOUNTER — OFFICE VISIT (OUTPATIENT)
Dept: FAMILY MEDICINE | Facility: CLINIC | Age: 73
End: 2023-09-18
Payer: MEDICARE

## 2023-09-18 VITALS
OXYGEN SATURATION: 88 % | BODY MASS INDEX: 26.55 KG/M2 | SYSTOLIC BLOOD PRESSURE: 117 MMHG | TEMPERATURE: 99 F | DIASTOLIC BLOOD PRESSURE: 70 MMHG | HEART RATE: 91 BPM | WEIGHT: 154.69 LBS

## 2023-09-18 DIAGNOSIS — M79.605 LEFT LEG PAIN: ICD-10-CM

## 2023-09-18 DIAGNOSIS — Z20.818 STREP THROAT EXPOSURE: ICD-10-CM

## 2023-09-18 DIAGNOSIS — H92.09 OTALGIA, UNSPECIFIED LATERALITY: ICD-10-CM

## 2023-09-18 DIAGNOSIS — J02.9 PHARYNGITIS, UNSPECIFIED ETIOLOGY: Primary | ICD-10-CM

## 2023-09-18 PROCEDURE — 1101F PT FALLS ASSESS-DOCD LE1/YR: CPT | Mod: CPTII,S$GLB,, | Performed by: NURSE PRACTITIONER

## 2023-09-18 PROCEDURE — 99214 PR OFFICE/OUTPT VISIT, EST, LEVL IV, 30-39 MIN: ICD-10-PCS | Mod: S$GLB,,, | Performed by: NURSE PRACTITIONER

## 2023-09-18 PROCEDURE — 99999 PR PBB SHADOW E&M-EST. PATIENT-LVL V: ICD-10-PCS | Mod: PBBFAC,,, | Performed by: NURSE PRACTITIONER

## 2023-09-18 PROCEDURE — 1160F RVW MEDS BY RX/DR IN RCRD: CPT | Mod: CPTII,S$GLB,, | Performed by: NURSE PRACTITIONER

## 2023-09-18 PROCEDURE — 3288F FALL RISK ASSESSMENT DOCD: CPT | Mod: CPTII,S$GLB,, | Performed by: NURSE PRACTITIONER

## 2023-09-18 PROCEDURE — 1126F AMNT PAIN NOTED NONE PRSNT: CPT | Mod: CPTII,S$GLB,, | Performed by: NURSE PRACTITIONER

## 2023-09-18 PROCEDURE — 99214 OFFICE O/P EST MOD 30 MIN: CPT | Mod: S$GLB,,, | Performed by: NURSE PRACTITIONER

## 2023-09-18 PROCEDURE — 3044F HG A1C LEVEL LT 7.0%: CPT | Mod: CPTII,S$GLB,, | Performed by: NURSE PRACTITIONER

## 2023-09-18 PROCEDURE — 3074F PR MOST RECENT SYSTOLIC BLOOD PRESSURE < 130 MM HG: ICD-10-PCS | Mod: CPTII,S$GLB,, | Performed by: NURSE PRACTITIONER

## 2023-09-18 PROCEDURE — 3008F BODY MASS INDEX DOCD: CPT | Mod: CPTII,S$GLB,, | Performed by: NURSE PRACTITIONER

## 2023-09-18 PROCEDURE — 3074F SYST BP LT 130 MM HG: CPT | Mod: CPTII,S$GLB,, | Performed by: NURSE PRACTITIONER

## 2023-09-18 PROCEDURE — 99999 PR PBB SHADOW E&M-EST. PATIENT-LVL V: CPT | Mod: PBBFAC,,, | Performed by: NURSE PRACTITIONER

## 2023-09-18 PROCEDURE — 1101F PR PT FALLS ASSESS DOC 0-1 FALLS W/OUT INJ PAST YR: ICD-10-PCS | Mod: CPTII,S$GLB,, | Performed by: NURSE PRACTITIONER

## 2023-09-18 PROCEDURE — 4010F ACE/ARB THERAPY RXD/TAKEN: CPT | Mod: CPTII,S$GLB,, | Performed by: NURSE PRACTITIONER

## 2023-09-18 PROCEDURE — 1160F PR REVIEW ALL MEDS BY PRESCRIBER/CLIN PHARMACIST DOCUMENTED: ICD-10-PCS | Mod: CPTII,S$GLB,, | Performed by: NURSE PRACTITIONER

## 2023-09-18 PROCEDURE — 1159F MED LIST DOCD IN RCRD: CPT | Mod: CPTII,S$GLB,, | Performed by: NURSE PRACTITIONER

## 2023-09-18 PROCEDURE — 1159F PR MEDICATION LIST DOCUMENTED IN MEDICAL RECORD: ICD-10-PCS | Mod: CPTII,S$GLB,, | Performed by: NURSE PRACTITIONER

## 2023-09-18 PROCEDURE — 3044F PR MOST RECENT HEMOGLOBIN A1C LEVEL <7.0%: ICD-10-PCS | Mod: CPTII,S$GLB,, | Performed by: NURSE PRACTITIONER

## 2023-09-18 PROCEDURE — 3078F PR MOST RECENT DIASTOLIC BLOOD PRESSURE < 80 MM HG: ICD-10-PCS | Mod: CPTII,S$GLB,, | Performed by: NURSE PRACTITIONER

## 2023-09-18 PROCEDURE — 4010F PR ACE/ARB THEARPY RXD/TAKEN: ICD-10-PCS | Mod: CPTII,S$GLB,, | Performed by: NURSE PRACTITIONER

## 2023-09-18 PROCEDURE — 3008F PR BODY MASS INDEX (BMI) DOCUMENTED: ICD-10-PCS | Mod: CPTII,S$GLB,, | Performed by: NURSE PRACTITIONER

## 2023-09-18 PROCEDURE — 1126F PR PAIN SEVERITY QUANTIFIED, NO PAIN PRESENT: ICD-10-PCS | Mod: CPTII,S$GLB,, | Performed by: NURSE PRACTITIONER

## 2023-09-18 PROCEDURE — 3288F PR FALLS RISK ASSESSMENT DOCUMENTED: ICD-10-PCS | Mod: CPTII,S$GLB,, | Performed by: NURSE PRACTITIONER

## 2023-09-18 PROCEDURE — 3078F DIAST BP <80 MM HG: CPT | Mod: CPTII,S$GLB,, | Performed by: NURSE PRACTITIONER

## 2023-09-18 RX ORDER — AZITHROMYCIN 250 MG/1
TABLET, FILM COATED ORAL
Qty: 6 TABLET | Refills: 0 | Status: SHIPPED | OUTPATIENT
Start: 2023-09-18 | End: 2023-09-23

## 2023-09-18 NOTE — PATIENT INSTRUCTIONS
Warm salt water gargles PRN  Hydrate well  Rest   Report to ER immediately if symptoms worsen or persist

## 2023-09-18 NOTE — PROGRESS NOTES
"Subjective     Patient ID: Kami Higgins is a 72 y.o. female.    Chief Complaint: Sore Throat and Otalgia    Sore Throat   This is a new problem. The current episode started in the past 7 days. The problem has been unchanged. Neither side of throat is experiencing more pain than the other. There has been no fever. The pain is moderate. Associated symptoms include ear pain. Pertinent negatives include no abdominal pain, congestion, coughing, diarrhea, drooling, ear discharge, headaches, hoarse voice, plugged ear sensation, neck pain, stridor, swollen glands, trouble swallowing or vomiting. She has had exposure to strep. She has had no exposure to mono. She has tried nothing for the symptoms. The treatment provided no relief.   Leg Pain   The incident occurred more than 1 week ago (2 w ago). There was no injury mechanism (Pt denies injury; states, "it felt like it had a clot in it but it feels alright now."). The pain is present in the left leg. The quality of the pain is described as aching. The patient is experiencing no pain (States moderate 2w ago). The pain has been Improving since onset. Pertinent negatives include no inability to bear weight, loss of motion, loss of sensation, muscle weakness, numbness or tingling. She reports no foreign bodies present. Nothing aggravates the symptoms. She has tried nothing for the symptoms. The treatment provided significant relief.     Past Medical History:   Diagnosis Date    AAA (abdominal aortic aneurysm) without rupture     COPD (chronic obstructive pulmonary disease)     Hyperlipidemia     Hypertension     Pulmonary nodule     Thyroid disease      Social History     Socioeconomic History    Marital status:    Tobacco Use    Smoking status: Some Days     Current packs/day: 0.50     Types: Cigarettes    Smokeless tobacco: Never   Substance and Sexual Activity    Alcohol use: Never    Drug use: Never    Sexual activity: Not Currently     Partners: Male     Social " Determinants of Health     Financial Resource Strain: Medium Risk (7/30/2019)    Overall Financial Resource Strain (CARDIA)     Difficulty of Paying Living Expenses: Somewhat hard   Food Insecurity: Food Insecurity Present (7/30/2019)    Hunger Vital Sign     Worried About Running Out of Food in the Last Year: Sometimes true     Ran Out of Food in the Last Year: Sometimes true   Transportation Needs: No Transportation Needs (7/30/2019)    PRAPARE - Transportation     Lack of Transportation (Medical): No     Lack of Transportation (Non-Medical): No   Physical Activity: Insufficiently Active (7/30/2019)    Exercise Vital Sign     Days of Exercise per Week: 3 days     Minutes of Exercise per Session: 40 min   Stress: Stress Concern Present (7/30/2019)    [employer] - Occupational Stress Questionnaire     Feeling of Stress : To some extent   Social Connections: Unknown (7/30/2019)    Social Connection and Isolation Panel [NHANES]     Frequency of Communication with Friends and Family: More than three times a week     Frequency of Social Gatherings with Friends and Family: Three times a week     Attends [employer] Services: Never     Active Member of Clubs or Organizations: No     Attends Club or Organization Meetings: Never     Past Surgical History:   Procedure Laterality Date    ABDOMINAL AORTIC ANEURYSM REPAIR      cardiac catheterization  2017    HYSTERECTOMY      THYROIDECTOMY      TUBAL LIGATION       Review of Systems   Constitutional: Negative.    HENT:  Positive for ear pain. Negative for nasal congestion, drooling, ear discharge, hoarse voice and trouble swallowing.    Eyes: Negative.    Respiratory: Negative.  Negative for cough and stridor.    Cardiovascular: Negative.    Gastrointestinal: Negative.  Negative for abdominal pain, diarrhea and vomiting.   Endocrine: Negative.    Genitourinary: Negative.    Musculoskeletal:  Positive for leg pain. Negative for neck pain.    Integumentary:  Negative.   Allergic/Immunologic: Negative.    Neurological: Negative.  Negative for tingling, numbness and headaches.   Psychiatric/Behavioral: Negative.            Objective     Physical Exam  Vitals and nursing note reviewed.   Constitutional:       Appearance: Normal appearance.   HENT:      Head: Normocephalic.      Right Ear: Hearing, tympanic membrane, ear canal and external ear normal.      Left Ear: Hearing, tympanic membrane, ear canal and external ear normal.      Nose: Nose normal.      Mouth/Throat:      Mouth: Mucous membranes are moist.      Pharynx: Posterior oropharyngeal erythema present.   Eyes:      Conjunctiva/sclera: Conjunctivae normal.      Pupils: Pupils are equal, round, and reactive to light.   Cardiovascular:      Rate and Rhythm: Normal rate and regular rhythm.      Pulses: Normal pulses.      Heart sounds: Normal heart sounds.   Pulmonary:      Effort: Pulmonary effort is normal.      Breath sounds: Normal breath sounds.   Abdominal:      General: Bowel sounds are normal.      Palpations: Abdomen is soft.   Musculoskeletal:         General: Normal range of motion.      Cervical back: Normal range of motion and neck supple.   Skin:     General: Skin is warm and dry.      Capillary Refill: Capillary refill takes 2 to 3 seconds.   Neurological:      Mental Status: She is alert and oriented to person, place, and time.   Psychiatric:         Mood and Affect: Mood normal.         Behavior: Behavior normal.         Thought Content: Thought content normal.         Judgment: Judgment normal.            Assessment and Plan     1. Pharyngitis, unspecified etiology  2. Strep throat exposure  3. Otalgia, unspecified laterality  -     azithromycin (Z-SHANA) 250 MG tablet; Take 2 tablets by mouth on day 1; Take 1 tablet by mouth on days 2-5  Dispense: 6 tablet; Refill: 0  Warm salt water gargles PRN  Hydrate well  Rest     4. Left leg pain  -     US Lower Extremity Veins Left; Future;  Expected date: 09/18/2023    Report to ER immediately if symptoms worsen or persist             No follow-ups on file.

## 2023-09-19 ENCOUNTER — HOSPITAL ENCOUNTER (OUTPATIENT)
Dept: RADIOLOGY | Facility: HOSPITAL | Age: 73
Discharge: HOME | End: 2023-09-19
Attending: NURSE PRACTITIONER
Payer: MEDICARE

## 2023-09-19 DIAGNOSIS — M79.605 LEFT LEG PAIN: ICD-10-CM

## 2023-09-19 PROCEDURE — 93971 US LOWER EXTREMITY VEINS LEFT: ICD-10-PCS | Mod: 26,LT,, | Performed by: RADIOLOGY

## 2023-09-19 PROCEDURE — 93971 EXTREMITY STUDY: CPT | Mod: 26,LT,, | Performed by: RADIOLOGY

## 2023-09-19 PROCEDURE — 93971 EXTREMITY STUDY: CPT | Mod: TC,PO,LT

## 2023-09-21 ENCOUNTER — TELEPHONE (OUTPATIENT)
Dept: FAMILY MEDICINE | Facility: CLINIC | Age: 73
End: 2023-09-21
Payer: MEDICARE

## 2023-09-21 NOTE — TELEPHONE ENCOUNTER
----- Message from Jennifer Zeng sent at 9/21/2023  4:15 PM CDT -----  Name of Who is Calling:Patient           What is the request in detail:Patient returning missed call           Can the clinic reply by [facility]:no           What Number to Call Back if not in [facility]: [phone]

## 2023-09-21 NOTE — TELEPHONE ENCOUNTER
I spoke with the patient about this. Please see patient result note for additional documentation.

## 2023-10-30 DIAGNOSIS — Z79.899 ENCOUNTER FOR LONG-TERM (CURRENT) USE OF MEDICATIONS: ICD-10-CM

## 2023-10-30 DIAGNOSIS — I10 HYPERTENSION, ESSENTIAL: ICD-10-CM

## 2023-10-30 RX ORDER — AMLODIPINE AND BENAZEPRIL HYDROCHLORIDE 10; 20 MG/1; MG/1
1 CAPSULE ORAL DAILY
Qty: 90 CAPSULE | Refills: 3 | Status: SHIPPED | OUTPATIENT
Start: 2023-10-30 | End: 2024-01-02 | Stop reason: SDUPTHER

## 2023-10-30 NOTE — TELEPHONE ENCOUNTER
----- Message from Lindsay Ricketts sent at 10/30/2023  7:37 AM CDT -----  Contact: Kami  Type:  RX Refill Request    Who Called: Kami  Refill or New Rx:refill  RX Name and Strength:amlodipine-benazepril 10-20mg (LOTREL) 10-20 mg per capsule  How is the patient currently taking it? (ex. 1XDay):as prescribed  Is this a 30 day or 90 day RX:90  Preferred Pharmacy with phone number:  [id number] - [place], [place] - [address] AT [facility] [address] [address]  [address] [place] [address]  Phone: [phone] Fax: [phone]  Local or Mail Order:local  Ordering Provider:Dr. Steinberg  Would the patient rather a call back or a response via [facility]? call  Best Call Back Number:[phone]   Additional Information: Patient request prescription refill.  Thank you,  GH

## 2023-10-30 NOTE — TELEPHONE ENCOUNTER
Care Due:                  Date            Visit Type   Department     Provider  --------------------------------------------------------------------------------                                EP -                              PRIMARY      [facility]  Last Visit: 09-      CARE ([facility])   MEDICINE       Jose Steinberg  Next Visit: None Scheduled  None         None Found                                                            Last  Test          Frequency    Reason                     Performed    Due Date  --------------------------------------------------------------------------------    Office Visit  15 months..  ANORO, metFORMIN.........  09- 12-    HBA1C.......  6 months...  metFORMIN................  03- 09-    [employer] Embedded Care Due Messages. Reference number: [id number].   10/30/2023 8:26:08 AM CDT

## 2023-12-20 ENCOUNTER — TELEPHONE (OUTPATIENT)
Dept: FAMILY MEDICINE | Facility: CLINIC | Age: 73
End: 2023-12-20
Payer: MEDICARE

## 2023-12-20 NOTE — TELEPHONE ENCOUNTER
Attempted to contact pt to clarify which medications she needs refilled. Unable to contact: vm full

## 2023-12-20 NOTE — TELEPHONE ENCOUNTER
----- Message from Jimena Ramirez sent at 12/19/2023  2:13 PM CST -----  Contact: patient  [phone]  Patient called requesting Dr. Steinberg send in new orders for all her medication to pharmacy, patient was told she has no refills, please send [id number] - [place], [place] - [address] AT [facility] [address] ([place] [address]), please call patient to discuss

## 2023-12-27 ENCOUNTER — TELEPHONE (OUTPATIENT)
Dept: FAMILY MEDICINE | Facility: CLINIC | Age: 73
End: 2023-12-27
Payer: MEDICARE

## 2023-12-27 NOTE — TELEPHONE ENCOUNTER
----- Message from Galina Broderick sent at 12/27/2023 10:28 AM CST -----  Contact: Vera/ Tj Gamez is calling regards to recent office notes from date of service 9/18/23 fax to [phone] and or call her at [phone] ext [phone].    Thanks  td

## 2024-01-02 ENCOUNTER — TELEPHONE (OUTPATIENT)
Dept: FAMILY MEDICINE | Facility: CLINIC | Age: 74
End: 2024-01-02
Payer: MEDICARE

## 2024-01-02 DIAGNOSIS — J44.9 CHRONIC OBSTRUCTIVE PULMONARY DISEASE, UNSPECIFIED COPD TYPE: ICD-10-CM

## 2024-01-02 DIAGNOSIS — I10 HYPERTENSION, ESSENTIAL: ICD-10-CM

## 2024-01-02 DIAGNOSIS — E78.5 HYPERLIPIDEMIA, UNSPECIFIED HYPERLIPIDEMIA TYPE: ICD-10-CM

## 2024-01-02 DIAGNOSIS — E11.65 UNCONTROLLED TYPE 2 DIABETES MELLITUS WITH HYPERGLYCEMIA: ICD-10-CM

## 2024-01-02 DIAGNOSIS — E03.9 HYPOTHYROIDISM, UNSPECIFIED TYPE: ICD-10-CM

## 2024-01-02 DIAGNOSIS — Z79.899 ENCOUNTER FOR LONG-TERM (CURRENT) USE OF MEDICATIONS: ICD-10-CM

## 2024-01-02 RX ORDER — LEVOTHYROXINE SODIUM 50 UG/1
50 TABLET ORAL EVERY MORNING
Qty: 90 TABLET | Refills: 0 | Status: SHIPPED | OUTPATIENT
Start: 2024-01-02

## 2024-01-02 RX ORDER — ATORVASTATIN CALCIUM 40 MG/1
40 TABLET, FILM COATED ORAL DAILY
Qty: 90 TABLET | Refills: 0 | Status: SHIPPED | OUTPATIENT
Start: 2024-01-02

## 2024-01-02 RX ORDER — UMECLIDINIUM BROMIDE AND VILANTEROL TRIFENATATE 62.5; 25 UG/1; UG/1
POWDER RESPIRATORY (INHALATION)
Qty: 180 EACH | Refills: 0 | Status: SHIPPED | OUTPATIENT
Start: 2024-01-02

## 2024-01-02 RX ORDER — METFORMIN HYDROCHLORIDE 1000 MG/1
1000 TABLET ORAL 2 TIMES DAILY WITH MEALS
Qty: 180 TABLET | Refills: 0 | Status: SHIPPED | OUTPATIENT
Start: 2024-01-02

## 2024-01-02 RX ORDER — AMLODIPINE AND BENAZEPRIL HYDROCHLORIDE 10; 20 MG/1; MG/1
1 CAPSULE ORAL DAILY
Qty: 90 CAPSULE | Refills: 0 | Status: SHIPPED | OUTPATIENT
Start: 2024-01-02

## 2024-01-02 NOTE — TELEPHONE ENCOUNTER
Care Due:                  Date            Visit Type   Department     Provider  --------------------------------------------------------------------------------                                EP -                              PRIMARY      [facility] FAMILY  Last Visit: 09-      CARE ([facility])   MEDICINE       Jose Steinberg  Next Visit: None Scheduled  None         None Found                                                            Last  Test          Frequency    Reason                     Performed    Due Date  --------------------------------------------------------------------------------    Office Visit  15 months..  ANORO, metFORMIN.........  09- 12-    Cr..........  12 months..  metFORMIN................  03- 03-    HBA1C.......  6 months...  metFORMIN................  03- 09-    [facility] Embedded Care Due Messages. Reference number: [id number].   1/02/2024 2:10:25 PM CST

## 2024-01-02 NOTE — TELEPHONE ENCOUNTER
----- Message from Jnue Kwabena sent at 1/2/2024  8:58 AM CST -----  Pt stated she tested positive for covid and is vomiting. She is requesting a call back at [phone] or [phone] Thx.EL

## 2024-01-02 NOTE — TELEPHONE ENCOUNTER
Appointment scheduled for audio visit with Dr Sandoval today, pt aware, advised ER for worsening as she feels she may be getting dehydrated, still urinating well.

## 2024-01-02 NOTE — TELEPHONE ENCOUNTER
----- Message from Jennifer Zuleta LPN sent at 1/2/2024  8:29 AM CST -----  Contact: london  Patient states she is sick with covid for 5 days. Still having cough and congestion. Please advise.   ----- Message -----  From: Phuc Royal  Sent: 1/2/2024   7:37 AM CST  To: Maryan Garcia Staff    Patient is requesting meds, she has been sick with covid for 5 days. She stated that she is not able to keep anything down, cough&congestion and running a fever. Please call her back at [phone].          [id number] - [place], [place] - [address] AT [facility] [address] ([place] [address]  [address] [address]  Phone: [phone] Fax: [phone]      Thanks  DD

## 2024-01-02 NOTE — TELEPHONE ENCOUNTER
----- Message from Liliane Goldsmith sent at 1/2/2024  1:44 PM CST -----  Contact: Pt  Pt is calling rg wanting to know if her medications will be called in /pt was sent to  and can be reached at [phone]//thanks/dbw states she's running out some of medications

## 2024-01-09 ENCOUNTER — TELEPHONE (OUTPATIENT)
Dept: FAMILY MEDICINE | Facility: CLINIC | Age: 74
End: 2024-01-09
Payer: MEDICARE

## 2024-01-09 NOTE — TELEPHONE ENCOUNTER
----- Message from Clarita Dhillon sent at 1/9/2024  4:06 PM CST -----  Contact: london Mcclure is calling regarding having fever and sore throat.  She would like a call back regarding advice on what she should do or if something can be called in for her.  Please give her a call back at [phone].    [id number] - [place], [place] - [address] AT [facility] [address] ([place] [address]  [address] [address]  Phone: [phone] Fax: [phone]

## 2024-01-10 ENCOUNTER — OFFICE VISIT (OUTPATIENT)
Dept: FAMILY MEDICINE | Facility: CLINIC | Age: 74
End: 2024-01-10
Payer: MEDICARE

## 2024-01-10 ENCOUNTER — TELEPHONE (OUTPATIENT)
Dept: FAMILY MEDICINE | Facility: CLINIC | Age: 74
End: 2024-01-10

## 2024-01-10 VITALS
HEIGHT: 64 IN | WEIGHT: 143.38 LBS | OXYGEN SATURATION: 90 % | TEMPERATURE: 99 F | BODY MASS INDEX: 24.48 KG/M2 | HEART RATE: 88 BPM | DIASTOLIC BLOOD PRESSURE: 83 MMHG | RESPIRATION RATE: 18 BRPM | SYSTOLIC BLOOD PRESSURE: 130 MMHG

## 2024-01-10 DIAGNOSIS — H66.001 NON-RECURRENT ACUTE SUPPURATIVE OTITIS MEDIA OF RIGHT EAR WITHOUT SPONTANEOUS RUPTURE OF TYMPANIC MEMBRANE: ICD-10-CM

## 2024-01-10 DIAGNOSIS — R09.82 POST-NASAL DRIP: ICD-10-CM

## 2024-01-10 DIAGNOSIS — J02.9 SORE THROAT: ICD-10-CM

## 2024-01-10 LAB
CTP QC/QA: YES
CTP QC/QA: YES
POC MOLECULAR INFLUENZA A AGN: NEGATIVE
POC MOLECULAR INFLUENZA B AGN: NEGATIVE
S PYO RRNA THROAT QL PROBE: NEGATIVE

## 2024-01-10 PROCEDURE — 99999 PR PBB SHADOW E&M-EST. PATIENT-LVL IV: CPT | Mod: PBBFAC,,, | Performed by: NURSE PRACTITIONER

## 2024-01-10 PROCEDURE — 87880 STREP A ASSAY W/OPTIC: CPT | Mod: QW,S$GLB,, | Performed by: NURSE PRACTITIONER

## 2024-01-10 PROCEDURE — 87502 INFLUENZA DNA AMP PROBE: CPT | Mod: QW,S$GLB,, | Performed by: NURSE PRACTITIONER

## 2024-01-10 PROCEDURE — 99213 OFFICE O/P EST LOW 20 MIN: CPT | Mod: 25,S$GLB,, | Performed by: NURSE PRACTITIONER

## 2024-01-10 RX ORDER — CEFDINIR 300 MG/1
300 CAPSULE ORAL 2 TIMES DAILY
Qty: 20 CAPSULE | Refills: 0 | Status: SHIPPED | OUTPATIENT
Start: 2024-01-10 | End: 2024-01-20

## 2024-01-10 NOTE — TELEPHONE ENCOUNTER
----- Message from Lindsay Ricketts sent at 1/10/2024  2:38 PM CST -----  Contact: Arjun Armenta is calling to speak with a nurse regarding notes. Request to discuss receiving an addendum to chart notes from 9/18/23. Please give Kathi a call back at [phone]; [phone] when possible.  Thank you,  GH

## 2024-01-10 NOTE — PROGRESS NOTES
Subjective:       Patient ID: Kami Higgins is a 73 y.o. female.    Chief Complaint: Sore Throat and Fever    Sore Throat   This is a new problem. The current episode started in the past 7 days. The problem has been unchanged. There has been no fever. The pain is moderate. Associated symptoms include coughing, a hoarse voice and trouble swallowing. Pertinent negatives include no abdominal pain, diarrhea, ear pain, headaches, shortness of breath or vomiting. Associated symptoms comments: Post nasal drip. She has tried acetaminophen for the symptoms. The treatment provided no relief.       Review of Systems   Constitutional:  Negative for fatigue, fever and unexpected weight change.   HENT:  Positive for hoarse voice, postnasal drip, sore throat and trouble swallowing. Negative for ear pain.    Eyes:  Negative for pain and visual disturbance.   Respiratory:  Positive for cough. Negative for shortness of breath.    Cardiovascular:  Negative for chest pain and palpitations.   Gastrointestinal:  Negative for abdominal pain, diarrhea and vomiting.   Musculoskeletal:  Negative for arthralgias and myalgias.   Skin:  Negative for color change and rash.   Neurological:  Negative for dizziness and headaches.   Psychiatric/Behavioral:  Negative for dysphoric mood and sleep disturbance. The patient is not nervous/anxious.        Vitals:    01/10/24 1023   BP: 130/83   Pulse: 88   Resp: 18   Temp: 99 °F (37.2 °C)       Objective:     Current Outpatient Medications   Medication Sig Dispense Refill    albuterol (PROVENTIL/VENTOLIN HFA) 90 mcg/actuation inhaler INHALE ONE OR TWO PUFFS BY MOUTH EVERY FOUR TO SIX HOURS AS NEEDED 54 g 1    albuterol-ipratropium (DUO-NEB) 2.5 mg-0.5 mg/3 mL nebulizer solution Take 3 mLs by nebulization every 4 (four) hours as needed for Wheezing or Shortness of Breath. 1 each 11    amlodipine-benazepril 10-20mg (LOTREL) 10-20 mg per capsule Take 1 capsule by mouth once daily. 90 capsule 0     atorvastatin (LIPITOR) 40 MG tablet Take 1 tablet (40 mg total) by mouth once daily. 90 tablet 0    b complex vitamins capsule Take 1 capsule by mouth once daily.      ketorolac 0.5% (ACULAR) 0.5 % Drop Place into both eyes.      levothyroxine (SYNTHROID) 50 MCG tablet Take 1 tablet (50 mcg total) by mouth every morning. 90 tablet 0    metFORMIN (GLUCOPHAGE) 1000 MG tablet Take 1 tablet (1,000 mg total) by mouth 2 (two) times daily with meals. 180 tablet 0    multivitamin capsule Take 1 capsule by mouth once daily.      ofloxacin (OCUFLOX) 0.3 % ophthalmic solution Apply 5 drops into each ear twice daily for 7 days 1 each 0    ondansetron (ZOFRAN-ODT) 4 MG TbDL Take by mouth.      prednisoLONE acetate (PRED FORTE) 1 % DrpS Place into both eyes.      umeclidinium-vilanteroL (ANORO ELLIPTA) 62.5-25 mcg/actuation DsDv Controller 180 each 0    calcium carbonate (OS-FREDY) 500 mg calcium (1,250 mg) tablet Take 1 tablet (500 mg total) by mouth once daily. 360 tablet 0    calcium-vitamin D3 (OS-FREDY 500 + D3) 500 mg(1,250mg) -200 unit per tablet TAKE ONE TABLET BY MOUTH DAILY  360 tablet 11    cefdinir (OMNICEF) 300 MG capsule Take 1 capsule (300 mg total) by mouth 2 (two) times daily. for 10 days 20 capsule 0    cetirizine (ZYRTEC) 10 MG tablet Take 1 tablet (10 mg total) by mouth once daily. for 10 days 30 tablet 11    omeprazole (PRILOSEC) 20 MG capsule Take 1 capsule (20 mg total) by mouth once daily. 30 capsule 0     No current facility-administered medications for this visit.       Physical Exam  Constitutional:       Appearance: She is well-developed.   HENT:      Head: Normocephalic.      Right Ear: A middle ear effusion is present. Tympanic membrane is injected and erythematous.      Left Ear: Tympanic membrane normal.      Nose: Congestion present.      Mouth/Throat:      Pharynx: Pharyngeal swelling and posterior oropharyngeal erythema present.   Pulmonary:      Effort: Pulmonary effort is normal. No respiratory  distress.   Musculoskeletal:      Cervical back: Normal range of motion.   Neurological:      Mental Status: She is alert and oriented to person, place, and time.   Psychiatric:         Thought Content: Thought content normal.         Judgment: Judgment normal.         Flu is negative  Strep is negative    Assessment:       1. Non-recurrent acute suppurative otitis media of right ear without spontaneous rupture of tympanic membrane    2. Post-nasal drip    3. Sore throat        Plan:   Non-recurrent acute suppurative otitis media of right ear without spontaneous rupture of tympanic membrane    Post-nasal drip    Sore throat  -     POCT Influenza A/B Molecular  -     POCT Rapid Strep A    Other orders  -     cefdinir (OMNICEF) 300 MG capsule; Take 1 capsule (300 mg total) by mouth 2 (two) times daily. for 10 days  Dispense: 20 capsule; Refill: 0        No follow-ups on file.    There are no Patient Instructions on file for this visit.

## 2024-01-12 NOTE — TELEPHONE ENCOUNTER
I have attempted to contact Araseli greene [facility] regarding  this patient by phone with the following results: no answer and voicemail full.

## 2024-01-23 ENCOUNTER — PATIENT OUTREACH (OUTPATIENT)
Dept: ADMINISTRATIVE | Facility: HOSPITAL | Age: 74
End: 2024-01-23
Payer: MEDICARE

## 2024-01-25 DIAGNOSIS — R07.9 CHEST PAIN, UNSPECIFIED TYPE: ICD-10-CM

## 2024-01-25 DIAGNOSIS — Z00.00 ROUTINE ADULT HEALTH MAINTENANCE: ICD-10-CM

## 2024-01-25 DIAGNOSIS — Z76.89 ENCOUNTER TO ESTABLISH CARE WITH NEW DOCTOR: Primary | ICD-10-CM

## 2024-01-25 NOTE — PROGRESS NOTES
Subjective:   Patient ID:  Kami Higgins is a 73 y.o. female who presents for cardiac consult of Chest Pain (Pt stated she had severe chest pains night before. Pt states she still currently has some chest soreness.) and Shortness of Breath      Referral by: No referring provider defined for this encounter.     Reason for consult:       HPI  The patient came in today for cardiac consult of Chest Pain (Pt stated she had severe chest pains night before. Pt states she still currently has some chest soreness.) and Shortness of Breath    1/26/24  Kami Higgins is a 73 y.o. female pt with HTN, HLD, AAA ' 2017, tobacco use, hypothyroidism, Dm2, Vit D, B12 def, COPD on home oxygen - 3.5 L presents for CV eval.     Pt had seen Dr. Cruz in the past, ECHO was ordered, not completed.     CT 9/2022   - Vasculature: Aneurysm of the distal thoracic aorta measuring up to 4.2 cm.  Descending thoracic aorta is ectatic measuring 3.2 cm.  Aortic endograft noted traversing and abdominal aortic aneurysm measuring 5.8 x 6.1 cm.     Pt went to Dr. Taveras before AAA repair.     Pt has been having CP - at night a few nights ago. She also has SOB, on home oxygen at night 3.5 L.     ECG - NSR, PATRIZIA, poor RWP     No cardiac monitor results found for the past 12 months         Past Medical History:   Diagnosis Date    AAA (abdominal aortic aneurysm) without rupture     COPD (chronic obstructive pulmonary disease)     Hyperlipidemia     Hypertension     Pulmonary nodule     Thyroid disease        Past Surgical History:   Procedure Laterality Date    ABDOMINAL AORTIC ANEURYSM REPAIR      cardiac catheterization  2017    HYSTERECTOMY      THYROIDECTOMY      TUBAL LIGATION         Social History     Tobacco Use    Smoking status: Some Days     Current packs/day: 0.50     Types: Cigarettes    Smokeless tobacco: Never   Substance Use Topics    Alcohol use: Never    Drug use: Never       Family History   Problem Relation Age of Onset    Colon  cancer Neg Hx     Stomach cancer Neg Hx     Esophageal cancer Neg Hx        Patient's Medications   New Prescriptions    No medications on file   Previous Medications    ALBUTEROL (PROVENTIL/VENTOLIN HFA) 90 MCG/ACTUATION INHALER    INHALE ONE OR TWO PUFFS BY MOUTH EVERY FOUR TO SIX HOURS AS NEEDED    ALBUTEROL-IPRATROPIUM (DUO-NEB) 2.5 MG-0.5 MG/3 ML NEBULIZER SOLUTION    Take 3 mLs by nebulization every 4 (four) hours as needed for Wheezing or Shortness of Breath.    AMLODIPINE-BENAZEPRIL 10-20MG (LOTREL) 10-20 MG PER CAPSULE    Take 1 capsule by mouth once daily.    ATORVASTATIN (LIPITOR) 40 MG TABLET    Take 1 tablet (40 mg total) by mouth once daily.    B COMPLEX VITAMINS CAPSULE    Take 1 capsule by mouth once daily.    CALCIUM CARBONATE (OS-FREDY) 500 MG CALCIUM (1,250 MG) TABLET    Take 1 tablet (500 mg total) by mouth once daily.    CALCIUM-VITAMIN D3 (OS-FREDY 500 + D3) 500 MG(1,250MG) -200 UNIT PER TABLET    TAKE ONE TABLET BY MOUTH DAILY     CETIRIZINE (ZYRTEC) 10 MG TABLET    Take 1 tablet (10 mg total) by mouth once daily. for 10 days    KETOROLAC 0.5% (ACULAR) 0.5 % DROP    Place into both eyes.    LEVOTHYROXINE (SYNTHROID) 50 MCG TABLET    Take 1 tablet (50 mcg total) by mouth every morning.    METFORMIN (GLUCOPHAGE) 1000 MG TABLET    Take 1 tablet (1,000 mg total) by mouth 2 (two) times daily with meals.    MULTIVITAMIN CAPSULE    Take 1 capsule by mouth once daily.    OFLOXACIN (OCUFLOX) 0.3 % OPHTHALMIC SOLUTION    Apply 5 drops into each ear twice daily for 7 days    OMEPRAZOLE (PRILOSEC) 20 MG CAPSULE    Take 1 capsule (20 mg total) by mouth once daily.    ONDANSETRON (ZOFRAN-ODT) 4 MG TBDL    Take by mouth.    PREDNISOLONE ACETATE (PRED FORTE) 1 % DRPS    Place into both eyes.    UMECLIDINIUM-VILANTEROL (ANORO ELLIPTA) 62.5-25 MCG/ACTUATION DSDV    Controller   Modified Medications    No medications on file   Discontinued Medications    No medications on file       Review of Systems  "  Constitutional:  Positive for malaise/fatigue.   HENT: Negative.     Eyes: Negative.    Respiratory:  Positive for shortness of breath.    Cardiovascular:  Positive for chest pain and palpitations.   Gastrointestinal: Negative.    Genitourinary: Negative.    Musculoskeletal: Negative.    Skin: Negative.    Neurological: Negative.    Endo/Heme/Allergies: Negative.    Psychiatric/Behavioral: Negative.     All 12 systems otherwise negative.      Wt Readings from Last 3 Encounters:   01/26/24 65.9 kg (145 lb 4.5 oz)   01/10/24 65 kg (143 lb 6.4 oz)   09/18/23 70.2 kg (154 lb 11.2 oz)     Temp Readings from Last 3 Encounters:   01/10/24 99 °F (37.2 °C) (Oral)   09/18/23 98.5 °F (36.9 °C)   03/29/23 97.4 °F (36.3 °C)     BP Readings from Last 3 Encounters:   01/26/24 128/78   01/10/24 130/83   09/18/23 117/70     Pulse Readings from Last 3 Encounters:   01/26/24 100   01/10/24 88   09/18/23 91       /78 (BP Location: Right arm, Patient Position: Sitting, BP Method: Medium (Manual))   Pulse 100   Ht 5' 4" (1.626 m)   Wt 65.9 kg (145 lb 4.5 oz)   SpO2 (!) 80%   BMI 24.94 kg/m²     Objective:   Physical Exam  Vitals and nursing note reviewed.   Constitutional:       General: She is not in acute distress.     Appearance: She is well-developed. She is not diaphoretic.   HENT:      Head: Normocephalic and atraumatic.      Nose: Nose normal.   Eyes:      General: No scleral icterus.     Conjunctiva/sclera: Conjunctivae normal.   Neck:      Thyroid: No thyromegaly.      Vascular: No JVD.   Cardiovascular:      Rate and Rhythm: Normal rate and regular rhythm.      Heart sounds: S1 normal and S2 normal. Murmur heard.      No friction rub. No gallop. No S3 or S4 sounds.   Pulmonary:      Effort: Pulmonary effort is normal. No respiratory distress.      Breath sounds: Normal breath sounds. No stridor. No wheezing or rales.   Chest:      Chest wall: No tenderness.   Abdominal:      General: Bowel sounds are normal. There " is no distension.      Palpations: Abdomen is soft. There is no mass.      Tenderness: There is no abdominal tenderness. There is no rebound.   Genitourinary:     Comments: Deferred  Musculoskeletal:         General: No tenderness or deformity. Normal range of motion.      Cervical back: Normal range of motion and neck supple.   Lymphadenopathy:      Cervical: No cervical adenopathy.   Skin:     General: Skin is warm and dry.      Coloration: Skin is not pale.      Findings: No erythema or rash.   Neurological:      Mental Status: She is alert and oriented to person, place, and time.      Motor: No abnormal muscle tone.      Coordination: Coordination normal.   Psychiatric:         Behavior: Behavior normal.         Thought Content: Thought content normal.         Judgment: Judgment normal.         Lab Results   Component Value Date     03/10/2023    K 4.9 03/10/2023     03/10/2023    CO2 25 03/10/2023    CO2 8 06/22/2022    BUN 23 03/10/2023    CREATININE 1.0 03/10/2023    GLU 82 03/10/2023    HGBA1C 6.1 (H) 03/10/2023    AST 15 03/10/2023    ALT 10 03/10/2023    ALBUMIN 4.4 03/10/2023    PROT 7.4 03/10/2023    BILITOT 0.7 03/10/2023    WBC 8.76 08/30/2023    HGB 17.5 (H) 08/30/2023    HCT 52.9 (H) 08/30/2023    MCV 99 (H) 08/30/2023     08/30/2023    TSH 2.611 05/19/2022    CHOL 127 05/19/2022    HDL 41 05/19/2022    LDLCALC 69.4 05/19/2022    TRIG 83 05/19/2022     (H) 05/19/2022         BNP (pg/mL)   Date Value   05/19/2022 142 (H)          Assessment:      1. Nonspecific abnormal electrocardiogram (ECG) (EKG)    2. Infrarenal abdominal aortic aneurysm (AAA) without rupture    3. Chronic obstructive pulmonary disease, unspecified COPD type    4. Pure hypercholesterolemia    5. Hypertension, essential    6. Hypothyroidism, unspecified type    7. Nicotine abuse    8. Type 2 diabetes mellitus with hyperglycemia, without long-term current use of insulin    9. Other chest pain    10. PATEL  (dyspnea on exertion)        Plan:     AAA s/p repair   - cont to monitor  - f/u vasc surg as needed    2. HTN  - titrate meds    3. HLD   -cont statin    4. Hypothyroidism, DM2 A1c 6.1  - cont Synthroid   - cont metformin     5. Tobacco use  - needs cessation     6. COPD  - cont tx     7. CP, SOB  - order pharm nuclear stress test and ECHO  - will discuss further tx  - needs to f/u pulm asap   - order CXR, labs       Thank you for allowing me to participate in this patient's care. Please do not hesitate to contact me with any questions or concerns. Consult note has been forwarded to the referral physician.

## 2024-01-26 ENCOUNTER — OFFICE VISIT (OUTPATIENT)
Dept: CARDIOLOGY | Facility: CLINIC | Age: 74
End: 2024-01-26
Payer: MEDICARE

## 2024-01-26 ENCOUNTER — HOSPITAL ENCOUNTER (OUTPATIENT)
Dept: CARDIOLOGY | Facility: HOSPITAL | Age: 74
Discharge: HOME | End: 2024-01-26
Attending: INTERNAL MEDICINE
Payer: MEDICARE

## 2024-01-26 ENCOUNTER — TELEPHONE (OUTPATIENT)
Dept: FAMILY MEDICINE | Facility: CLINIC | Age: 74
End: 2024-01-26
Payer: MEDICARE

## 2024-01-26 ENCOUNTER — HOSPITAL ENCOUNTER (OUTPATIENT)
Dept: RADIOLOGY | Facility: HOSPITAL | Age: 74
Discharge: HOME | End: 2024-01-26
Attending: INTERNAL MEDICINE
Payer: MEDICARE

## 2024-01-26 VITALS
WEIGHT: 145.31 LBS | SYSTOLIC BLOOD PRESSURE: 128 MMHG | HEART RATE: 100 BPM | DIASTOLIC BLOOD PRESSURE: 78 MMHG | HEIGHT: 64 IN | BODY MASS INDEX: 24.81 KG/M2 | OXYGEN SATURATION: 80 %

## 2024-01-26 DIAGNOSIS — R94.31 NONSPECIFIC ABNORMAL ELECTROCARDIOGRAM (ECG) (EKG): ICD-10-CM

## 2024-01-26 DIAGNOSIS — R07.89 OTHER CHEST PAIN: ICD-10-CM

## 2024-01-26 DIAGNOSIS — E11.65 TYPE 2 DIABETES MELLITUS WITH HYPERGLYCEMIA, WITHOUT LONG-TERM CURRENT USE OF INSULIN: Chronic | ICD-10-CM

## 2024-01-26 DIAGNOSIS — R06.09 DOE (DYSPNEA ON EXERTION): ICD-10-CM

## 2024-01-26 DIAGNOSIS — J44.9 CHRONIC OBSTRUCTIVE PULMONARY DISEASE, UNSPECIFIED COPD TYPE: Chronic | ICD-10-CM

## 2024-01-26 DIAGNOSIS — Z72.0 NICOTINE ABUSE: ICD-10-CM

## 2024-01-26 DIAGNOSIS — Z00.00 ROUTINE ADULT HEALTH MAINTENANCE: ICD-10-CM

## 2024-01-26 DIAGNOSIS — I71.43 INFRARENAL ABDOMINAL AORTIC ANEURYSM (AAA) WITHOUT RUPTURE: ICD-10-CM

## 2024-01-26 DIAGNOSIS — I10 HYPERTENSION, ESSENTIAL: Chronic | ICD-10-CM

## 2024-01-26 DIAGNOSIS — E03.9 HYPOTHYROIDISM, UNSPECIFIED TYPE: ICD-10-CM

## 2024-01-26 DIAGNOSIS — E78.00 PURE HYPERCHOLESTEROLEMIA: ICD-10-CM

## 2024-01-26 DIAGNOSIS — R07.9 CHEST PAIN, UNSPECIFIED TYPE: ICD-10-CM

## 2024-01-26 DIAGNOSIS — Z76.89 ENCOUNTER TO ESTABLISH CARE WITH NEW DOCTOR: ICD-10-CM

## 2024-01-26 DIAGNOSIS — R94.31 NONSPECIFIC ABNORMAL ELECTROCARDIOGRAM (ECG) (EKG): Primary | ICD-10-CM

## 2024-01-26 PROCEDURE — 99215 OFFICE O/P EST HI 40 MIN: CPT | Mod: S$GLB,,, | Performed by: INTERNAL MEDICINE

## 2024-01-26 PROCEDURE — 93005 ELECTROCARDIOGRAM TRACING: CPT

## 2024-01-26 PROCEDURE — 71046 X-RAY EXAM CHEST 2 VIEWS: CPT | Mod: TC

## 2024-01-26 PROCEDURE — 93010 ELECTROCARDIOGRAM REPORT: CPT | Mod: ,,, | Performed by: INTERNAL MEDICINE

## 2024-01-26 PROCEDURE — 99999 PR PBB SHADOW E&M-EST. PATIENT-LVL V: CPT | Mod: PBBFAC,,, | Performed by: INTERNAL MEDICINE

## 2024-01-26 PROCEDURE — 71046 X-RAY EXAM CHEST 2 VIEWS: CPT | Mod: 26,,, | Performed by: STUDENT IN AN ORGANIZED HEALTH CARE EDUCATION/TRAINING PROGRAM

## 2024-01-26 NOTE — TELEPHONE ENCOUNTER
----- Message from Amy Marquis sent at 1/26/2024  2:21 PM CST -----  Contact: [phone]  .Patient is calling to speak with the nurse regarding appt  . Reports calling bck  . Please give patient a call back at [phone] .

## 2024-01-26 NOTE — TELEPHONE ENCOUNTER
----- Message from Minnie Vickers sent at 1/26/2024  1:38 PM CST -----  Contact: Kami Heaton is calling in regards to after taking all the antibiotic her throat is still not clear.please call back at .[phone]  or [phone]      .    [id number] - [place], [place] - [address] AT [facility] OF [address] [address]  [address] [address]  Phone: [phone] Fax: [phone]            Thanks  DARIUSZ

## 2024-01-29 ENCOUNTER — TELEPHONE (OUTPATIENT)
Dept: CARDIOLOGY | Facility: CLINIC | Age: 74
End: 2024-01-29
Payer: MEDICARE

## 2024-01-29 NOTE — TELEPHONE ENCOUNTER
The patient has been notified of the results            Please contact the patient and let them know that their results of labs are overall stable, kidney may be borderline dry so increase fluid/water intake. The fluid level -BNP is low which is good so do not need any fluid pills now. Monitor BP and weight at home and continue low salt diet.

## 2024-01-31 DIAGNOSIS — Z78.0 MENOPAUSE: ICD-10-CM

## 2024-02-07 ENCOUNTER — TELEPHONE (OUTPATIENT)
Dept: PULMONOLOGY | Facility: CLINIC | Age: 74
End: 2024-02-07
Payer: MEDICARE

## 2024-02-07 ENCOUNTER — TELEPHONE (OUTPATIENT)
Dept: FAMILY MEDICINE | Facility: CLINIC | Age: 74
End: 2024-02-07
Payer: MEDICARE

## 2024-02-07 NOTE — TELEPHONE ENCOUNTER
Returned call back to Jeanna. Stated she needed a note stating the patient is using and benefiting from NIV therapy. Staff informed provider of this----- Message from Kaya Gardner sent at 2/7/2024  8:46 AM CST -----  Contact: Jeanna/ Kathi Meeks is calling to speak to the nurse regarding the patient visit on 04/14/2023, she have some questions that she need clarification on please give her a call at  ext [phone]     Thanks  LJ

## 2024-02-07 NOTE — TELEPHONE ENCOUNTER
----- Message from Socorro Jerome NP sent at 2/7/2024  2:05 PM CST -----  Contact: Jeanna/ aKthi  I only saw her for sore throat. She will need to f/u with Maryan    ----- Message -----  From: Jennifer Zuleta LPN  Sent: 2/7/2024   1:56 PM CST  To: Socorro Jerome NP    You seen this patient and they are asking about a letter stating she uses her vent at home. Would that be something you could handle or does she need to come see Dr. Steinberg for that?   ----- Message -----  From: Kaya Gardner  Sent: 2/7/2024   8:48 AM CST  To: [facility] [place] [facility] Staff    Jeanna is calling to speak to the nurse regarding the patient visit on 09/18/2023, she have some questions that she need clarification on please give her a call at  ext [phone]     Thanks  LJ

## 2024-02-09 ENCOUNTER — HOSPITAL ENCOUNTER (OUTPATIENT)
Dept: RADIOLOGY | Facility: HOSPITAL | Age: 74
Discharge: HOME | End: 2024-02-09
Attending: INTERNAL MEDICINE
Payer: MEDICARE

## 2024-02-09 ENCOUNTER — HOSPITAL ENCOUNTER (OUTPATIENT)
Dept: CARDIOLOGY | Facility: HOSPITAL | Age: 74
Discharge: HOME | End: 2024-02-09
Attending: INTERNAL MEDICINE
Payer: MEDICARE

## 2024-02-09 VITALS
SYSTOLIC BLOOD PRESSURE: 128 MMHG | DIASTOLIC BLOOD PRESSURE: 78 MMHG | HEIGHT: 64 IN | BODY MASS INDEX: 24.75 KG/M2 | WEIGHT: 145 LBS

## 2024-02-09 DIAGNOSIS — J44.9 CHRONIC OBSTRUCTIVE PULMONARY DISEASE, UNSPECIFIED COPD TYPE: Chronic | ICD-10-CM

## 2024-02-09 DIAGNOSIS — I10 HYPERTENSION, ESSENTIAL: Chronic | ICD-10-CM

## 2024-02-09 DIAGNOSIS — E78.00 PURE HYPERCHOLESTEROLEMIA: ICD-10-CM

## 2024-02-09 DIAGNOSIS — E03.9 HYPOTHYROIDISM, UNSPECIFIED TYPE: ICD-10-CM

## 2024-02-09 DIAGNOSIS — Z72.0 NICOTINE ABUSE: ICD-10-CM

## 2024-02-09 DIAGNOSIS — E11.65 TYPE 2 DIABETES MELLITUS WITH HYPERGLYCEMIA, WITHOUT LONG-TERM CURRENT USE OF INSULIN: Chronic | ICD-10-CM

## 2024-02-09 DIAGNOSIS — I71.43 INFRARENAL ABDOMINAL AORTIC ANEURYSM (AAA) WITHOUT RUPTURE: ICD-10-CM

## 2024-02-09 DIAGNOSIS — R94.31 NONSPECIFIC ABNORMAL ELECTROCARDIOGRAM (ECG) (EKG): ICD-10-CM

## 2024-02-09 LAB
AORTIC ROOT ANNULUS: 2.57 CM
ASCENDING AORTA: 2.6 CM
AV INDEX (PROSTH): 0.52
AV MEAN GRADIENT: 11 MMHG
AV PEAK GRADIENT: 21 MMHG
AV VALVE AREA BY VELOCITY RATIO: 1.47 CM²
AV VALVE AREA: 1.58 CM²
AV VELOCITY RATIO: 0.48
BSA FOR ECHO PROCEDURE: 1.72 M2
CV ECHO LV RWT: 0.51 CM
DOP CALC AO PEAK VEL: 2.28 M/S
DOP CALC AO VTI: 52.5 CM
DOP CALC LVOT AREA: 3 CM2
DOP CALC LVOT DIAMETER: 1.97 CM
DOP CALC LVOT PEAK VEL: 1.1 M/S
DOP CALC LVOT STROKE VOLUME: 82.86 CM3
DOP CALC RVOT PEAK VEL: 0.91 M/S
DOP CALC RVOT VTI: 10.6 CM
DOP CALCLVOT PEAK VEL VTI: 27.2 CM
E WAVE DECELERATION TIME: 299.86 MSEC
E/A RATIO: 0.77
E/E' RATIO: 13.23 M/S
ECHO LV POSTERIOR WALL: 1.01 CM (ref 0.6–1.1)
FRACTIONAL SHORTENING: 37 % (ref 28–44)
INTERVENTRICULAR SEPTUM: 1.04 CM (ref 0.6–1.1)
IVC DIAMETER: 1.71 CM
IVRT: 88.49 MSEC
LA MAJOR: 4.71 CM
LA MINOR: 4.62 CM
LA WIDTH: 3.1 CM
LEFT ATRIUM SIZE: 2.59 CM
LEFT ATRIUM VOLUME INDEX MOD: 23.3 ML/M2
LEFT ATRIUM VOLUME INDEX: 18.6 ML/M2
LEFT ATRIUM VOLUME MOD: 39.92 CM3
LEFT ATRIUM VOLUME: 31.83 CM3
LEFT INTERNAL DIMENSION IN SYSTOLE: 2.49 CM (ref 2.1–4)
LEFT VENTRICLE DIASTOLIC VOLUME INDEX: 40.45 ML/M2
LEFT VENTRICLE DIASTOLIC VOLUME: 69.17 ML
LEFT VENTRICLE MASS INDEX: 76 G/M2
LEFT VENTRICLE SYSTOLIC VOLUME INDEX: 13 ML/M2
LEFT VENTRICLE SYSTOLIC VOLUME: 22.15 ML
LEFT VENTRICULAR INTERNAL DIMENSION IN DIASTOLE: 3.98 CM (ref 3.5–6)
LEFT VENTRICULAR MASS: 130.57 G
LV LATERAL E/E' RATIO: 12.29 M/S
LV SEPTAL E/E' RATIO: 14.33 M/S
LVOT MG: 2.85 MMHG
LVOT MV: 0.81 CM/S
MV PEAK A VEL: 1.11 M/S
MV PEAK E VEL: 0.86 M/S
MV STENOSIS PRESSURE HALF TIME: 86.96 MS
MV VALVE AREA P 1/2 METHOD: 2.53 CM2
PISA TR MAX VEL: 3.55 M/S
PV MEAN GRADIENT: 2 MMHG
PV PEAK GRADIENT: 5 MMHG
PV PEAK VELOCITY: 1.07 M/S
RA MAJOR: 3.74 CM
RA PRESSURE ESTIMATED: 3 MMHG
RA WIDTH: 3.05 CM
RIGHT VENTRICULAR END-DIASTOLIC DIMENSION: 2.89 CM
RV TB RVSP: 7 MMHG
SINUS: 2.71 CM
STJ: 2.32 CM
TDI LATERAL: 0.07 M/S
TDI SEPTAL: 0.06 M/S
TDI: 0.07 M/S
TR MAX PG: 50 MMHG
TRICUSPID ANNULAR PLANE SYSTOLIC EXCURSION: 2.39 CM
TV REST PULMONARY ARTERY PRESSURE: 53 MMHG
Z-SCORE OF LEFT VENTRICULAR DIMENSION IN END DIASTOLE: -1.77
Z-SCORE OF LEFT VENTRICULAR DIMENSION IN END SYSTOLE: -1.31

## 2024-02-09 PROCEDURE — 93017 CV STRESS TEST TRACING ONLY: CPT

## 2024-02-09 PROCEDURE — 93306 TTE W/DOPPLER COMPLETE: CPT | Mod: 26,,, | Performed by: INTERNAL MEDICINE

## 2024-02-09 PROCEDURE — A9502 TC99M TETROFOSMIN: HCPCS

## 2024-02-09 PROCEDURE — 93018 CV STRESS TEST I&R ONLY: CPT | Mod: ,,, | Performed by: INTERNAL MEDICINE

## 2024-02-09 PROCEDURE — 63600175 PHARM REV CODE 636 W HCPCS: Performed by: INTERNAL MEDICINE

## 2024-02-09 PROCEDURE — 93306 TTE W/DOPPLER COMPLETE: CPT

## 2024-02-09 PROCEDURE — 93016 CV STRESS TEST SUPVJ ONLY: CPT | Mod: ,,, | Performed by: INTERNAL MEDICINE

## 2024-02-09 PROCEDURE — 78452 HT MUSCLE IMAGE SPECT MULT: CPT | Mod: 26,,, | Performed by: INTERNAL MEDICINE

## 2024-02-09 RX ORDER — REGADENOSON 0.08 MG/ML
0.4 INJECTION, SOLUTION INTRAVENOUS ONCE
Status: COMPLETED | OUTPATIENT
Start: 2024-02-09 | End: 2024-02-09

## 2024-02-09 RX ADMIN — REGADENOSON 0.4 MG: 0.08 INJECTION, SOLUTION INTRAVENOUS at 11:02

## 2024-02-10 LAB
CV STRESS BASE HR: 80 BPM
DIASTOLIC BLOOD PRESSURE: 92 MMHG
NUC REST EJECTION FRACTION: 76
NUC STRESS EJECTION FRACTION: 69 %
OHS CV CPX 85 PERCENT MAX PREDICTED HEART RATE MALE: 127
OHS CV CPX MAX PREDICTED HEART RATE: 149
OHS CV CPX PATIENT IS FEMALE: 1
OHS CV CPX PATIENT IS MALE: 0
OHS CV CPX PEAK DIASTOLIC BLOOD PRESSURE: 82 MMHG
OHS CV CPX PEAK HEAR RATE: 107 BPM
OHS CV CPX PEAK RATE PRESSURE PRODUCT: NORMAL
OHS CV CPX PEAK SYSTOLIC BLOOD PRESSURE: 145 MMHG
OHS CV CPX PERCENT MAX PREDICTED HEART RATE ACHIEVED: 75
OHS CV CPX RATE PRESSURE PRODUCT PRESENTING: NORMAL
SYSTOLIC BLOOD PRESSURE: 143 MMHG

## 2024-02-19 DIAGNOSIS — J44.9 CHRONIC OBSTRUCTIVE PULMONARY DISEASE, UNSPECIFIED COPD TYPE: ICD-10-CM

## 2024-02-19 DIAGNOSIS — Z79.899 ENCOUNTER FOR LONG-TERM (CURRENT) USE OF MEDICATIONS: ICD-10-CM

## 2024-02-19 NOTE — TELEPHONE ENCOUNTER
----- Message from Sierra Balbuena sent at 2/19/2024  3:28 PM CST -----  Type:  RX Refill Request    Who Called: pt  Refill or New Rx:refill  RX Name and Strength:anora inhaler  How is the patient currently taking it? (ex. 1XDay):  Is this a 30 day or 90 day RX:90  Preferred Pharmacy with phone number: [id number] - [place], [place] - [address] AT [facility] OF [address] [place] ([place] [address])  Local or Mail Order:local  Ordering Provider:  Would the patient rather a call back or a response via [facility]? call  Best Call Back Number:[phone]  Additional Information:

## 2024-02-19 NOTE — TELEPHONE ENCOUNTER
Care Due:                  Date            Visit Type   Department     Provider  --------------------------------------------------------------------------------                                EP -                              PRIMARY      [facility]  Last Visit: 09-      CARE ([facility])   MEDICINE       Jose Steinberg  Next Visit: None Scheduled  None         None Found                                                            Last  Test          Frequency    Reason                     Performed    Due Date  --------------------------------------------------------------------------------    CMP.........  12 months..  atorvastatin.............  03-   03-    Lipid Panel.  12 months..  atorvastatin.............  05-   05-    TSH.........  12 months..  levothyroxine............  05-   05-    [employer] Embedded Care Due Messages. Reference number: [id number].   2/19/2024 4:17:36 PM CST

## 2024-02-20 RX ORDER — ALBUTEROL SULFATE 90 UG/1
AEROSOL, METERED RESPIRATORY (INHALATION)
Qty: 54 G | Refills: 1 | Status: SHIPPED | OUTPATIENT
Start: 2024-02-20 | End: 2024-05-01 | Stop reason: SDUPTHER

## 2024-02-20 NOTE — TELEPHONE ENCOUNTER
Refill Routing Note   Medication(s) are not appropriate for processing by [facility] for the following reason(s):        Patient not seen by provider within 15 months    [facility] action(s):  Defer     Requires labs : Yes             Appointments  past 12m or future 3m with PCP    Date Provider   Last Visit   9/12/2022 Jose Steinberg MD   Next Visit   Visit date not found Jose Steinberg MD   ED visits in past 90 days: 0        Note composed:8:01 PM 02/19/2024

## 2024-03-01 DIAGNOSIS — J44.9 CHRONIC OBSTRUCTIVE PULMONARY DISEASE, UNSPECIFIED COPD TYPE: ICD-10-CM

## 2024-03-01 DIAGNOSIS — Z79.899 ENCOUNTER FOR LONG-TERM (CURRENT) USE OF MEDICATIONS: ICD-10-CM

## 2024-03-01 RX ORDER — UMECLIDINIUM BROMIDE AND VILANTEROL TRIFENATATE 62.5; 25 UG/1; UG/1
POWDER RESPIRATORY (INHALATION)
Qty: 180 EACH | Refills: 0 | Status: SHIPPED | OUTPATIENT
Start: 2024-03-01 | End: 2024-05-01 | Stop reason: SDUPTHER

## 2024-03-01 NOTE — TELEPHONE ENCOUNTER
No care due was identified.  [facility] Embedded Care Due Messages. Reference number: [id number].   3/01/2024 1:19:58 PM CST

## 2024-03-01 NOTE — TELEPHONE ENCOUNTER
Refill Routing Note   Medication(s) are not appropriate for processing by [facility] for the following reason(s):        Patient not seen by provider within 15 months    [facility] action(s):  Defer               Appointments  past 12m or future 3m with PCP    Date Provider   Last Visit   9/12/2022 Jose Steinberg MD   Next Visit   Visit date not found Jose Steinberg MD   ED visits in past 90 days: 0        Note composed:3:28 PM 03/01/2024

## 2024-03-01 NOTE — TELEPHONE ENCOUNTER
----- Message from Mei Vickers sent at 3/1/2024  1:03 PM CST -----  .Type:  RX Refill Request    Who Called:  pt   Refill or New Rx: refill   RX Name and Strength: inhaler   How is the patient currently taking it? (ex. 1XDay):  Is this a 30 day or 90 day RX:30  Preferred Pharmacy with phone number:.      [id number] - [place], [place] - [address] AT [facility] [address] ([place] [address]  [address] [address]  Phone: [phone] Fax: [phone]     Local or Mail Order:local  Ordering Provider:  Would the patient rather a call back or a response via [facility]?  Call back   Best Call Back Number [phone]  Additional Information:  refill

## 2024-03-20 ENCOUNTER — PATIENT OUTREACH (OUTPATIENT)
Dept: ADMINISTRATIVE | Facility: OTHER | Age: 74
End: 2024-03-20
Payer: MEDICARE

## 2024-03-20 ENCOUNTER — PATIENT OUTREACH (OUTPATIENT)
Dept: ADMINISTRATIVE | Facility: HOSPITAL | Age: 74
End: 2024-03-20
Payer: MEDICARE

## 2024-03-20 DIAGNOSIS — E11.65 TYPE 2 DIABETES MELLITUS WITH HYPERGLYCEMIA, WITHOUT LONG-TERM CURRENT USE OF INSULIN: Primary | Chronic | ICD-10-CM

## 2024-03-20 NOTE — ADDENDUM NOTE
Addended by: SULEIMAN TOLBERT on: 3/20/2024 02:57 PM     Modules accepted: Orders     Spontaneous, unlabored and symmetrical

## 2024-03-20 NOTE — Clinical Note
Pt has transportation issues. She would like to keep upcoming appointments but says she is not guaranteed to get a ride.

## 2024-03-20 NOTE — PROGRESS NOTES
VBHM Score: 8     Colon Cancer Screening  Osteoporosis Screening  Urine Screening  Eye Exam  Hemoglobin A1c  Lipid Panel  Mammogram  Foot Exam    Influenza Vaccine  RSV Vaccine                  Health Maintenance Topic(s) Outreach Outcomes & Actions Taken:    Breast Cancer Screening - Outreach Outcomes & Actions Taken  : Pt Declined Scheduling Mammogram    Colorectal Cancer Screening - Outreach Outcomes & Actions Taken  : Pt Declined Completing Colorectal Cancer Screening    Lab(s) - Outreach Outcomes & Actions Taken  : Primary Care Follow Up Visit Scheduled     Osteoporosis Screening - Outreach Outcomes & Actions Taken  : Patient Declined Scheduling Dexa or Will Call Back to Schedule    Diabetic Foot Exam - Outreach Outcomes & Actions Taken  : Scheduled for PCP Visit    Eye Exam - Outreach Outcomes & Actions Taken  : Pt Declined Scheduling Eye Exam                       Additional Notes:  Pt declined scheduling all screenings but says she will come in to see PCP if she can get a ride.              Care Management, Digital Medicine, and/or Education Referrals    OPCM Risk Score: 71.3         Next Steps - Referral Actions: Referral placed for OPCM and Referral place for OPCM to CHW for SDOH Food Insecurity & Transportation Needs if applicable         Additional Notes:  Patient is apprehensive about doing screenings, she declines Mammogram & informs she is afraid to do Colonoscopy and would like to know the process & machine used to do Dexa. Pt agrees to see PCP to discuss further but says she will need transportation.

## 2024-03-29 DIAGNOSIS — E03.9 HYPOTHYROIDISM, UNSPECIFIED TYPE: ICD-10-CM

## 2024-03-29 DIAGNOSIS — E78.5 HYPERLIPIDEMIA, UNSPECIFIED HYPERLIPIDEMIA TYPE: ICD-10-CM

## 2024-03-29 DIAGNOSIS — Z79.899 ENCOUNTER FOR LONG-TERM (CURRENT) USE OF MEDICATIONS: ICD-10-CM

## 2024-03-29 NOTE — TELEPHONE ENCOUNTER
Care Due:                  Date            Visit Type   Department     Provider  --------------------------------------------------------------------------------                                EP -                              PRIMARY      [facility]  Last Visit: 09-      CARE ([facility])   MEDICINE       Jose Steinberg                              [facility]  Next Visit: 06-      CARE ([facility])   [facility]       Jose Steinberg                                                            Last  Test          Frequency    Reason                     Performed    Due Date  --------------------------------------------------------------------------------    Office Visit  15 months..  amlodipine-benazepril,     09- 12-                             atorvastatin,                             levothyroxine, metFORMIN,                             umeclidinium-vilanteroL..    HBA1C.......  6 months...  metFORMIN................  03- 09-    [facility] Embedded Care Due Messages. Reference number: [id number].   3/29/2024 3:48:06 AM CDANGIE

## 2024-04-01 NOTE — TELEPHONE ENCOUNTER
Refill Routing Note   Medication(s) are not appropriate for processing by [facility] for the following reason(s):        Required labs outdated  Patient not seen by provider within 15 months    [facility] action(s):  Defer     Requires labs : Yes      Medication Therapy Plan: FOV      Appointments  past 12m or future 3m with PCP    Date Provider   Last Visit   9/12/2022 Jose Steinberg MD   Next Visit   6/28/2024 Jose Steinberg MD   ED visits in past 90 days: 0        Note composed:8:08 AM 04/01/2024

## 2024-04-02 RX ORDER — ATORVASTATIN CALCIUM 40 MG/1
40 TABLET, FILM COATED ORAL
Qty: 90 TABLET | Refills: 0 | Status: SHIPPED | OUTPATIENT
Start: 2024-04-02

## 2024-04-02 RX ORDER — LEVOTHYROXINE SODIUM 50 UG/1
TABLET ORAL
Qty: 90 TABLET | Refills: 0 | Status: SHIPPED | OUTPATIENT
Start: 2024-04-02

## 2024-04-03 ENCOUNTER — PATIENT MESSAGE (OUTPATIENT)
Dept: PULMONOLOGY | Facility: CLINIC | Age: 74
End: 2024-04-03
Payer: MEDICARE

## 2024-04-04 ENCOUNTER — TELEPHONE (OUTPATIENT)
Dept: FAMILY MEDICINE | Facility: CLINIC | Age: 74
End: 2024-04-04
Payer: MEDICARE

## 2024-04-04 NOTE — TELEPHONE ENCOUNTER
----- Message from Arnulfo Fleming MA sent at 4/3/2024  4:58 PM CDT -----  Contact: Kami    ----- Message -----  From: Janice Mahajan  Sent: 4/3/2024   3:21 PM CDT  To: Maryan Garcia Staff    Pt requests a call back from the nurse regarding her prescriptions. Please call her back at [phone].    Thanks  TS

## 2024-04-06 DIAGNOSIS — Z79.899 ENCOUNTER FOR LONG-TERM (CURRENT) USE OF MEDICATIONS: ICD-10-CM

## 2024-04-06 DIAGNOSIS — E11.65 UNCONTROLLED TYPE 2 DIABETES MELLITUS WITH HYPERGLYCEMIA: ICD-10-CM

## 2024-04-06 NOTE — TELEPHONE ENCOUNTER
No care due was identified.  Health [facility] Embedded Care Due Messages. Reference number: [id number].   4/06/2024 3:49:09 AM CDT

## 2024-04-08 RX ORDER — METFORMIN HYDROCHLORIDE 1000 MG/1
1000 TABLET ORAL 2 TIMES DAILY WITH MEALS
Qty: 180 TABLET | Refills: 0 | Status: SHIPPED | OUTPATIENT
Start: 2024-04-08

## 2024-04-08 NOTE — PROGRESS NOTES
CHW - Initial Contact    This Community Health Worker updated the Social Determinant of Health questionnaire with patient via telephone today.    Pt was referred by [facility] to OPCM for transportation needs.   Pt stated vehicle is now fixed and needs no resources. [facility] [place] [facility] just in case.   We agreed to a follow up in about a month.    Follow-up Outreach - Due: 5/6/2024

## 2024-04-08 NOTE — TELEPHONE ENCOUNTER
Refill Routing Note   Medication(s) are not appropriate for processing by [facility] for the following reason(s):        Patient not seen by provider within 15 months  Required labs outdated    [facility] action(s):  Defer               Appointments  past 12m or future 3m with PCP    Date Provider   Last Visit   9/12/2022 Jose Steinberg MD   Next Visit   6/28/2024 Jose Steinberg MD   ED visits in past 90 days: 0        Note composed:7:55 AM 04/08/2024

## 2024-04-18 ENCOUNTER — OFFICE VISIT (OUTPATIENT)
Dept: CARDIOLOGY | Facility: CLINIC | Age: 74
End: 2024-04-18
Payer: MEDICARE

## 2024-04-18 ENCOUNTER — PATIENT MESSAGE (OUTPATIENT)
Dept: PULMONOLOGY | Facility: CLINIC | Age: 74
End: 2024-04-18
Payer: MEDICARE

## 2024-04-18 VITALS
SYSTOLIC BLOOD PRESSURE: 130 MMHG | WEIGHT: 143.06 LBS | DIASTOLIC BLOOD PRESSURE: 84 MMHG | BODY MASS INDEX: 24.42 KG/M2 | OXYGEN SATURATION: 71 % | HEIGHT: 64 IN | HEART RATE: 81 BPM

## 2024-04-18 DIAGNOSIS — E78.00 PURE HYPERCHOLESTEROLEMIA: ICD-10-CM

## 2024-04-18 DIAGNOSIS — I71.43 INFRARENAL ABDOMINAL AORTIC ANEURYSM (AAA) WITHOUT RUPTURE: ICD-10-CM

## 2024-04-18 DIAGNOSIS — R07.9 CHEST PAIN, UNSPECIFIED TYPE: ICD-10-CM

## 2024-04-18 DIAGNOSIS — J44.9 CHRONIC OBSTRUCTIVE PULMONARY DISEASE, UNSPECIFIED COPD TYPE: ICD-10-CM

## 2024-04-18 DIAGNOSIS — R94.31 NONSPECIFIC ABNORMAL ELECTROCARDIOGRAM (ECG) (EKG): ICD-10-CM

## 2024-04-18 DIAGNOSIS — I10 HYPERTENSION, ESSENTIAL: ICD-10-CM

## 2024-04-18 DIAGNOSIS — E11.65 TYPE 2 DIABETES MELLITUS WITH HYPERGLYCEMIA, WITHOUT LONG-TERM CURRENT USE OF INSULIN: ICD-10-CM

## 2024-04-18 DIAGNOSIS — I27.20 PULMONARY HYPERTENSION: ICD-10-CM

## 2024-04-18 DIAGNOSIS — R06.09 DOE (DYSPNEA ON EXERTION): Primary | ICD-10-CM

## 2024-04-18 DIAGNOSIS — E03.9 HYPOTHYROIDISM, UNSPECIFIED TYPE: ICD-10-CM

## 2024-04-18 PROCEDURE — 3008F BODY MASS INDEX DOCD: CPT | Mod: CPTII,S$GLB,, | Performed by: INTERNAL MEDICINE

## 2024-04-18 PROCEDURE — 3075F SYST BP GE 130 - 139MM HG: CPT | Mod: CPTII,S$GLB,, | Performed by: INTERNAL MEDICINE

## 2024-04-18 PROCEDURE — 1160F RVW MEDS BY RX/DR IN RCRD: CPT | Mod: CPTII,S$GLB,, | Performed by: INTERNAL MEDICINE

## 2024-04-18 PROCEDURE — 1101F PT FALLS ASSESS-DOCD LE1/YR: CPT | Mod: CPTII,S$GLB,, | Performed by: INTERNAL MEDICINE

## 2024-04-18 PROCEDURE — 99214 OFFICE O/P EST MOD 30 MIN: CPT | Mod: S$GLB,,, | Performed by: INTERNAL MEDICINE

## 2024-04-18 PROCEDURE — 3288F FALL RISK ASSESSMENT DOCD: CPT | Mod: CPTII,S$GLB,, | Performed by: INTERNAL MEDICINE

## 2024-04-18 PROCEDURE — G2211 COMPLEX E/M VISIT ADD ON: HCPCS | Mod: S$GLB,,, | Performed by: INTERNAL MEDICINE

## 2024-04-18 PROCEDURE — 1159F MED LIST DOCD IN RCRD: CPT | Mod: CPTII,S$GLB,, | Performed by: INTERNAL MEDICINE

## 2024-04-18 PROCEDURE — 1126F AMNT PAIN NOTED NONE PRSNT: CPT | Mod: CPTII,S$GLB,, | Performed by: INTERNAL MEDICINE

## 2024-04-18 PROCEDURE — 3079F DIAST BP 80-89 MM HG: CPT | Mod: CPTII,S$GLB,, | Performed by: INTERNAL MEDICINE

## 2024-04-18 PROCEDURE — 99999 PR PBB SHADOW E&M-EST. PATIENT-LVL IV: CPT | Mod: PBBFAC,,, | Performed by: INTERNAL MEDICINE

## 2024-04-18 PROCEDURE — 4010F ACE/ARB THERAPY RXD/TAKEN: CPT | Mod: CPTII,S$GLB,, | Performed by: INTERNAL MEDICINE

## 2024-04-18 NOTE — PROGRESS NOTES
Subjective:   Patient ID:  Kami Higgins is a 73 y.o. female who presents for cardiac consult of No chief complaint on file.      Referral by: No referring provider defined for this encounter.     Reason for consult:       HPI  The patient came in today for cardiac consult of No chief complaint on file.      Kami Higgins is a 73 y.o. female pt with HTN, HLD, AAA ' 2017, tobacco use, pulm HTN, hypothyroidism, Dm2, Vit D, B12 def, COPD on home oxygen - 3.5 L presents for follow up CV eval.       24  Pt had seen Dr. Cruz in the past, ECHO was ordered, not completed.     CT 2022   - Vasculature: Aneurysm of the distal thoracic aorta measuring up to 4.2 cm.  Descending thoracic aorta is ectatic measuring 3.2 cm.  Aortic endograft noted traversing and abdominal aortic aneurysm measuring 5.8 x 6.1 cm.   Pt went to Dr. Taveras before AAA repair.   Pt has been having CP - at night a few nights ago. She also has SOB, on home oxygen at night 3.5 L.   ECG - NSR, PATRIZIA, poor RWP     24  Nuclear stress neg 2024. ECHO 2024 with normal bi V function, mild AS, pulm HTN  - PASP 53 mmHg.   BNP was neg.     BP and HR stable. BMI 24  Her sister , had liver cirrhosis was 63 years old.       No cardiac monitor results found for the past 12 months     Results for orders placed during the hospital encounter of 24    Echo    Interpretation Summary    Left Ventricle: The left ventricle is normal in size. Normal wall thickness. There is concentric remodeling. Normal wall motion. There is normal systolic function with a visually estimated ejection fraction of 60 - 65%. There is normal diastolic function.    Right Ventricle: Normal right ventricular cavity size. Wall thickness is normal. Right ventricle wall motion  is normal. Systolic function is normal.    Aortic Valve: There is mild aortic valve sclerosis. There is mild stenosis. Aortic valve area by VTI is 1.58 cm². Aortic valve peak velocity is 2.28 m/s. Mean  gradient is 11 mmHg. The dimensionless index is 0.52.    Mitral Valve: There is moderate mitral annular calcification present.    Pulmonary Artery: The estimated pulmonary artery systolic pressure is 53 mmHg.    IVC/SVC: Normal venous pressure at 3 mmHg.      Results for orders placed during the hospital encounter of 02/09/24    Nuclear Stress - Cardiology Interpreted    Interpretation Summary    Normal myocardial perfusion scan. There is no evidence of myocardial ischemia or infarction.    The gated perfusion images showed an ejection fraction of 76% at rest. The gated perfusion images showed an ejection fraction of 69% post stress.    There is normal wall motion at rest and post stress.    LV cavity size is normal at rest and normal at stress.    The ECG portion of the study is negative for ischemia.    The patient reported no chest pain during the stress test.      Past Medical History:   Diagnosis Date    AAA (abdominal aortic aneurysm) without rupture     COPD (chronic obstructive pulmonary disease)     Hyperlipidemia     Hypertension     Pulmonary nodule     Thyroid disease        Past Surgical History:   Procedure Laterality Date    ABDOMINAL AORTIC ANEURYSM REPAIR      cardiac catheterization  2017    HYSTERECTOMY      THYROIDECTOMY      TUBAL LIGATION         Social History     Tobacco Use    Smoking status: Some Days     Current packs/day: 0.50     Types: Cigarettes    Smokeless tobacco: Never   Substance Use Topics    Alcohol use: Never    Drug use: Never       Family History   Problem Relation Name Age of Onset    Colon cancer Neg Hx      Stomach cancer Neg Hx      Esophageal cancer Neg Hx         Patient's Medications   New Prescriptions    No medications on file   Previous Medications    ALBUTEROL (PROVENTIL/VENTOLIN HFA) 90 MCG/ACTUATION INHALER    INHALE ONE OR TWO PUFFS BY MOUTH EVERY FOUR TO SIX HOURS AS NEEDED    ALBUTEROL-IPRATROPIUM (DUO-NEB) 2.5 MG-0.5 MG/3 ML NEBULIZER SOLUTION    Take 3 mLs  by nebulization every 4 (four) hours as needed for Wheezing or Shortness of Breath.    AMLODIPINE-BENAZEPRIL 10-20MG (LOTREL) 10-20 MG PER CAPSULE    Take 1 capsule by mouth once daily.    ATORVASTATIN (LIPITOR) 40 MG TABLET    TAKE 1 TABLET(40 MG) BY MOUTH EVERY DAY    B COMPLEX VITAMINS CAPSULE    Take 1 capsule by mouth once daily.    CALCIUM CARBONATE (OS-FREDY) 500 MG CALCIUM (1,250 MG) TABLET    Take 1 tablet (500 mg total) by mouth once daily.    CALCIUM-VITAMIN D3 (OS-FREDY 500 + D3) 500 MG(1,250MG) -200 UNIT PER TABLET    TAKE ONE TABLET BY MOUTH DAILY     CETIRIZINE (ZYRTEC) 10 MG TABLET    Take 1 tablet (10 mg total) by mouth once daily. for 10 days    KETOROLAC 0.5% (ACULAR) 0.5 % DROP    Place into both eyes.    LEVOTHYROXINE (SYNTHROID) 50 MCG TABLET    TAKE 1 TABLET(50 MCG) BY MOUTH EVERY MORNING    METFORMIN (GLUCOPHAGE) 1000 MG TABLET    TAKE 1 TABLET(1000 MG) BY MOUTH TWICE DAILY WITH MEALS    MULTIVITAMIN CAPSULE    Take 1 capsule by mouth once daily.    OFLOXACIN (OCUFLOX) 0.3 % OPHTHALMIC SOLUTION    Apply 5 drops into each ear twice daily for 7 days    OMEPRAZOLE (PRILOSEC) 20 MG CAPSULE    Take 1 capsule (20 mg total) by mouth once daily.    ONDANSETRON (ZOFRAN-ODT) 4 MG TBDL    Take by mouth.    PREDNISOLONE ACETATE (PRED FORTE) 1 % DRPS    Place into both eyes.    UMECLIDINIUM-VILANTEROL (ANORO ELLIPTA) 62.5-25 MCG/ACTUATION DSDV    Controller   Modified Medications    No medications on file   Discontinued Medications    No medications on file       Review of Systems   Constitutional:  Positive for malaise/fatigue.   HENT: Negative.     Eyes: Negative.    Respiratory:  Positive for shortness of breath.    Cardiovascular:  Positive for chest pain and palpitations.   Gastrointestinal: Negative.    Genitourinary: Negative.    Musculoskeletal: Negative.    Skin: Negative.    Neurological: Negative.    Endo/Heme/Allergies: Negative.    Psychiatric/Behavioral: Negative.     All 12 systems otherwise  "negative.      Wt Readings from Last 3 Encounters:   04/18/24 64.9 kg (143 lb 1.3 oz)   02/09/24 65.8 kg (145 lb)   01/26/24 65.9 kg (145 lb 4.5 oz)     Temp Readings from Last 3 Encounters:   01/10/24 99 °F (37.2 °C) (Oral)   09/18/23 98.5 °F (36.9 °C)   03/29/23 97.4 °F (36.3 °C)     BP Readings from Last 3 Encounters:   04/18/24 130/84   02/09/24 128/78   01/26/24 128/78     Pulse Readings from Last 3 Encounters:   04/18/24 81   01/26/24 100   01/10/24 88       /84 (BP Location: Left arm, Patient Position: Sitting, BP Method: Medium (Manual))   Pulse 81   Ht 5' 4" (1.626 m)   Wt 64.9 kg (143 lb 1.3 oz)   SpO2 (!) 71%   BMI 24.56 kg/m²     Objective:   Physical Exam  Vitals and nursing note reviewed.   Constitutional:       General: She is not in acute distress.     Appearance: She is well-developed. She is not diaphoretic.   HENT:      Head: Normocephalic and atraumatic.      Nose: Nose normal.   Eyes:      General: No scleral icterus.     Conjunctiva/sclera: Conjunctivae normal.   Neck:      Thyroid: No thyromegaly.      Vascular: No JVD.   Cardiovascular:      Rate and Rhythm: Normal rate and regular rhythm.      Heart sounds: S1 normal and S2 normal. Murmur heard.      No friction rub. No gallop. No S3 or S4 sounds.   Pulmonary:      Effort: Pulmonary effort is normal. No respiratory distress.      Breath sounds: Normal breath sounds. No stridor. No wheezing or rales.   Chest:      Chest wall: No tenderness.   Abdominal:      General: Bowel sounds are normal. There is no distension.      Palpations: Abdomen is soft. There is no mass.      Tenderness: There is no abdominal tenderness. There is no rebound.   Genitourinary:     Comments: Deferred  Musculoskeletal:         General: No tenderness or deformity. Normal range of motion.      Cervical back: Normal range of motion and neck supple.   Lymphadenopathy:      Cervical: No cervical adenopathy.   Skin:     General: Skin is warm and dry.      " Coloration: Skin is not pale.      Findings: No erythema or rash.   Neurological:      Mental Status: She is alert and oriented to person, place, and time.      Motor: No abnormal muscle tone.      Coordination: Coordination normal.   Psychiatric:         Behavior: Behavior normal.         Thought Content: Thought content normal.         Judgment: Judgment normal.         Lab Results   Component Value Date     01/26/2024    K 4.6 01/26/2024     01/26/2024    CO2 28 01/26/2024    CO2 8 06/22/2022    BUN 26 (H) 01/26/2024    CREATININE 0.8 01/26/2024    GLU 86 01/26/2024    HGBA1C 6.1 (H) 03/10/2023    MG 1.8 01/26/2024    AST 15 03/10/2023    ALT 10 03/10/2023    ALBUMIN 4.4 03/10/2023    PROT 7.4 03/10/2023    BILITOT 0.7 03/10/2023    WBC 8.76 08/30/2023    HGB 17.5 (H) 08/30/2023    HCT 52.9 (H) 08/30/2023    MCV 99 (H) 08/30/2023     08/30/2023    TSH 2.611 05/19/2022    CHOL 127 05/19/2022    HDL 41 05/19/2022    LDLCALC 69.4 05/19/2022    TRIG 83 05/19/2022    BNP 43 01/26/2024         BNP (pg/mL)   Date Value   01/26/2024 43   05/19/2022 142 (H)          Assessment:      1. PATEL (dyspnea on exertion)    2. Infrarenal abdominal aortic aneurysm (AAA) without rupture    3. Chronic obstructive pulmonary disease, unspecified COPD type    4. Pure hypercholesterolemia    5. Hypothyroidism, unspecified type    6. Hypertension, essential    7. Type 2 diabetes mellitus with hyperglycemia, without long-term current use of insulin    8. Nonspecific abnormal electrocardiogram (ECG) (EKG)    9. Chest pain, unspecified type    10. Pulmonary hypertension          Plan:     AAA s/p repair   - cont to monitor  - f/u vasc surg as needed    2. HTN  - titrate meds    3. HLD   -cont statin    4. Hypothyroidism, DM2 A1c 6.1  - cont Synthroid   - cont metformin     5. Tobacco use  - needs cessation     6. COPD, pulm HTN  - cont tx   - f/u pulm    7. CP, SOB  - Nuclear stress neg 2/2024.   -ECHO 2/2024 with normal bi  V function, mild AS, pulm HTN  - PASP 53 mmHg.   - will discuss further tx  - needs to f/u pulm asap   - BNP neg    Visit today included increased complexity associated with the care of the episodic problem dyspnea addressed and managing the longitudinal care of the patient due to the serious and/or complex managed problem(s) .      Thank you for allowing me to participate in this patient's care. Please do not hesitate to contact me with any questions or concerns. Consult note has been forwarded to the referral physician.

## 2024-04-29 DIAGNOSIS — J44.9 CHRONIC OBSTRUCTIVE PULMONARY DISEASE, UNSPECIFIED COPD TYPE: ICD-10-CM

## 2024-04-29 DIAGNOSIS — Z79.899 ENCOUNTER FOR LONG-TERM (CURRENT) USE OF MEDICATIONS: ICD-10-CM

## 2024-04-30 NOTE — TELEPHONE ENCOUNTER
Refill Routing Note   Medication(s) are not appropriate for processing by [facility] for the following reason(s):        Patient not seen by provider within 15 months: future office visit 6/24/24     [facility] action(s):  Defer               Appointments  past 12m or future 3m with PCP    Date Provider   Last Visit   9/12/2022 Jose Steinberg MD   Next Visit   6/24/2024 Jose Steinberg MD   ED visits in past 90 days: 0        Note composed:5:50 PM 04/30/2024

## 2024-04-30 NOTE — TELEPHONE ENCOUNTER
No care due was identified.  Health [facility] Embedded Care Due Messages. Reference number: [id number].   4/29/2024 8:49:47 PM CDT

## 2024-05-01 ENCOUNTER — HOSPITAL ENCOUNTER (OUTPATIENT)
Dept: RADIOLOGY | Facility: HOSPITAL | Age: 74
Discharge: HOME | End: 2024-05-01
Attending: NURSE PRACTITIONER
Payer: MEDICARE

## 2024-05-01 ENCOUNTER — OFFICE VISIT (OUTPATIENT)
Dept: FAMILY MEDICINE | Facility: CLINIC | Age: 74
End: 2024-05-01
Payer: MEDICARE

## 2024-05-01 VITALS
HEART RATE: 76 BPM | SYSTOLIC BLOOD PRESSURE: 139 MMHG | HEIGHT: 64 IN | WEIGHT: 142.63 LBS | DIASTOLIC BLOOD PRESSURE: 76 MMHG | OXYGEN SATURATION: 92 % | BODY MASS INDEX: 24.35 KG/M2

## 2024-05-01 DIAGNOSIS — Z20.818 EXPOSURE TO STREP THROAT: ICD-10-CM

## 2024-05-01 DIAGNOSIS — B96.89 BACTERIAL LOWER RESPIRATORY INFECTION: ICD-10-CM

## 2024-05-01 DIAGNOSIS — T36.95XA ANTIBIOTIC-INDUCED YEAST INFECTION: ICD-10-CM

## 2024-05-01 DIAGNOSIS — J22 BACTERIAL LOWER RESPIRATORY INFECTION: ICD-10-CM

## 2024-05-01 DIAGNOSIS — J44.0 CHRONIC OBSTRUCTIVE PULMONARY DISEASE WITH ACUTE LOWER RESPIRATORY INFECTION: Primary | ICD-10-CM

## 2024-05-01 DIAGNOSIS — Z79.899 ENCOUNTER FOR LONG-TERM (CURRENT) USE OF MEDICATIONS: ICD-10-CM

## 2024-05-01 DIAGNOSIS — R05.1 ACUTE COUGH: ICD-10-CM

## 2024-05-01 DIAGNOSIS — B37.9 ANTIBIOTIC-INDUCED YEAST INFECTION: ICD-10-CM

## 2024-05-01 PROCEDURE — 3075F SYST BP GE 130 - 139MM HG: CPT | Mod: CPTII,S$GLB,, | Performed by: NURSE PRACTITIONER

## 2024-05-01 PROCEDURE — 4010F ACE/ARB THERAPY RXD/TAKEN: CPT | Mod: CPTII,S$GLB,, | Performed by: NURSE PRACTITIONER

## 2024-05-01 PROCEDURE — 1101F PT FALLS ASSESS-DOCD LE1/YR: CPT | Mod: CPTII,S$GLB,, | Performed by: NURSE PRACTITIONER

## 2024-05-01 PROCEDURE — 71046 X-RAY EXAM CHEST 2 VIEWS: CPT | Mod: 26,,, | Performed by: RADIOLOGY

## 2024-05-01 PROCEDURE — 3078F DIAST BP <80 MM HG: CPT | Mod: CPTII,S$GLB,, | Performed by: NURSE PRACTITIONER

## 2024-05-01 PROCEDURE — 99214 OFFICE O/P EST MOD 30 MIN: CPT | Mod: S$GLB,,, | Performed by: NURSE PRACTITIONER

## 2024-05-01 PROCEDURE — 71046 X-RAY EXAM CHEST 2 VIEWS: CPT | Mod: TC,PO

## 2024-05-01 PROCEDURE — 1159F MED LIST DOCD IN RCRD: CPT | Mod: CPTII,S$GLB,, | Performed by: NURSE PRACTITIONER

## 2024-05-01 PROCEDURE — 1125F AMNT PAIN NOTED PAIN PRSNT: CPT | Mod: CPTII,S$GLB,, | Performed by: NURSE PRACTITIONER

## 2024-05-01 PROCEDURE — 99999 PR PBB SHADOW E&M-EST. PATIENT-LVL V: CPT | Mod: PBBFAC,,, | Performed by: NURSE PRACTITIONER

## 2024-05-01 PROCEDURE — 3288F FALL RISK ASSESSMENT DOCD: CPT | Mod: CPTII,S$GLB,, | Performed by: NURSE PRACTITIONER

## 2024-05-01 PROCEDURE — 3008F BODY MASS INDEX DOCD: CPT | Mod: CPTII,S$GLB,, | Performed by: NURSE PRACTITIONER

## 2024-05-01 RX ORDER — PROMETHAZINE HYDROCHLORIDE AND DEXTROMETHORPHAN HYDROBROMIDE 6.25; 15 MG/5ML; MG/5ML
5 SYRUP ORAL EVERY 6 HOURS PRN
Qty: 118 ML | Refills: 0 | Status: SHIPPED | OUTPATIENT
Start: 2024-05-01 | End: 2024-05-11

## 2024-05-01 RX ORDER — ALBUTEROL SULFATE 90 UG/1
AEROSOL, METERED RESPIRATORY (INHALATION)
Qty: 54 G | Refills: 0 | Status: SHIPPED | OUTPATIENT
Start: 2024-05-01 | End: 2024-05-01 | Stop reason: SDUPTHER

## 2024-05-01 RX ORDER — DOXYCYCLINE 100 MG/1
100 CAPSULE ORAL EVERY 12 HOURS
Qty: 20 CAPSULE | Refills: 0 | Status: SHIPPED | OUTPATIENT
Start: 2024-05-01

## 2024-05-01 RX ORDER — UMECLIDINIUM BROMIDE AND VILANTEROL TRIFENATATE 62.5; 25 UG/1; UG/1
POWDER RESPIRATORY (INHALATION)
Qty: 180 EACH | Refills: 0 | Status: SHIPPED | OUTPATIENT
Start: 2024-05-01 | End: 2024-05-30

## 2024-05-01 RX ORDER — FLUCONAZOLE 150 MG/1
150 TABLET ORAL
Qty: 3 TABLET | Refills: 0 | Status: SHIPPED | OUTPATIENT
Start: 2024-05-01 | End: 2024-05-08

## 2024-05-01 RX ORDER — ALBUTEROL SULFATE 90 UG/1
AEROSOL, METERED RESPIRATORY (INHALATION)
Qty: 54 G | Refills: 0 | Status: SHIPPED | OUTPATIENT
Start: 2024-05-01

## 2024-05-01 NOTE — PATIENT INSTRUCTIONS
Chandu Mcclure,     If you are due for any health screening(s) below please notify me so we can arrange them to be ordered and scheduled. Most healthy patients at your age complete them, but you are free to accept or refuse.     If you can't do it, I'll definitely understand. If you can, I'd certainly appreciate it!    Tests to Keep You Healthy    Mammogram: ORDERED BUT NOT SCHEDULED  Eye Exam: DUE  Colon Cancer Screening: DUE  Last Blood Pressure <= 139/89 (5/1/2024): Yes  Last HbA1c < 8 (03/10/2023): NO  Tobacco Cessation: NO      Schedule your breast cancer screening today     Breast cancer is the second most common cancer in women,  and the second leading cause of death from cancer. Mammograms can detect breast cancer early, which significantly increases the chances of curing the cancer.       Our records indicate that you may be overdue for breast cancer screening. Cancer screenings save lives, so schedule yours today to stay healthy.     If you recently had a mammogram performed outside of [facility], please let your Health care team know so that they can update your health record.        Its time for your colon cancer screening     Colorectal cancer is one of the leading causes of cancer death for men and women but it doesnt have to be. Screenings can prevent colorectal cancer or find it early enough to treat and cure the disease.     Our records indicate that you may be overdue for colon cancer screening. A colonoscopy or stool screening test can help identify patients at risk for developing colon cancer. Cancer screenings save lives, so schedule yours today to stay healthy.     A colonoscopy is the preferred test for detecting colon cancer. It is needed only once every 10 years if results are negative. While you are sedated, a flexible, lighted tube with a tiny camera is inserted into the rectum and advanced through the colon to look for cancers.     An alternative screening test that is used at  home and returned to the lab may also be used. It detects hidden blood in bowel movements which could indicate cancer in the colon. If results are positive, you will need a colonoscopy to determine if the blood is a sign of cancer. This type of follow up (diagnostic) colonoscopy usually requires additional copays as required by your insurance provider.     If you recently had your colon cancer screening performed outside of [facility], please let your Health care team know so that they can update your health record. Please contact your PCP if you have any questions.    Your diabetic retinal eye exam is due     Diabetes is the #1 cause of blindness in the [place] - early detection before signs or symptoms develop can prevent debilitating blindness.     Our records indicate that you may be overdue for your annual diabetic eye exam. Eye screening can help identify patients at risk for developing vision loss which is common in diabetes. This simple screening is an important step to keeping you healthy and preventing complications from diabetes.     This recommended diabetic eye exam should take place once per year and can prevent and treat diabetes complications in the eye before developing symptoms. This can be done with a special camera is used to take photographs of the back of your eye without having to dilate them, or you can see an eye doctor for a full dilated exam.     If you recently had your yearly diabetic eye exam performed outside of [facility], please let your Health care team know so that they can update your health record.        Were here to help you quit smoking     Our records indicated that you are still smoking. One of the best things you can do for your health is to stop smoking and we are here to help.     Talk with your provider about our Smoking Cessation Program and how we can support you on your journey.

## 2024-05-01 NOTE — ASSESSMENT & PLAN NOTE
Anoro daily. Albuterol prn. Inhalers refilled. She defers steroids today because they make her jittery. Start Doxycycline. Promethazine DM PRN for cough. Oxygen was 81% on RA, 2L NC applied and oxygen up to 92%. She has home portable oxygen tank with her.  CXR today. Stressed smoking cessation. UTD on pneumonia vaccine. Follow up with pulmonology. ER precautions.

## 2024-05-01 NOTE — PROGRESS NOTES
Assessment/Plan:  Problem List Items Addressed This Visit          Pulmonary    Chronic obstructive pulmonary disease - Primary (Chronic)    Overview     Chronic.  Intermittent control. Following with pulmonology. Using albuterol inhaler PRN and Anoro daily. On supplemental oxygen. Requesting refill on inhalers. Here w/ cough, SOB, wheezing. Denies fever.     Previous HPI:  Control uncertain.  Patient takes albuterol inhaler and also has a nebulizer at home.  Patient sees pulmonology.  Patient is supposed to be on oxygen.    May 2022:  Patient reports to me that she ran out of her oxygen.  She had a change in insurance and the previous company will not continue to provide her oxygen.  She needs a new order.  Her pulmonologist also retired so she needs a referral to Pulmonary.  She continues to use inhaler sparingly.  She continues to smoke.  She has not had the COVID vaccine.  She is due for pneumonia vaccine.         Current Assessment & Plan     Anoro daily. Albuterol prn. Inhalers refilled. She defers steroids today because they make her jittery. Start Doxycycline. Promethazine DM PRN for cough. Oxygen was 81% on RA, 2L NC applied and oxygen up to 92%. She has home portable oxygen tank with her.  CXR today. Stressed smoking cessation. UTD on pneumonia vaccine. Follow up with pulmonology. ER precautions.          Relevant Medications    doxycycline (VIBRAMYCIN) 100 MG Cap    albuterol (PROVENTIL/VENTOLIN HFA) 90 mcg/actuation inhaler    umeclidinium-vilanteroL (ANORO ELLIPTA) 62.5-25 mcg/actuation DsDv    Other Relevant Orders    X-Ray Chest PA And Lateral (Completed)       Other    Encounter for long-term (current) use of medications (Chronic)    Overview     May 2024: reviewed labs.  September 2022: Reviewed labs.  07/30/2019 \CHRONIC long-term drug therapy for managed conditions. See medication list. Reports compliance.  No side effects reported.  Routine lab work is being monitored.  Patient does  need refills  today.   Updating labs today.  Requesting records from [facility].  CHRONIC. Stable. Compliant with medications for managed conditions. See medication list. No SE reported.   Routine lab analysis is being monitored. Refills were addressed.  Lab Results   Component Value Date    WBC 8.76 08/30/2023    HGB 17.5 (H) 08/30/2023    HCT 52.9 (H) 08/30/2023    MCV 99 (H) 08/30/2023     08/30/2023         Chemistry        Component Value Date/Time     01/26/2024 0912    K 4.6 01/26/2024 0912     01/26/2024 0912    CO2 28 01/26/2024 0912    CO2 8 06/22/2022 1205    BUN 26 (H) 01/26/2024 0912    CREATININE 0.8 01/26/2024 0912    GLU 86 01/26/2024 0912        Component Value Date/Time    CALCIUM 9.4 01/26/2024 0912    ALKPHOS 90 03/10/2023 1316    AST 15 03/10/2023 1316    ALT 10 03/10/2023 1316    BILITOT 0.7 03/10/2023 1316    ESTGFRAFRICA >60.0 06/22/2022 1205    EGFRNONAA >60.0 06/22/2022 1205          Lab Results   Component Value Date    TSH 2.611 05/19/2022    B6WHQHE 6.4 07/30/2019    FREET4 0.75 05/19/2022    T3FREE 2.5 05/19/2022              Current Assessment & Plan     Complete history and physical was completed today.  Complete and thorough medication reconciliation was performed.  Discussed risks and benefits of medications.  Advised patient on orders and health maintenance.  We discussed old records and old labs if available.  Will request any records not available through epic.  Continue current medications listed on your summary sheet.         Relevant Medications    albuterol (PROVENTIL/VENTOLIN HFA) 90 mcg/actuation inhaler    umeclidinium-vilanteroL (ANORO ELLIPTA) 62.5-25 mcg/actuation DsDv     Other Visit Diagnoses       Acute cough        Relevant Medications    promethazine-dextromethorphan (PROMETHAZINE-DM) 6.25-15 mg/5 mL Syrp    Exposure to strep throat        Relevant Orders    POCT Rapid Strep A    Antibiotic-induced yeast infection        Relevant Medications    fluconazole  (DIFLUCAN) 150 MG Tab          Follow up if symptoms worsen or fail to improve.  ER precautions for severe or worsening symptoms.     Keiko Broderick, NP  _____________________________________________________________________________________________________________________________________________________    CC: sore throat; cough     HPI: Patient is a 73-year-old female who presents in clinic today as an established patient here for cough. This is a new problem. The current episode started a couple days ago. The problem is gradually worsening. There has been no fever. She is experiencing no pain. Associated symptoms include congestion, postnasal drip, sore throat, wheezing, shortness of breath. Chronic cigarette smoker w/ history of COPD. She is using anoro inhaler daily and albuterol inhaler PRN. Requesting refills. She is on supplemental oxygen. Cough is mucopurulent. Pertinent negatives include no chills, diaphoresis, ear pain, headaches, hoarse voice, neck pain, or swollen glands. She reports that her grand kids have strep throat and she was recently exposed. Denies recent steroids of ABX. She defers steroids, states they make her jittery. States she needs diflucan w/ antibiotics due to vaginal yeast infections.     Past Medical History:  Past Medical History:   Diagnosis Date    AAA (abdominal aortic aneurysm) without rupture     COPD (chronic obstructive pulmonary disease)     Hyperlipidemia     Hypertension     Pulmonary nodule     Thyroid disease      Past Surgical History:   Procedure Laterality Date    ABDOMINAL AORTIC ANEURYSM REPAIR      cardiac catheterization  2017    HYSTERECTOMY      THYROIDECTOMY      TUBAL LIGATION       Review of patient's allergies indicates:   Allergen Reactions    Aspirin, buffered     Codeine     Penicillins Hives    Singulair [montelukast]      Social History     Tobacco Use    Smoking status: Some Days     Current packs/day: 0.50     Types: Cigarettes    Smokeless tobacco:  Never   Substance Use Topics    Alcohol use: Never    Drug use: Never     Family History   Problem Relation Name Age of Onset    Colon cancer Neg Hx      Stomach cancer Neg Hx      Esophageal cancer Neg Hx       Current Outpatient Medications on File Prior to Visit   Medication Sig Dispense Refill    albuterol-ipratropium (DUO-NEB) 2.5 mg-0.5 mg/3 mL nebulizer solution Take 3 mLs by nebulization every 4 (four) hours as needed for Wheezing or Shortness of Breath. 1 each 11    amlodipine-benazepril 10-20mg (LOTREL) 10-20 mg per capsule Take 1 capsule by mouth once daily. 90 capsule 0    atorvastatin (LIPITOR) 40 MG tablet TAKE 1 TABLET(40 MG) BY MOUTH EVERY DAY 90 tablet 0    b complex vitamins capsule Take 1 capsule by mouth once daily.      ketorolac 0.5% (ACULAR) 0.5 % Drop Place into both eyes.      levothyroxine (SYNTHROID) 50 MCG tablet TAKE 1 TABLET(50 MCG) BY MOUTH EVERY MORNING 90 tablet 0    metFORMIN (GLUCOPHAGE) 1000 MG tablet TAKE 1 TABLET(1000 MG) BY MOUTH TWICE DAILY WITH MEALS (Patient taking differently: Take 1,000 mg by mouth once.) 180 tablet 0    multivitamin capsule Take 1 capsule by mouth once daily.      ofloxacin (OCUFLOX) 0.3 % ophthalmic solution Apply 5 drops into each ear twice daily for 7 days 1 each 0    ondansetron (ZOFRAN-ODT) 4 MG TbDL Take by mouth.      prednisoLONE acetate (PRED FORTE) 1 % DrpS Place into both eyes.      [DISCONTINUED] albuterol (PROVENTIL/VENTOLIN HFA) 90 mcg/actuation inhaler INHALE 1 OR 2 PUFFS BY MOUTH EVERY 4 TO 6 HOURS AS NEEDED 54 g 0    [DISCONTINUED] umeclidinium-vilanteroL (ANORO ELLIPTA) 62.5-25 mcg/actuation DsDv Controller 180 each 0    calcium carbonate (OS-FREDY) 500 mg calcium (1,250 mg) tablet Take 1 tablet (500 mg total) by mouth once daily. 360 tablet 0    calcium-vitamin D3 (OS-FREDY 500 + D3) 500 mg(1,250mg) -200 unit per tablet TAKE ONE TABLET BY MOUTH DAILY  360 tablet 11    cetirizine (ZYRTEC) 10 MG tablet Take 1 tablet (10 mg total) by mouth  "once daily. for 10 days (Patient not taking: Reported on 4/18/2024) 30 tablet 11    omeprazole (PRILOSEC) 20 MG capsule Take 1 capsule (20 mg total) by mouth once daily. (Patient not taking: Reported on 4/18/2024) 30 capsule 0    [DISCONTINUED] albuterol (PROVENTIL/VENTOLIN HFA) 90 mcg/actuation inhaler INHALE ONE OR TWO PUFFS BY MOUTH EVERY FOUR TO SIX HOURS AS NEEDED 54 g 1     No current facility-administered medications on file prior to visit.     Review of Systems   Constitutional:  Negative for appetite change, chills, fatigue and fever.   HENT:  Positive for congestion and sore throat. Negative for rhinorrhea.    Eyes:  Negative for visual disturbance.   Respiratory:  Positive for cough, shortness of breath and wheezing.    Cardiovascular:  Negative for chest pain, palpitations and leg swelling.   Gastrointestinal:  Negative for abdominal pain, diarrhea and vomiting.   Genitourinary:  Negative for difficulty urinating, dysuria and hematuria.   Musculoskeletal:  Negative for arthralgias and myalgias.   Skin:  Negative for rash and wound.   Neurological:  Negative for dizziness and headaches.   Psychiatric/Behavioral:  Negative for behavioral problems. The patient is not nervous/anxious.      Vitals:    05/01/24 0855 05/01/24 0905   BP: 139/76    Pulse: 76    SpO2: (!) 81% (!) 92%   Weight: 64.7 kg (142 lb 9.6 oz)    Height: 5' 4" (1.626 m)      Wt Readings from Last 3 Encounters:   05/01/24 64.7 kg (142 lb 9.6 oz)   04/18/24 64.9 kg (143 lb 1.3 oz)   02/09/24 65.8 kg (145 lb)     Physical Exam  Vitals reviewed.   Constitutional:       General: She is not in acute distress.     Appearance: Normal appearance. She is not ill-appearing.   HENT:      Head: Normocephalic and atraumatic.      Right Ear: Tympanic membrane, ear canal and external ear normal.      Left Ear: Tympanic membrane, ear canal and external ear normal.      Nose: Congestion present.      Mouth/Throat:      Pharynx: Posterior oropharyngeal " erythema present.   Eyes:      Extraocular Movements: Extraocular movements intact.      Conjunctiva/sclera: Conjunctivae normal.   Cardiovascular:      Rate and Rhythm: Normal rate.      Heart sounds: Normal heart sounds.   Pulmonary:      Effort: Pulmonary effort is normal. No respiratory distress.      Breath sounds: Wheezing present.      Comments: Diminished breath sounds bilaterally  Oxygen 81% on RA. 2L NC applied and oxggen up to 92%.   She has home portable oxygen tank w/ her today   Abdominal:      General: Abdomen is flat. There is no distension.   Musculoskeletal:         General: Normal range of motion.      Cervical back: Normal range of motion and neck supple.   Lymphadenopathy:      Cervical: No cervical adenopathy.   Skin:     General: Skin is warm and dry.      Capillary Refill: Capillary refill takes less than 2 seconds.      Coloration: Skin is not pale.      Findings: No rash.   Neurological:      General: No focal deficit present.      Mental Status: She is alert and oriented to person, place, and time. Mental status is at baseline.   Psychiatric:         Mood and Affect: Mood normal.         Speech: Speech normal. Speech is not rapid and pressured, delayed or slurred.         Behavior: Behavior normal. Behavior is not agitated, slowed, aggressive, withdrawn, hyperactive or combative. Behavior is cooperative.         Thought Content: Thought content normal.         Judgment: Judgment normal.       Health Maintenance   Topic Date Due    Foot Exam  Never done    Colorectal Cancer Screening  08/22/2019    Mammogram  07/30/2020    DEXA Scan  07/30/2022    Lipid Panel  05/19/2023    Hemoglobin A1c  09/10/2023    Eye Exam  12/28/2023    High Dose Statin  05/01/2025    TETANUS VACCINE  05/27/2028    Hepatitis C Screening  Completed    Shingles Vaccine  Completed

## 2024-05-18 ENCOUNTER — PATIENT MESSAGE (OUTPATIENT)
Dept: FAMILY MEDICINE | Facility: CLINIC | Age: 74
End: 2024-05-18
Payer: MEDICARE

## 2024-05-24 ENCOUNTER — PATIENT OUTREACH (OUTPATIENT)
Dept: ADMINISTRATIVE | Facility: OTHER | Age: 74
End: 2024-05-24
Payer: MEDICARE

## 2024-05-30 ENCOUNTER — TELEPHONE (OUTPATIENT)
Dept: FAMILY MEDICINE | Facility: CLINIC | Age: 74
End: 2024-05-30
Payer: MEDICARE

## 2024-05-30 DIAGNOSIS — J44.0 CHRONIC OBSTRUCTIVE PULMONARY DISEASE WITH ACUTE LOWER RESPIRATORY INFECTION: ICD-10-CM

## 2024-05-30 DIAGNOSIS — Z79.899 ENCOUNTER FOR LONG-TERM (CURRENT) USE OF MEDICATIONS: ICD-10-CM

## 2024-05-30 RX ORDER — UMECLIDINIUM BROMIDE AND VILANTEROL TRIFENATATE 62.5; 25 UG/1; UG/1
POWDER RESPIRATORY (INHALATION)
Qty: 180 EACH | Refills: 1 | Status: SHIPPED | OUTPATIENT
Start: 2024-05-30

## 2024-05-30 NOTE — TELEPHONE ENCOUNTER
No care due was identified.  [facility] Embedded Care Due Messages. Reference number: [id number].   5/30/2024 3:59:41 AM CDT

## 2024-05-30 NOTE — TELEPHONE ENCOUNTER
Called pt regarding message that was left with our clinic that patient was having chest pain. Pt stated that she has been having chest pain for two week now. She stated that she refuses to go to the ER because there was nothing they could do for her. I advised patient that the ER will do STAT testing on her to ensure that she is safe and okay to wait for an appointment with Dr. Steinberg. I expressed that I was deeply concerned about this issue she was having due to her medical history. Patient stated she was not going to [place] and when her  got off work, she would have him bring her to the ER. I advised patient to call our clinic as soon as she is discharged from the hospital so we can get her in for an appointment here at our clinic with PCP. Patient verbalized understanding of this plan.   \    INEZ

## 2024-05-30 NOTE — TELEPHONE ENCOUNTER
Refill Routing Note   Medication(s) are not appropriate for processing by [facility] for the following reason(s):        Patient not seen by provider within 15 months    [facility] action(s):  Defer               Appointments  past 12m or future 3m with PCP    Date Provider   Last Visit   9/12/2022 Jose Steinberg MD   Next Visit   6/24/2024 Jose Steinberg MD   ED visits in past 90 days: 0        Note composed:8:31 AM 05/30/2024

## 2024-05-30 NOTE — TELEPHONE ENCOUNTER
----- Message from Harleen Verma sent at 5/30/2024 10:36 AM CDT -----  Contact: Self  Type:  Same Day Appointment Request    Caller is requesting a same day appointment.  Caller declined first available appointment listed below.      Name of Caller:  Patient  When is the first available appointment?  05/31  Symptoms:  bad chest pains, feeling weak and dizzy, almost feels like she may pass out  Best Call Back Number:  [phone]  Additional Information:   Can we please call pt back to assist. Thank You.

## 2024-06-03 ENCOUNTER — TELEPHONE (OUTPATIENT)
Dept: FAMILY MEDICINE | Facility: CLINIC | Age: 74
End: 2024-06-03
Payer: MEDICARE

## 2024-06-03 NOTE — TELEPHONE ENCOUNTER
----- Message from Lailaconchita Dawn sent at 6/3/2024 10:42 AM CDT -----  Contact: [phone]  Patient called in requesting a call back, she said both of her ears hurt, she is running a fever please call back  [phone]          [id number] [place] [facility] [address] AT [facility] [address] [address]  [address] [address]  Phone: [phone] Fax: [phone]

## 2024-06-24 ENCOUNTER — PATIENT OUTREACH (OUTPATIENT)
Dept: ADMINISTRATIVE | Facility: HOSPITAL | Age: 74
End: 2024-06-24
Payer: MEDICARE

## 2024-07-02 ENCOUNTER — PATIENT MESSAGE (OUTPATIENT)
Dept: FAMILY MEDICINE | Facility: CLINIC | Age: 74
End: 2024-07-02
Payer: MEDICARE

## 2024-07-02 DIAGNOSIS — E03.9 HYPOTHYROIDISM, UNSPECIFIED TYPE: ICD-10-CM

## 2024-07-02 DIAGNOSIS — E78.5 HYPERLIPIDEMIA, UNSPECIFIED HYPERLIPIDEMIA TYPE: ICD-10-CM

## 2024-07-02 DIAGNOSIS — Z79.899 ENCOUNTER FOR LONG-TERM (CURRENT) USE OF MEDICATIONS: ICD-10-CM

## 2024-07-02 RX ORDER — ATORVASTATIN CALCIUM 40 MG/1
40 TABLET, FILM COATED ORAL
Qty: 90 TABLET | Refills: 0 | Status: SHIPPED | OUTPATIENT
Start: 2024-07-02

## 2024-07-02 RX ORDER — LEVOTHYROXINE SODIUM 50 UG/1
TABLET ORAL
Qty: 90 TABLET | Refills: 0 | Status: SHIPPED | OUTPATIENT
Start: 2024-07-02

## 2024-07-02 NOTE — TELEPHONE ENCOUNTER
Care Due:                  Date            Visit Type   Department     Provider  --------------------------------------------------------------------------------                                EP -                              PRIMARY      [facility] FAMILY  Last Visit: 09-      CARE ([facility])   MEDICINE       Jose Steinberg  Next Visit: None Scheduled  None         None Found                                                            Last  Test          Frequency    Reason                     Performed    Due Date  --------------------------------------------------------------------------------    Office Visit  15 months..  amlodipine-benazepril,     09- 12-                             umeclidinium-vilanteroL..    [facility] Embedded Care Due Messages. Reference number: [id number].   7/02/2024 7:58:32 AM CDT

## 2024-07-02 NOTE — TELEPHONE ENCOUNTER
Refill Routing Note   Medication(s) are not appropriate for processing by [facility] for the following reason(s):        Patient not seen by provider within 15 months  Required labs outdated    [facility] action(s):  Defer   Requires appointment : Yes               Appointments  past 12m or future 3m with PCP    Date Provider   Last Visit   9/12/2022 Jose Steinberg MD   Next Visit   Visit date not found Jose Steinberg MD   ED visits in past 90 days: 0        Note composed:8:23 AM 07/02/2024

## 2024-07-23 ENCOUNTER — OFFICE VISIT (OUTPATIENT)
Dept: PULMONOLOGY | Facility: CLINIC | Age: 74
End: 2024-07-23
Payer: MEDICARE

## 2024-07-23 ENCOUNTER — HOSPITAL ENCOUNTER (OUTPATIENT)
Dept: RADIOLOGY | Facility: HOSPITAL | Age: 74
Discharge: HOME | End: 2024-07-23
Attending: INTERNAL MEDICINE
Payer: MEDICARE

## 2024-07-23 VITALS
RESPIRATION RATE: 24 BRPM | DIASTOLIC BLOOD PRESSURE: 76 MMHG | HEIGHT: 60 IN | HEART RATE: 90 BPM | SYSTOLIC BLOOD PRESSURE: 114 MMHG | OXYGEN SATURATION: 73 % | BODY MASS INDEX: 26.44 KG/M2 | WEIGHT: 134.69 LBS

## 2024-07-23 DIAGNOSIS — I10 HYPERTENSION, ESSENTIAL: Chronic | ICD-10-CM

## 2024-07-23 DIAGNOSIS — J44.9 STAGE 3 SEVERE COPD BY GOLD CLASSIFICATION: Primary | ICD-10-CM

## 2024-07-23 DIAGNOSIS — Z72.0 NICOTINE ABUSE: ICD-10-CM

## 2024-07-23 DIAGNOSIS — J96.11 CHRONIC HYPOXEMIC RESPIRATORY FAILURE: Chronic | ICD-10-CM

## 2024-07-23 DIAGNOSIS — R91.1 PULMONARY NODULE: ICD-10-CM

## 2024-07-23 DIAGNOSIS — E78.00 PURE HYPERCHOLESTEROLEMIA: ICD-10-CM

## 2024-07-23 DIAGNOSIS — J44.1 COPD EXACERBATION: ICD-10-CM

## 2024-07-23 DIAGNOSIS — E03.9 HYPOTHYROIDISM, UNSPECIFIED TYPE: ICD-10-CM

## 2024-07-23 PROCEDURE — 3288F FALL RISK ASSESSMENT DOCD: CPT | Mod: CPTII,S$GLB,, | Performed by: INTERNAL MEDICINE

## 2024-07-23 PROCEDURE — 71046 X-RAY EXAM CHEST 2 VIEWS: CPT | Mod: 26,,, | Performed by: RADIOLOGY

## 2024-07-23 PROCEDURE — 1160F RVW MEDS BY RX/DR IN RCRD: CPT | Mod: CPTII,S$GLB,, | Performed by: INTERNAL MEDICINE

## 2024-07-23 PROCEDURE — 3074F SYST BP LT 130 MM HG: CPT | Mod: CPTII,S$GLB,, | Performed by: INTERNAL MEDICINE

## 2024-07-23 PROCEDURE — 3078F DIAST BP <80 MM HG: CPT | Mod: CPTII,S$GLB,, | Performed by: INTERNAL MEDICINE

## 2024-07-23 PROCEDURE — 1159F MED LIST DOCD IN RCRD: CPT | Mod: CPTII,S$GLB,, | Performed by: INTERNAL MEDICINE

## 2024-07-23 PROCEDURE — 94640 AIRWAY INHALATION TREATMENT: CPT | Mod: 59,S$GLB,, | Performed by: INTERNAL MEDICINE

## 2024-07-23 PROCEDURE — 96372 THER/PROPH/DIAG INJ SC/IM: CPT | Mod: S$GLB,,, | Performed by: INTERNAL MEDICINE

## 2024-07-23 PROCEDURE — 3008F BODY MASS INDEX DOCD: CPT | Mod: CPTII,S$GLB,, | Performed by: INTERNAL MEDICINE

## 2024-07-23 PROCEDURE — 1101F PT FALLS ASSESS-DOCD LE1/YR: CPT | Mod: CPTII,S$GLB,, | Performed by: INTERNAL MEDICINE

## 2024-07-23 PROCEDURE — 71046 X-RAY EXAM CHEST 2 VIEWS: CPT | Mod: TC

## 2024-07-23 PROCEDURE — 1126F AMNT PAIN NOTED NONE PRSNT: CPT | Mod: CPTII,S$GLB,, | Performed by: INTERNAL MEDICINE

## 2024-07-23 PROCEDURE — 4010F ACE/ARB THERAPY RXD/TAKEN: CPT | Mod: CPTII,S$GLB,, | Performed by: INTERNAL MEDICINE

## 2024-07-23 PROCEDURE — 99214 OFFICE O/P EST MOD 30 MIN: CPT | Mod: 25,S$GLB,, | Performed by: INTERNAL MEDICINE

## 2024-07-23 PROCEDURE — 99999 PR PBB SHADOW E&M-EST. PATIENT-LVL V: CPT | Mod: PBBFAC,,, | Performed by: INTERNAL MEDICINE

## 2024-07-23 RX ORDER — ALBUTEROL SULFATE 1.25 MG/3ML
1.25 SOLUTION RESPIRATORY (INHALATION) 2 TIMES DAILY
Qty: 180 ML | Refills: 11 | Status: SHIPPED | OUTPATIENT
Start: 2024-07-23 | End: 2025-07-23

## 2024-07-23 RX ORDER — METHYLPREDNISOLONE 4 MG/1
TABLET ORAL
Qty: 21 TABLET | Refills: 0 | Status: SHIPPED | OUTPATIENT
Start: 2024-07-23

## 2024-07-23 RX ORDER — ALBUTEROL SULFATE 2.5 MG/.5ML
2.5 SOLUTION RESPIRATORY (INHALATION)
Status: COMPLETED | OUTPATIENT
Start: 2024-07-23 | End: 2024-07-23

## 2024-07-23 RX ORDER — TRIAMCINOLONE ACETONIDE 40 MG/ML
40 INJECTION, SUSPENSION INTRA-ARTICULAR; INTRAMUSCULAR ONCE
Status: COMPLETED | OUTPATIENT
Start: 2024-07-23 | End: 2024-07-23

## 2024-07-23 RX ORDER — LEVOFLOXACIN 500 MG/1
500 TABLET, FILM COATED ORAL DAILY
Qty: 7 TABLET | Refills: 0 | Status: SHIPPED | OUTPATIENT
Start: 2024-07-23 | End: 2024-07-30

## 2024-07-23 RX ADMIN — ALBUTEROL SULFATE 2.5 MG: 2.5 SOLUTION RESPIRATORY (INHALATION) at 02:07

## 2024-07-23 RX ADMIN — TRIAMCINOLONE ACETONIDE 40 MG: 40 INJECTION, SUSPENSION INTRA-ARTICULAR; INTRAMUSCULAR at 01:07
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
## 2024-07-27 DIAGNOSIS — I10 HYPERTENSION, ESSENTIAL: ICD-10-CM

## 2024-07-27 DIAGNOSIS — Z79.899 ENCOUNTER FOR LONG-TERM (CURRENT) USE OF MEDICATIONS: ICD-10-CM

## 2024-07-27 NOTE — TELEPHONE ENCOUNTER
Care Due:                  Date            Visit Type   Department     Provider  --------------------------------------------------------------------------------                                EP -                              PRIMARY      [facility]  Last Visit: 09-      CARE ([facility])   MEDICINE       Jose Steinberg  Next Visit: None Scheduled  None         None Found                                                            Last  Test          Frequency    Reason                     Performed    Due Date  --------------------------------------------------------------------------------    CMP.........  12 months..  atorvastatin.............  03-   03-    Lipid Panel.  12 months..  atorvastatin.............  05-   05-    [employer] Embedded Care Due Messages. Reference number: [id number].   7/27/2024 4:03:50 AM CDT

## 2024-07-29 RX ORDER — AMLODIPINE AND BENAZEPRIL HYDROCHLORIDE 10; 20 MG/1; MG/1
1 CAPSULE ORAL
Qty: 90 CAPSULE | Refills: 0 | Status: SHIPPED | OUTPATIENT
Start: 2024-07-29

## 2024-07-29 NOTE — TELEPHONE ENCOUNTER
Refill Routing Note   Medication(s) are not appropriate for processing by [facility] for the following reason(s):        Patient not seen by provider within 15 months    [facility] action(s):  Defer   Requires labs : Yes               Appointments  past 12m or future 3m with PCP    Date Provider   Last Visit   9/12/2022 Jose Steinberg MD   Next Visit   Visit date not found Jose Steinberg MD   ED visits in past 90 days: 0        Note composed:7:05 AM 07/29/2024

## 2024-07-31 ENCOUNTER — PATIENT MESSAGE (OUTPATIENT)
Dept: RESEARCH | Facility: HOSPITAL | Age: 74
End: 2024-07-31
Payer: MEDICARE

## 2024-09-11 ENCOUNTER — PATIENT MESSAGE (OUTPATIENT)
Dept: FAMILY MEDICINE | Facility: CLINIC | Age: 74
End: 2024-09-11
Payer: MEDICARE

## 2024-10-17 ENCOUNTER — PATIENT OUTREACH (OUTPATIENT)
Dept: ADMINISTRATIVE | Facility: HOSPITAL | Age: 74
End: 2024-10-17
Payer: MEDICARE

## 2024-10-17 DIAGNOSIS — R06.02 SHORTNESS OF BREATH: Chronic | ICD-10-CM

## 2024-10-17 DIAGNOSIS — J44.9 STAGE 3 SEVERE COPD BY GOLD CLASSIFICATION: ICD-10-CM

## 2024-10-17 DIAGNOSIS — E11.65 TYPE 2 DIABETES MELLITUS WITH HYPERGLYCEMIA, WITHOUT LONG-TERM CURRENT USE OF INSULIN: Primary | Chronic | ICD-10-CM

## 2024-10-17 DIAGNOSIS — I10 HYPERTENSION, ESSENTIAL: Chronic | ICD-10-CM

## 2024-10-17 NOTE — PROGRESS NOTES
VBC Program activation, Spoke with Pt who informs she does not want any screenings at this time but will complete overdue Labs. Pt says she will call to schedule her Eye Exam with Dr. Reilly in [place] at a later date. Patient gives me a new phone number & is requesting an inhaler to be sent to [facility] in [place] due to [facility] being closed. Chart routed to Pulmonary.

## 2024-10-17 NOTE — Clinical Note
Patient is requesting an inhaler says she has 5-6 puffs left and will run out before her next visit. Would like it to go to [facility] in [place]

## 2024-10-18 DIAGNOSIS — Z79.899 ENCOUNTER FOR LONG-TERM (CURRENT) USE OF MEDICATIONS: ICD-10-CM

## 2024-10-18 DIAGNOSIS — J44.0 CHRONIC OBSTRUCTIVE PULMONARY DISEASE WITH ACUTE LOWER RESPIRATORY INFECTION: ICD-10-CM

## 2024-10-18 DIAGNOSIS — R06.09 DOE (DYSPNEA ON EXERTION): Primary | ICD-10-CM

## 2024-10-18 RX ORDER — UMECLIDINIUM BROMIDE AND VILANTEROL TRIFENATATE 62.5; 25 UG/1; UG/1
POWDER RESPIRATORY (INHALATION)
Qty: 180 EACH | Refills: 1 | Status: SHIPPED | OUTPATIENT
Start: 2024-10-18

## 2024-10-18 RX ORDER — ALBUTEROL SULFATE 90 UG/1
INHALANT RESPIRATORY (INHALATION)
Qty: 54 G | Refills: 3 | Status: SHIPPED | OUTPATIENT
Start: 2024-10-18

## 2024-10-18 NOTE — TELEPHONE ENCOUNTER
Care Due:                  Date            Visit Type   Department     Provider  --------------------------------------------------------------------------------    Last Visit: None Found      None         None Found  Next Visit: None Scheduled  None         None Found                                                            Last  Test          Frequency    Reason                     Performed    Due Date  --------------------------------------------------------------------------------    Office Visit  15 months..  atorvastatin,              Not Found    Overdue                             umeclidinium-vilanteroL..    CMP.........  12 months..  atorvastatin.............  03-   03-    Lipid Panel.  12 months..  atorvastatin.............  05-   05-    [facility] Embedded Care Due Messages. Reference number: [id number].   10/18/2024 9:25:01 AM CDT

## 2024-10-19 RX ORDER — ALBUTEROL SULFATE 90 UG/1
INHALANT RESPIRATORY (INHALATION)
Qty: 54 G | Refills: 0 | OUTPATIENT
Start: 2024-10-19

## 2024-10-19 NOTE — TELEPHONE ENCOUNTER
Refill Decision Note   Kami Higgins  is requesting a refill authorization.  Brief Assessment and Rationale for Refill:  Quick Discontinue     Medication Therapy Plan:  Receipt confirmed by pharmacy (10/18/2024  1:20 PM CDT      Comments:     Note composed:12:01 PM 10/19/2024

## 2024-10-25 ENCOUNTER — TELEPHONE (OUTPATIENT)
Dept: PULMONOLOGY | Facility: CLINIC | Age: 74
End: 2024-10-25
Payer: MEDICARE

## 2024-10-25 DIAGNOSIS — I10 HYPERTENSION, ESSENTIAL: ICD-10-CM

## 2024-10-25 DIAGNOSIS — Z79.899 ENCOUNTER FOR LONG-TERM (CURRENT) USE OF MEDICATIONS: ICD-10-CM

## 2024-10-25 NOTE — TELEPHONE ENCOUNTER
----- Message from Alphonse sent at 10/25/2024  9:02 AM CDT -----  Contact: [phone]  Type:  RX Refill Request    Who Called: SHAWNA FERNANDES [[id number]]  Refill or New Rx:refill  RX Name and Strength:umeclidinium-vilanteroL (ANORO ELLIPTA) 62.5-25 mcg/actuation DsDv/    amlodipine-benazepril 10-20mg (LOTREL) 10-20 mg per capsule  How is the patient currently taking it? (ex. 1XDay):  Is this a 30 day or 90 day RX:  Preferred Pharmacy with phone number: [phone] Fax: [phone]    Local or Mail Order:local  Ordering Provider:jackson  Would the patient rather a call back or a response via [facility]? Call back  Best Call Back Number:[phone]  Additional Information:mrn  [id number]        [id number] - [place] - [address] AT [facility] [address] [address]  [address] [facility] [place] [address]  Phone: [phone] Fax: [phone]

## 2024-10-28 ENCOUNTER — PATIENT OUTREACH (OUTPATIENT)
Dept: ADMINISTRATIVE | Facility: HOSPITAL | Age: 74
End: 2024-10-28
Payer: MEDICARE

## 2024-10-28 RX ORDER — AMLODIPINE AND BENAZEPRIL HYDROCHLORIDE 10; 20 MG/1; MG/1
1 CAPSULE ORAL
Qty: 90 CAPSULE | Refills: 0 | Status: SHIPPED | OUTPATIENT
Start: 2024-10-28

## 2024-10-30 ENCOUNTER — TELEPHONE (OUTPATIENT)
Dept: PULMONOLOGY | Facility: CLINIC | Age: 74
End: 2024-10-30
Payer: MEDICARE

## 2024-11-01 ENCOUNTER — PATIENT OUTREACH (OUTPATIENT)
Dept: ADMINISTRATIVE | Facility: OTHER | Age: 74
End: 2024-11-01
Payer: MEDICARE

## 2024-11-01 NOTE — PROGRESS NOTES
CHW - Outreach Attempt    LPN briefly spoke with patient. Currently driving unable to talk now. 1st attempt for initial outreach to contact patient regarding: Referral for food insecurities. Asked that I call her back on Monday.  Community Health Worker / LPN to attempt to contact patient on: 11/04/24

## 2024-11-04 NOTE — PROGRESS NOTES
CHW - Outreach Attempt    LPN unable to leave a voicemail message for 2nd attempt to contact patient regarding: PCP referral--Food insecurities  Community Health Worker / LPN to attempt to contact patient on: 11/07/24

## 2024-11-07 NOTE — PROGRESS NOTES
CHW - Case Closure    This LPN unable to speak to patient via telephone today. 11/07/24    3 Failed attempts for initial outreach.     At this time  this episode is closed.

## 2024-11-07 NOTE — PROGRESS NOTES
CHW - Outreach Attempt      LPN unable to leave a voicemail message for 3rd attempt on initial outreach to contact patient regarding: Referral for Food Insecurities    3 unsuccessful attempts for initial outreach.

## 2024-11-08 ENCOUNTER — PATIENT OUTREACH (OUTPATIENT)
Dept: ADMINISTRATIVE | Facility: HOSPITAL | Age: 74
End: 2024-11-08
Payer: MEDICARE

## 2024-11-17 ENCOUNTER — PATIENT MESSAGE (OUTPATIENT)
Dept: FAMILY MEDICINE | Facility: CLINIC | Age: 74
End: 2024-11-17
Payer: MEDICARE

## 2024-11-27 ENCOUNTER — TELEPHONE (OUTPATIENT)
Dept: FAMILY MEDICINE | Facility: CLINIC | Age: 74
End: 2024-11-27
Payer: MEDICARE

## 2024-11-27 NOTE — TELEPHONE ENCOUNTER
----- Message from Shawna sent at 11/27/2024  7:41 AM CST -----  Contact: SELF   .Type: Patient Call Back        Who called:   PATIENT      What is the request in detail:    CALLED IN CONCERNING GETTING AN APPT TODAY . PATIENT SAYS HER THROAT FEELS IRRITATED . PATIENT WOULD EITHER LIKE TO BE SEEN TODAY IN PERSON OR VIRTUAL . PLEASE CALL BACK   Can the clinic reply by [facility]?           Would the patient rather a call back or a response via [facility]?   CALL     Best call back number:  [phone]

## 2024-11-27 NOTE — TELEPHONE ENCOUNTER
Call returned. Pt stated that she had an emergency and was unable to log on to her VV that was scheduled for 11:20. Pt was advised that I can reschedule her to see Dr. Sandoval VV at 2:00pm. Pt was advised to log onto her pt portal 10 mins prior to her apt time. Pt verbalized understanding.

## 2024-11-27 NOTE — TELEPHONE ENCOUNTER
----- Message from Nurse Campbell sent at 11/27/2024 11:46 AM CST -----  Contact: Brandyn/Relative    ----- Message -----  From: Lindsay Ricketts  Sent: 11/27/2024  11:45 AM CST  To: [facility] Clinical Staff    Patient's grandson is calling to speak with someone regarding visit. Patient's grandson reports having difficulty accessing patient's virtual visit for today and request assistance. Please give Mr. Ahumada a call back at [phone] to assist.  Thank you,  GH

## 2024-11-29 ENCOUNTER — TELEPHONE (OUTPATIENT)
Dept: FAMILY MEDICINE | Facility: CLINIC | Age: 74
End: 2024-11-29
Payer: MEDICARE

## 2024-11-29 NOTE — TELEPHONE ENCOUNTER
----- Message from Stir sent at 11/29/2024  9:06 AM CST -----  Contact: Kami  Type:  Needs Medical Advice    Who Called: Kami  Symptoms (please be specific): ears infected  How long has patient had these symptoms:  /  Pharmacy name and phone #:    [id number] - [place], [place] - [address] AT [facility] OF [address] [address]  [address] [facility] [address]  Phone: [phone] Fax: [phone]     Would the patient rather a call back or a response via [facility]? call  Best Call Back Number: [phone]   Additional Information: pt would like to an antibiotic called in

## 2024-11-29 NOTE — TELEPHONE ENCOUNTER
----- Message from Alphonse sent at 11/29/2024  4:02 PM CST -----  Contact: [phone]  Type:  Patient Returning Call    Who Called:SHAWNA FERNANDES [[id number]]  Who Left Message for Patient:Fernando Samuel LPN  Does the patient know what this is regarding?:missed a call from your office  Would the patient rather a call back or a response via [facility]? Call back  Best Call Back Number: [phone]  Additional Information: mrn [id number]

## 2024-11-29 NOTE — TELEPHONE ENCOUNTER
Pt informed visit needed for medication to be sent in, she is not able to do virtual or my chart without assistance from family and they are having problems, advised to contact pcp staff Monday for appt advise as she refuses np, er or uc.

## 2024-12-05 ENCOUNTER — LAB VISIT (OUTPATIENT)
Dept: LAB | Facility: HOSPITAL | Age: 74
End: 2024-12-05
Attending: NURSE PRACTITIONER
Payer: MEDICARE

## 2024-12-05 ENCOUNTER — OFFICE VISIT (OUTPATIENT)
Dept: FAMILY MEDICINE | Facility: CLINIC | Age: 74
End: 2024-12-05
Payer: MEDICARE

## 2024-12-05 VITALS
SYSTOLIC BLOOD PRESSURE: 124 MMHG | HEART RATE: 87 BPM | RESPIRATION RATE: 16 BRPM | DIASTOLIC BLOOD PRESSURE: 76 MMHG | BODY MASS INDEX: 22 KG/M2 | OXYGEN SATURATION: 90 % | TEMPERATURE: 98 F | WEIGHT: 128.88 LBS | HEIGHT: 64 IN

## 2024-12-05 DIAGNOSIS — E03.9 HYPOTHYROIDISM, UNSPECIFIED TYPE: ICD-10-CM

## 2024-12-05 DIAGNOSIS — Z79.899 ENCOUNTER FOR LONG-TERM (CURRENT) USE OF MEDICATIONS: ICD-10-CM

## 2024-12-05 DIAGNOSIS — J98.8 BACTERIAL RESPIRATORY INFECTION: Primary | ICD-10-CM

## 2024-12-05 DIAGNOSIS — J02.9 SORE THROAT: ICD-10-CM

## 2024-12-05 DIAGNOSIS — J44.9 STAGE 3 SEVERE COPD BY GOLD CLASSIFICATION: ICD-10-CM

## 2024-12-05 DIAGNOSIS — Z12.83 SKIN CANCER SCREENING: ICD-10-CM

## 2024-12-05 DIAGNOSIS — E11.65 TYPE 2 DIABETES MELLITUS WITH HYPERGLYCEMIA, WITHOUT LONG-TERM CURRENT USE OF INSULIN: Chronic | ICD-10-CM

## 2024-12-05 DIAGNOSIS — Z53.20 COLONOSCOPY REFUSED: ICD-10-CM

## 2024-12-05 DIAGNOSIS — B96.89 BACTERIAL RESPIRATORY INFECTION: Primary | ICD-10-CM

## 2024-12-05 DIAGNOSIS — J96.11 CHRONIC HYPOXEMIC RESPIRATORY FAILURE: Chronic | ICD-10-CM

## 2024-12-05 LAB
ALBUMIN SERPL BCP-MCNC: 3.8 G/DL (ref 3.5–5.2)
ALP SERPL-CCNC: 69 U/L (ref 40–150)
ALT SERPL W/O P-5'-P-CCNC: 11 U/L (ref 10–44)
ANION GAP SERPL CALC-SCNC: 10 MMOL/L (ref 8–16)
AST SERPL-CCNC: 14 U/L (ref 10–40)
BILIRUB SERPL-MCNC: 0.8 MG/DL (ref 0.1–1)
BUN SERPL-MCNC: 19 MG/DL (ref 8–23)
CALCIUM SERPL-MCNC: 9.5 MG/DL (ref 8.7–10.5)
CHLORIDE SERPL-SCNC: 102 MMOL/L (ref 95–110)
CHOLEST SERPL-MCNC: 218 MG/DL (ref 120–199)
CHOLEST/HDLC SERPL: 5.1 {RATIO} (ref 2–5)
CO2 SERPL-SCNC: 25 MMOL/L (ref 23–29)
CREAT SERPL-MCNC: 0.7 MG/DL (ref 0.5–1.4)
CTP QC/QA: YES
ERYTHROCYTE [DISTWIDTH] IN BLOOD BY AUTOMATED COUNT: 13.7 % (ref 11.5–14.5)
EST. GFR  (NO RACE VARIABLE): >60 ML/MIN/1.73 M^2
ESTIMATED AVG GLUCOSE: 111 MG/DL (ref 68–131)
GLUCOSE SERPL-MCNC: 78 MG/DL (ref 70–110)
HBA1C MFR BLD: 5.5 % (ref 4–5.6)
HCT VFR BLD AUTO: 52.4 % (ref 37–48.5)
HDLC SERPL-MCNC: 43 MG/DL (ref 40–75)
HDLC SERPL: 19.7 % (ref 20–50)
HGB BLD-MCNC: 17.1 G/DL (ref 12–16)
LDLC SERPL CALC-MCNC: 149.8 MG/DL (ref 63–159)
MCH RBC QN AUTO: 32.7 PG (ref 27–31)
MCHC RBC AUTO-ENTMCNC: 32.6 G/DL (ref 32–36)
MCV RBC AUTO: 100 FL (ref 82–98)
NONHDLC SERPL-MCNC: 175 MG/DL
PLATELET # BLD AUTO: 185 K/UL (ref 150–450)
PMV BLD AUTO: 10.9 FL (ref 9.2–12.9)
POTASSIUM SERPL-SCNC: 5.2 MMOL/L (ref 3.5–5.1)
PROT SERPL-MCNC: 7.3 G/DL (ref 6–8.4)
RBC # BLD AUTO: 5.23 M/UL (ref 4–5.4)
S PYO RRNA THROAT QL PROBE: NEGATIVE
SODIUM SERPL-SCNC: 137 MMOL/L (ref 136–145)
TRIGL SERPL-MCNC: 126 MG/DL (ref 30–150)
TSH SERPL DL<=0.005 MIU/L-ACNC: 0.68 UIU/ML (ref 0.4–4)
WBC # BLD AUTO: 7.79 K/UL (ref 3.9–12.7)

## 2024-12-05 PROCEDURE — 36415 COLL VENOUS BLD VENIPUNCTURE: CPT | Mod: PO | Performed by: NURSE PRACTITIONER

## 2024-12-05 PROCEDURE — 80061 LIPID PANEL: CPT | Performed by: NURSE PRACTITIONER

## 2024-12-05 PROCEDURE — 87880 STREP A ASSAY W/OPTIC: CPT | Mod: QW,S$GLB,, | Performed by: NURSE PRACTITIONER

## 2024-12-05 PROCEDURE — 84443 ASSAY THYROID STIM HORMONE: CPT | Performed by: NURSE PRACTITIONER

## 2024-12-05 PROCEDURE — 99999 PR PBB SHADOW E&M-EST. PATIENT-LVL V: CPT | Mod: PBBFAC,,, | Performed by: NURSE PRACTITIONER

## 2024-12-05 PROCEDURE — 80053 COMPREHEN METABOLIC PANEL: CPT | Performed by: NURSE PRACTITIONER

## 2024-12-05 PROCEDURE — 83036 HEMOGLOBIN GLYCOSYLATED A1C: CPT | Performed by: NURSE PRACTITIONER

## 2024-12-05 PROCEDURE — 85027 COMPLETE CBC AUTOMATED: CPT | Performed by: NURSE PRACTITIONER

## 2024-12-05 RX ORDER — PREDNISONE 10 MG/1
10 TABLET ORAL 2 TIMES DAILY
Qty: 10 TABLET | Refills: 0 | Status: SHIPPED | OUTPATIENT
Start: 2024-12-05

## 2024-12-05 RX ORDER — DOXYCYCLINE 100 MG/1
100 CAPSULE ORAL EVERY 12 HOURS
Qty: 14 CAPSULE | Refills: 0 | Status: SHIPPED | OUTPATIENT
Start: 2024-12-05

## 2024-12-05 NOTE — PROGRESS NOTES
Assessment/Plan:  Problem List Items Addressed This Visit          Psychiatric    Encounter for long-term (current) use of medications (Chronic)    Overview     December 2024: reviewed labs.  May 2024: reviewed labs.  September 2022: Reviewed labs.  07/30/2019 \CHRONIC long-term drug therapy for managed conditions. See medication list. Reports compliance.  No side effects reported.  Routine lab work is being monitored.  Patient does  need refills today.   Updating labs today.  Requesting records from [facility].  CHRONIC. Stable. Compliant with medications for managed conditions. See medication list. No SE reported.   Routine lab analysis is being monitored. Refills were addressed.  Lab Results   Component Value Date    WBC 8.76 08/30/2023    HGB 17.5 (H) 08/30/2023    HCT 52.9 (H) 08/30/2023    MCV 99 (H) 08/30/2023     08/30/2023         Chemistry        Component Value Date/Time     01/26/2024 0912    K 4.6 01/26/2024 0912     01/26/2024 0912    CO2 28 01/26/2024 0912    CO2 8 06/22/2022 1205    BUN 26 (H) 01/26/2024 0912    CREATININE 0.8 01/26/2024 0912    GLU 86 01/26/2024 0912        Component Value Date/Time    CALCIUM 9.4 01/26/2024 0912    ALKPHOS 90 03/10/2023 1316    AST 15 03/10/2023 1316    ALT 10 03/10/2023 1316    BILITOT 0.7 03/10/2023 1316    ESTGFRAFRICA >60.0 06/22/2022 1205    EGFRNONAA >60.0 06/22/2022 1205          Lab Results   Component Value Date    TSH 2.611 05/19/2022    K7YYDSB 6.4 07/30/2019    FREET4 0.75 05/19/2022    T3FREE 2.5 05/19/2022              Current Assessment & Plan     Update labs. Complete history and physical was completed today.  Complete and thorough medication reconciliation was performed.  Discussed risks and benefits of medications.  Advised patient on orders and health maintenance.  We discussed old records and old labs if available.  Will request any records not available through epic.  Continue current medications listed on your summary  sheet.         Relevant Orders    CBC Without Differential    Comprehensive Metabolic Panel    TSH    Hemoglobin A1C    Lipid Panel       Derm    Skin cancer screening    Overview     Patient has some various skin concerns/spots. She does not currently have a dermatologist.          Current Assessment & Plan     Referral to dermatology for routine skin check.          Relevant Orders    Ambulatory referral/consult to Dermatology       Pulmonary    Chronic hypoxemic respiratory failure (Chronic)    Overview     Chronic. December 2024: here without portable oxygen in use. FiO2 90%. She is wheezing on exam.     September 2022: Patient presents to clinic today off of her oxygen.  Patient states that she went without power last night.  She states that her oxygen saturation normally runs in the 80 percentile.  She does have portable oxygen by chooses not to use it.  She is not currently established with Pulmonary and needs a another referral.  Her her previous pulmonologist was in [place].    Previous history:  Uncontrolled.  Patient is normally on continuous oxygen portable 2liters.  She reports that she ran out of oxygen and was unable to get refills.         Current Assessment & Plan     Continue inhalers. Monitor oxygen levels. Supplemental oxygen for levels < 90%. Closely follow up with pulm. ER precautions. Start prednisone and doxy for acute lower respiratory s/s.          Stage 3 severe COPD by GOLD classification    Overview     Chronic.  Intermittent control. Following with pulmonology. Using albuterol inhaler PRN and Anoro daily. On supplemental oxygen.     Previous HPI:  Control uncertain.  Patient takes albuterol inhaler and also has a nebulizer at home.  Patient sees pulmonology.  Patient is supposed to be on oxygen.    May 2022:  Patient reports to me that she ran out of her oxygen.  She had a change in insurance and the previous company will not continue to provide her oxygen.  She needs a new order.   "Her pulmonologist also retired so she needs a referral to Pulmonary.  She continues to use inhaler sparingly.  She continues to smoke.  She has not had the COVID vaccine.  She is due for pneumonia vaccine.         Current Assessment & Plan     She does not have her portable oxygen tank with her. FiO2 90% today. Wheezing on exam. Start prednisone and doxy for lower respiratory s/s. Continue inhalers. Close follow up with pulmonology. Stressed smoking cessation. ER precautions.          Relevant Medications    doxycycline (VIBRAMYCIN) 100 MG Cap    predniSONE (DELTASONE) 10 MG tablet    Other Relevant Orders    Ambulatory referral/consult to Pulmonology       Endocrine    Type 2 diabetes mellitus with hyperglycemia, without long-term current use of insulin (Chronic)    Overview     December 2024: she admits inconsistent use of metformin.     Diabetes Medications               metFORMIN (GLUCOPHAGE) 1000 MG tablet TAKE 1 TABLET(1000 MG) BY MOUTH TWICE DAILY WITH MEALS     07/30/2019 Chronic. Stable. No results found for: LABA1C, HGBA1C Patient taking metforminMed Compliance:  Abby Floyd?  Dr. Royce Reilly in [facility] exam?  [id number]-gh-[id number] Vaccine?  Unsure  May 2022:   Diabetes Management Status    Statin: Taking  ACE/ARB: Taking    Screening or Prevention Patient's value Goal Complete/Controlled?   HgA1C Testing and Control   Lab Results   Component Value Date    HGBA1C 6.1 (H) 03/10/2023      Annually/Less than 8% Yes   Lipid profile : 05/19/2022 Annually Yes   LDL control Lab Results   Component Value Date    LDLCALC 69.4 05/19/2022    Annually/Less than 100 mg/dl  No   Nephropathy screening Lab Results   Component Value Date    LABMICR 251.0 05/19/2022     Lab Results   Component Value Date    PROTEINUA 1+ (A) 09/09/2022     No results found for: "UTPCR"   Annually No   Blood pressure BP Readings from Last 1 Encounters:   12/05/24 124/76    Less than 140/90 Yes   Dilated retinal exam : 12/28/2022 Annually " No   Foot exam   Most Recent Foot Exam Date: Not Found Annually No              Current Assessment & Plan     Update A1C. MARIO ALBERTO Dr. Arroyo in [place] for eye exam; reports visit within the last year. She declined foot exam today. Referral to podiatry placed.    Plan to monitor hemoglobin A1C at designated intervals 3-6 months.  I recommend ongoing Education for diabetic diet and exercise protocol. Follow up with Ophthalmology/Optometry and Podiatry at least annually.          Relevant Orders    Ambulatory referral/consult to Podiatry    Hypothyroidism    Overview     Chronic.  Stable.  No symptoms currently. Patient on levothyroxine 50 mcg.   Reports compliance.  No side effects.    Lab Results   Component Value Date    TSH 2.611 05/19/2022            Current Assessment & Plan     Update labs. Continue synthroid. Please be advised of hypothyroidism disease course.  We will plan to monitor thyroid labs at routine intervals.  Please be advised of risks and benefits of medication use, see medication insert for complete details.  ER precautions            GI    Colonoscopy refused    Overview     History of positive cologuard in the past. She is due for colonoscopy and declines scheduling. Discussed risk vs benefit.           Other Visit Diagnoses       Bacterial respiratory infection    -  Primary    Relevant Medications    doxycycline (VIBRAMYCIN) 100 MG Cap    predniSONE (DELTASONE) 10 MG tablet    Sore throat        Relevant Orders    POCT Rapid Strep A (Completed)        Negative strep  Start doxycycline and prednisone as prescribed  Continue inhalers  Recommend close follow up with pulmonology   Follow up in about 3 months (around 3/5/2025), or if symptoms worsen or fail to improve, for follow up with PCP.  ER precautions for severe or worsening symptoms.     Keiko Broderick  NP  _____________________________________________________________________________________________________________________________________________________    CC: sore throat     HPI: Patient is a 73-year-old female who presents in clinic today as an established patient here for sore throat. She reports a sore throat persisting for about three weeks. She reports possible low-grade fever, for which she has been taking Tylenol. She also has congestion, postnasal drip, wheezing (chronic), shortness of breath (chronic). There is no chest pain, palpitations. She uses oxygen at night as needed and has a portable oxygen tank for use during the day. She has a history of COPD. She uses inhalers daily and PRN and reports that weather changes can affect her breathing. She currently smokes about 6 cigarettes a day, having reduced from 1 PPD in recent weeks. She has a pulmonologist but reports it is difficulty to go to [facility]. She would like a referral to a [facility]. She reports being around grandkids that may have had strep. No other known sick contacts.     She has a history of diabetes and reports taking metformin less frequently. She is due for an eye exam. She reports that her last eye exam was in March with Dr. Arroyo in [place]. MARIO ALBERTO completed to request records. She only needs regular reading glasses for vision correction. She is due for a foot exam as well. She declines to have this done today.     FAMILY HISTORY:  Her sister passed away three months ago, with a history of COPD and liver failure likely due to alcohol and drug use, resulting in cirrhosis.    Past Medical History:  Past Medical History:   Diagnosis Date    AAA (abdominal aortic aneurysm) without rupture     COPD (chronic obstructive pulmonary disease)     Hyperlipidemia     Hypertension     Pulmonary nodule     Thyroid disease      Past Surgical History:   Procedure Laterality Date    ABDOMINAL AORTIC ANEURYSM REPAIR      cardiac  catheterization  2017    HYSTERECTOMY      THYROIDECTOMY      TUBAL LIGATION       Review of patient's allergies indicates:   Allergen Reactions    Aspirin, buffered     Codeine     Penicillins Hives    Singulair [montelukast]      Social History     Tobacco Use    Smoking status: Some Days     Current packs/day: 0.50     Types: Cigarettes    Smokeless tobacco: Never   Substance Use Topics    Alcohol use: Never    Drug use: Never     Family History   Problem Relation Name Age of Onset    Colon cancer Neg Hx      Stomach cancer Neg Hx      Esophageal cancer Neg Hx       Current Outpatient Medications on File Prior to Visit   Medication Sig Dispense Refill    albuterol (ACCUNEB) 1.25 mg/3 mL Nebu Take 3 mLs (1.25 mg total) by nebulization 2 (two) times a day. Rescue 180 mL 11    albuterol (PROVENTIL/VENTOLIN HFA) 90 mcg/actuation inhaler INHALE 1 OR 2 PUFFS BY MOUTH EVERY 4 TO 6 HOURS AS NEEDED 54 g 3    amlodipine-benazepril 10-20mg (LOTREL) 10-20 mg per capsule TAKE 1 CAPSULE BY MOUTH EVERY DAY 90 capsule 0    b complex vitamins capsule Take 1 capsule by mouth once daily.      ketorolac 0.5% (ACULAR) 0.5 % Drop Place into both eyes.      levothyroxine (SYNTHROID) 50 MCG tablet TAKE 1 TABLET(50 MCG) BY MOUTH EVERY MORNING 90 tablet 0    metFORMIN (GLUCOPHAGE) 1000 MG tablet TAKE 1 TABLET(1000 MG) BY MOUTH TWICE DAILY WITH MEALS (Patient taking differently: Take 1,000 mg by mouth once.) 180 tablet 0    multivitamin capsule Take 1 capsule by mouth once daily.      ondansetron (ZOFRAN-ODT) 4 MG TbDL Take by mouth.      prednisoLONE acetate (PRED FORTE) 1 % DrpS Place into both eyes.      umeclidinium-vilanteroL (ANORO ELLIPTA) 62.5-25 mcg/actuation DsDv INHALE 1 PUFF BY MOUTH ONCE DAILY AS DIRECTED 180 each 1    [DISCONTINUED] ofloxacin (OCUFLOX) 0.3 % ophthalmic solution Apply 5 drops into each ear twice daily for 7 days 1 each 0    atorvastatin (LIPITOR) 40 MG tablet TAKE 1 TABLET(40 MG) BY MOUTH EVERY DAY (Patient  "not taking: Reported on 12/5/2024) 90 tablet 0    calcium carbonate (OS-FREDY) 500 mg calcium (1,250 mg) tablet Take 1 tablet (500 mg total) by mouth once daily. 360 tablet 0    omeprazole (PRILOSEC) 20 MG capsule Take 1 capsule (20 mg total) by mouth once daily. 30 capsule 0    [DISCONTINUED] calcium-vitamin D3 (OS-FREDY 500 + D3) 500 mg(1,250mg) -200 unit per tablet TAKE ONE TABLET BY MOUTH DAILY  360 tablet 11    [DISCONTINUED] cetirizine (ZYRTEC) 10 MG tablet Take 1 tablet (10 mg total) by mouth once daily. for 10 days (Patient not taking: Reported on 4/18/2024) 30 tablet 11    [DISCONTINUED] doxycycline (VIBRAMYCIN) 100 MG Cap Take 1 capsule (100 mg total) by mouth every 12 (twelve) hours. (Patient not taking: Reported on 12/5/2024) 20 capsule 0    [DISCONTINUED] methylPREDNISolone (MEDROL DOSEPACK) 4 mg tablet use as directed (Patient not taking: Reported on 12/5/2024) 21 tablet 0     No current facility-administered medications on file prior to visit.     Review of Systems   Constitutional:  Negative for appetite change, chills, fatigue and fever.   HENT:  Positive for congestion and sore throat. Negative for rhinorrhea.    Eyes:  Negative for visual disturbance.   Respiratory:  Positive for cough, shortness of breath and wheezing.    Cardiovascular:  Negative for chest pain, palpitations and leg swelling.   Gastrointestinal:  Negative for abdominal pain, diarrhea and vomiting.   Genitourinary:  Negative for difficulty urinating, dysuria and hematuria.   Musculoskeletal:  Negative for arthralgias and myalgias.   Skin:  Negative for rash and wound.   Neurological:  Negative for dizziness and headaches.   Psychiatric/Behavioral:  Negative for behavioral problems. The patient is not nervous/anxious.      Vitals:    12/05/24 1010   BP: 124/76   Pulse: 87   Resp: 16   Temp: 98.3 °F (36.8 °C)   TempSrc: Oral   SpO2: (!) 90%   Weight: 58.5 kg (128 lb 14.4 oz)   Height: 5' 4" (1.626 m)     Wt Readings from Last 3 " Encounters:   12/05/24 58.5 kg (128 lb 14.4 oz)   07/23/24 61.1 kg (134 lb 11.2 oz)   05/01/24 64.7 kg (142 lb 9.6 oz)     Physical Exam  Vitals reviewed.   Constitutional:       General: She is not in acute distress.     Appearance: Normal appearance. She is not ill-appearing.   HENT:      Head: Normocephalic and atraumatic.      Right Ear: Tympanic membrane, ear canal and external ear normal.      Left Ear: Tympanic membrane, ear canal and external ear normal.      Nose: Congestion present.      Mouth/Throat:      Pharynx: Posterior oropharyngeal erythema present.   Eyes:      Extraocular Movements: Extraocular movements intact.      Conjunctiva/sclera: Conjunctivae normal.   Cardiovascular:      Rate and Rhythm: Normal rate.      Heart sounds: Normal heart sounds.   Pulmonary:      Effort: Pulmonary effort is normal. No respiratory distress.      Breath sounds: Wheezing present.   Abdominal:      General: Abdomen is flat. There is no distension.   Musculoskeletal:         General: Normal range of motion.      Cervical back: Normal range of motion and neck supple.   Lymphadenopathy:      Cervical: No cervical adenopathy.   Skin:     General: Skin is warm and dry.      Capillary Refill: Capillary refill takes less than 2 seconds.      Coloration: Skin is not pale.      Findings: No rash.   Neurological:      General: No focal deficit present.      Mental Status: She is alert and oriented to person, place, and time. Mental status is at baseline.   Psychiatric:         Mood and Affect: Mood normal.         Speech: Speech normal. Speech is not rapid and pressured, delayed or slurred.         Behavior: Behavior normal. Behavior is not agitated, slowed, aggressive, withdrawn, hyperactive or combative. Behavior is cooperative.         Thought Content: Thought content normal.         Judgment: Judgment normal.       Health Maintenance   Topic Date Due    Foot Exam  Never done    RSV Vaccine (Age 60+ and Pregnant patients)  (1 - Risk 60-74 years 1-dose series) Never done    Colorectal Cancer Screening  08/22/2019    DEXA Scan  07/30/2022    Diabetes Urine Screening  05/19/2023    Lipid Panel  05/19/2023    Hemoglobin A1c  09/10/2023    Eye Exam  12/28/2023    Influenza Vaccine (1) 09/01/2024    COVID-19 Vaccine (1 - 2024-25 season) Never done    Mammogram  10/17/2025 (Originally 7/30/2020)    Low Dose Statin  07/02/2025    TETANUS VACCINE  05/27/2028    Hepatitis C Screening  Completed    Shingles Vaccine  Completed    Pneumococcal Vaccines (Age 65+)  Completed     This note was generated with the assistance of ambient listening technology. Verbal consent was obtained by the patient and accompanying visitor(s) for the recording of patient appointment to facilitate this note. I attest to having reviewed and edited the generated note for accuracy, though some syntax or spelling errors may persist. Please contact the author of this note for any clarification.    Visit today included increased complexity associated with the care of the episodic problem - see above- addressed and managing the longitudinal care of the patient due to the serious and/or complex managed problem(s) - see above.

## 2024-12-05 NOTE — ASSESSMENT & PLAN NOTE
She does not have her portable oxygen tank with her. FiO2 90% today. Wheezing on exam. Start prednisone and doxy for lower respiratory s/s. Continue inhalers. Close follow up with pulmonology. Stressed smoking cessation. ER precautions.

## 2024-12-05 NOTE — ASSESSMENT & PLAN NOTE
Update labs. Continue synthroid. Please be advised of hypothyroidism disease course.  We will plan to monitor thyroid labs at routine intervals.  Please be advised of risks and benefits of medication use, see medication insert for complete details.  ER precautions

## 2024-12-05 NOTE — ASSESSMENT & PLAN NOTE
Continue inhalers. Monitor oxygen levels. Supplemental oxygen for levels < 90%. Closely follow up with pulm. ER precautions. Start prednisone and doxy for acute lower respiratory s/s.

## 2024-12-05 NOTE — ASSESSMENT & PLAN NOTE
Update A1C. MARIO ALBERTO Dr. Arroyo in [place] for eye exam; reports visit within the last year. She declined foot exam today. Referral to podiatry placed.    Plan to monitor hemoglobin A1C at designated intervals 3-6 months.  I recommend ongoing Education for diabetic diet and exercise protocol. Follow up with Ophthalmology/Optometry and Podiatry at least annually.

## 2024-12-06 ENCOUNTER — PATIENT MESSAGE (OUTPATIENT)
Dept: HEMATOLOGY/ONCOLOGY | Facility: CLINIC | Age: 74
End: 2024-12-06
Payer: MEDICARE

## 2024-12-06 DIAGNOSIS — R79.89 ABNORMAL CBC: ICD-10-CM

## 2024-12-06 DIAGNOSIS — E87.5 SERUM POTASSIUM ELEVATED: Primary | ICD-10-CM

## 2024-12-06 DIAGNOSIS — D75.1 SECONDARY POLYCYTHEMIA: ICD-10-CM

## 2024-12-09 DIAGNOSIS — E78.5 HYPERLIPIDEMIA, UNSPECIFIED HYPERLIPIDEMIA TYPE: ICD-10-CM

## 2024-12-09 DIAGNOSIS — Z79.899 ENCOUNTER FOR LONG-TERM (CURRENT) USE OF MEDICATIONS: ICD-10-CM

## 2024-12-09 RX ORDER — ATORVASTATIN CALCIUM 40 MG/1
40 TABLET, FILM COATED ORAL NIGHTLY
Qty: 90 TABLET | Refills: 3 | Status: SHIPPED | OUTPATIENT
Start: 2024-12-09

## 2024-12-10 ENCOUNTER — PATIENT OUTREACH (OUTPATIENT)
Dept: ADMINISTRATIVE | Facility: HOSPITAL | Age: 74
End: 2024-12-10
Payer: MEDICARE

## 2024-12-11 ENCOUNTER — TELEPHONE (OUTPATIENT)
Dept: HEMATOLOGY/ONCOLOGY | Facility: CLINIC | Age: 74
End: 2024-12-11
Payer: MEDICARE

## 2024-12-11 DIAGNOSIS — D75.1 SECONDARY POLYCYTHEMIA: Primary | ICD-10-CM

## 2024-12-11 DIAGNOSIS — D45 POLYCYTHEMIA VERA: ICD-10-CM

## 2025-01-02 ENCOUNTER — LAB VISIT (OUTPATIENT)
Dept: LAB | Facility: HOSPITAL | Age: 75
End: 2025-01-02
Attending: NURSE PRACTITIONER
Payer: MEDICARE

## 2025-01-02 ENCOUNTER — PATIENT OUTREACH (OUTPATIENT)
Dept: ADMINISTRATIVE | Facility: HOSPITAL | Age: 75
End: 2025-01-02
Payer: MEDICARE

## 2025-01-02 DIAGNOSIS — D75.1 SECONDARY POLYCYTHEMIA: ICD-10-CM

## 2025-01-02 DIAGNOSIS — D45 POLYCYTHEMIA VERA: ICD-10-CM

## 2025-01-02 LAB
ALBUMIN SERPL BCP-MCNC: 3.8 G/DL (ref 3.5–5.2)
ALP SERPL-CCNC: 83 U/L (ref 40–150)
ALT SERPL W/O P-5'-P-CCNC: 11 U/L (ref 10–44)
ANION GAP SERPL CALC-SCNC: 9 MMOL/L (ref 8–16)
AST SERPL-CCNC: 16 U/L (ref 10–40)
BASOPHILS # BLD AUTO: 0.05 K/UL (ref 0–0.2)
BASOPHILS NFR BLD: 0.6 % (ref 0–1.9)
BILIRUB SERPL-MCNC: 0.6 MG/DL (ref 0.1–1)
BUN SERPL-MCNC: 17 MG/DL (ref 8–23)
CALCIUM SERPL-MCNC: 9.6 MG/DL (ref 8.7–10.5)
CHLORIDE SERPL-SCNC: 102 MMOL/L (ref 95–110)
CO2 SERPL-SCNC: 28 MMOL/L (ref 23–29)
CREAT SERPL-MCNC: 0.7 MG/DL (ref 0.5–1.4)
DIFFERENTIAL METHOD BLD: ABNORMAL
EOSINOPHIL # BLD AUTO: 0.1 K/UL (ref 0–0.5)
EOSINOPHIL NFR BLD: 1.1 % (ref 0–8)
ERYTHROCYTE [DISTWIDTH] IN BLOOD BY AUTOMATED COUNT: 13.8 % (ref 11.5–14.5)
EST. GFR  (NO RACE VARIABLE): >60 ML/MIN/1.73 M^2
GLUCOSE SERPL-MCNC: 85 MG/DL (ref 70–110)
HCT VFR BLD AUTO: 53.4 % (ref 37–48.5)
HGB BLD-MCNC: 16.8 G/DL (ref 12–16)
IMM GRANULOCYTES # BLD AUTO: 0.02 K/UL (ref 0–0.04)
IMM GRANULOCYTES NFR BLD AUTO: 0.2 % (ref 0–0.5)
LYMPHOCYTES # BLD AUTO: 1.6 K/UL (ref 1–4.8)
LYMPHOCYTES NFR BLD: 18.4 % (ref 18–48)
MCH RBC QN AUTO: 31.6 PG (ref 27–31)
MCHC RBC AUTO-ENTMCNC: 31.5 G/DL (ref 32–36)
MCV RBC AUTO: 101 FL (ref 82–98)
MONOCYTES # BLD AUTO: 0.6 K/UL (ref 0.3–1)
MONOCYTES NFR BLD: 6.9 % (ref 4–15)
NEUTROPHILS # BLD AUTO: 6.4 K/UL (ref 1.8–7.7)
NEUTROPHILS NFR BLD: 72.8 % (ref 38–73)
NRBC BLD-RTO: 0 /100 WBC
PLATELET # BLD AUTO: 189 K/UL (ref 150–450)
PMV BLD AUTO: 10.4 FL (ref 9.2–12.9)
POTASSIUM SERPL-SCNC: 4.8 MMOL/L (ref 3.5–5.1)
PROT SERPL-MCNC: 7.6 G/DL (ref 6–8.4)
RBC # BLD AUTO: 5.31 M/UL (ref 4–5.4)
SODIUM SERPL-SCNC: 139 MMOL/L (ref 136–145)
WBC # BLD AUTO: 8.82 K/UL (ref 3.9–12.7)

## 2025-01-02 PROCEDURE — 81270 JAK2 GENE: CPT | Performed by: NURSE PRACTITIONER

## 2025-01-02 PROCEDURE — 82668 ASSAY OF ERYTHROPOIETIN: CPT | Performed by: NURSE PRACTITIONER

## 2025-01-02 PROCEDURE — 85025 COMPLETE CBC W/AUTO DIFF WBC: CPT | Performed by: NURSE PRACTITIONER

## 2025-01-02 PROCEDURE — 81339 MPL GENE SEQ ALYS EXON 10: CPT | Performed by: NURSE PRACTITIONER

## 2025-01-02 PROCEDURE — 36415 COLL VENOUS BLD VENIPUNCTURE: CPT | Mod: PO | Performed by: NURSE PRACTITIONER

## 2025-01-02 PROCEDURE — 81219 CALR GENE COM VARIANTS: CPT | Performed by: NURSE PRACTITIONER

## 2025-01-02 PROCEDURE — 80053 COMPREHEN METABOLIC PANEL: CPT | Performed by: NURSE PRACTITIONER

## 2025-01-03 ENCOUNTER — PATIENT OUTREACH (OUTPATIENT)
Dept: ADMINISTRATIVE | Facility: HOSPITAL | Age: 75
End: 2025-01-03
Payer: MEDICARE

## 2025-01-06 LAB — EPO SERPL-ACNC: 7.4 MIU/ML (ref 2.6–18.5)

## 2025-01-08 ENCOUNTER — OFFICE VISIT (OUTPATIENT)
Dept: HEMATOLOGY/ONCOLOGY | Facility: CLINIC | Age: 75
End: 2025-01-08
Payer: MEDICARE

## 2025-01-08 VITALS
DIASTOLIC BLOOD PRESSURE: 72 MMHG | HEIGHT: 64 IN | BODY MASS INDEX: 22.9 KG/M2 | OXYGEN SATURATION: 83 % | SYSTOLIC BLOOD PRESSURE: 123 MMHG | HEART RATE: 89 BPM | WEIGHT: 134.13 LBS

## 2025-01-08 DIAGNOSIS — F17.210 CIGARETTE SMOKER: ICD-10-CM

## 2025-01-08 DIAGNOSIS — E53.8 B12 DEFICIENCY: ICD-10-CM

## 2025-01-08 DIAGNOSIS — D75.1 POLYCYTHEMIA SECONDARY TO HYPOXIA: Primary | ICD-10-CM

## 2025-01-08 PROCEDURE — 1159F MED LIST DOCD IN RCRD: CPT | Mod: CPTII,S$GLB,, | Performed by: NURSE PRACTITIONER

## 2025-01-08 PROCEDURE — 3074F SYST BP LT 130 MM HG: CPT | Mod: CPTII,S$GLB,, | Performed by: NURSE PRACTITIONER

## 2025-01-08 PROCEDURE — 3288F FALL RISK ASSESSMENT DOCD: CPT | Mod: CPTII,S$GLB,, | Performed by: NURSE PRACTITIONER

## 2025-01-08 PROCEDURE — 3078F DIAST BP <80 MM HG: CPT | Mod: CPTII,S$GLB,, | Performed by: NURSE PRACTITIONER

## 2025-01-08 PROCEDURE — 3008F BODY MASS INDEX DOCD: CPT | Mod: CPTII,S$GLB,, | Performed by: NURSE PRACTITIONER

## 2025-01-08 PROCEDURE — 1101F PT FALLS ASSESS-DOCD LE1/YR: CPT | Mod: CPTII,S$GLB,, | Performed by: NURSE PRACTITIONER

## 2025-01-08 PROCEDURE — 1126F AMNT PAIN NOTED NONE PRSNT: CPT | Mod: CPTII,S$GLB,, | Performed by: NURSE PRACTITIONER

## 2025-01-08 PROCEDURE — 3060F POS MICROALBUMINURIA REV: CPT | Mod: CPTII,S$GLB,, | Performed by: NURSE PRACTITIONER

## 2025-01-08 PROCEDURE — 3066F NEPHROPATHY DOC TX: CPT | Mod: CPTII,S$GLB,, | Performed by: NURSE PRACTITIONER

## 2025-01-08 PROCEDURE — 99215 OFFICE O/P EST HI 40 MIN: CPT | Mod: S$GLB,,, | Performed by: NURSE PRACTITIONER

## 2025-01-08 PROCEDURE — G2211 COMPLEX E/M VISIT ADD ON: HCPCS | Mod: S$GLB,,, | Performed by: NURSE PRACTITIONER

## 2025-01-08 PROCEDURE — 1160F RVW MEDS BY RX/DR IN RCRD: CPT | Mod: CPTII,S$GLB,, | Performed by: NURSE PRACTITIONER

## 2025-01-08 PROCEDURE — 99999 PR PBB SHADOW E&M-EST. PATIENT-LVL III: CPT | Mod: PBBFAC,,, | Performed by: NURSE PRACTITIONER

## 2025-01-08 RX ORDER — LIDOCAINE HYDROCHLORIDE 10 MG/ML
1 INJECTION, SOLUTION EPIDURAL; INFILTRATION; INTRACAUDAL; PERINEURAL ONCE
OUTPATIENT
Start: 2025-01-08 | End: 2025-01-08

## 2025-01-08 NOTE — PROGRESS NOTES
Subjective:      Patient ID: Kami Higgins is a 74 y.o. female.    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  5/19/2022 with hct 57.7 and found with elevated D-dimer.  CTA done 5/23/2022 - no evidence of PE.  States that she recently received a portable oxygen tank that is able to transport.  She is currently only using her oxygen at night.  States that she does continue to smoke, but not as much (about less then 10 per day vs. 2 packs per day)     She ran out of her oxygen 5/2022 and was unable to get new order due to change in insurance and pulmonologist retiring.        She also has been found with a 4 mm left upper lobe on CT that needs that requires monitoring every 6 months followed by pulmonology.  She is schedule as a new patient visit with Rachana Cronin NP on 8/5/2022 7/30/2019  Mammogram  Left  There is a 9 mm oval mass with circumscribed margins seen in the retroareolar region of the left breast.   Right  There is no evidence of suspicious masses, calcifications, or other abnormal findings.   Impression:  Left  Mass: Left breast 9 mm mass at the retroareolar position. Assessment: 0 - Incomplete. Ultrasound is recommended.   Right  There is no mammographic evidence of malignancy.  BI-RADS Category:   Overall: 0 - Incomplete: Needs Additional Imaging Evaluation   Recommendation:  Breast ultrasound is recommended.  8/5/2019 US left breast   Impression:  Left  Mass: Left breast 5 mm x 5 mm x 4 mm mass. Assessment: 3 - Probably benign. Short Interval Follow-Up in 6 Months is recommended.   BI-RADS Category:   Left: 3 - Probably Benign  Overall: 3 - Probably Benign  Recommendation:  Short interval follow-up is recommended in 6 Months     8/2019 Positive Cologuard testing     Interval History  9/30/2022 Presents today to  see if phlebotomy is needed for secondary polycythemia due to smoking.  States that she did not do a mammogram because she does not need to.  Also states that she does not want to do colonoscopy. She alos told me that if she was found with cancer she would not want to be treated.  She states that she continues to smoke with no desire to quit.      Interval History  3/22/2023 Presents today in clinic for evaluation of recent labs with secondary polycythemia.  Has canceled past 2 visit and has been lost to f/u.  Currently with hct 57.8.  Has no complaints today.  States that she is working on reducing cigarette smoking. Currently O2 is 87% on RA.  Denies sob or chest pain.  States that she does not have a portable O2 tank.  Wears O2 at home at 3 liters.      Interval history:  1/8/2025 Presents today to evaluate labs with secondary polycythemia O2 82 % currently on RA.  States that she has her portable oxygen in the car.  She is using oxygen continuously while sleeping but not throughout the day.  Continues to smoke cigarettes - over a pack/day.  Currently with hct 53.4.  mcv 101.  With h/o b12 deficiency.  States that she is no longer taking her B12 supplement.     Social History     Socioeconomic History    Marital status:    Tobacco Use    Smoking status: Some Days     Current packs/day: 0.50     Types: Cigarettes    Smokeless tobacco: Never   Substance and Sexual Activity    Alcohol use: Never    Drug use: Never    Sexual activity: Not Currently     Partners: Male     Social Drivers of Health     Financial Resource Strain: Low Risk  (4/8/2024)    Overall Financial Resource Strain (CARDIA)     Difficulty of Paying Living Expenses: Not very hard   Recent Concern: Financial Resource Strain - Medium Risk (3/20/2024)    Overall Financial Resource Strain (CARDIA)     Difficulty of Paying Living Expenses: Somewhat hard   Food Insecurity: No Food Insecurity (4/8/2024)    Hunger Vital Sign     Worried About Running  Out of Food in the Last Year: Never true     Ran Out of Food in the Last Year: Never true   Recent Concern: Food Insecurity - Food Insecurity Present (3/20/2024)    Hunger Vital Sign     Worried About Running Out of Food in the Last Year: Sometimes true     Ran Out of Food in the Last Year: Sometimes true   Transportation Needs: No Transportation Needs (4/8/2024)    PRAPARE - Transportation     Lack of Transportation (Medical): No     Lack of Transportation (Non-Medical): No   Recent Concern: Transportation Needs - Unmet Transportation Needs (3/20/2024)    PRAPARE - Transportation     Lack of Transportation (Medical): Yes     Lack of Transportation (Non-Medical): Yes   Physical Activity: Insufficiently Active (7/30/2019)    Exercise Vital Sign     Days of Exercise per Week: 3 days     Minutes of Exercise per Session: 40 min   Stress: Stress Concern Present (7/30/2019)    [employer] - Occupational Stress Questionnaire     Feeling of Stress : To some extent       Family History   Problem Relation Name Age of Onset    Colon cancer Neg Hx      Stomach cancer Neg Hx      Esophageal cancer Neg Hx         Past Surgical History:   Procedure Laterality Date    ABDOMINAL AORTIC ANEURYSM REPAIR      cardiac catheterization  2017    HYSTERECTOMY      THYROIDECTOMY      TUBAL LIGATION         Past Medical History:   Diagnosis Date    AAA (abdominal aortic aneurysm) without rupture     COPD (chronic obstructive pulmonary disease)     Hyperlipidemia     Hypertension     Pulmonary nodule     Thyroid disease        Review of Systems   Constitutional: Negative.    HENT: Negative.     Respiratory:  Negative for chest tightness and shortness of breath.    Cardiovascular:  Negative for chest pain and palpitations.   Gastrointestinal: Negative.    Endocrine: Negative.    Genitourinary: Negative.    Musculoskeletal: Negative.    Skin: Negative.    Allergic/Immunologic: Negative.    Neurological: Negative.     Hematological: Negative.    Psychiatric/Behavioral:  Positive for confusion (at times).         Increased forgetfulness          Medication List with Changes/Refills   Current Medications    ALBUTEROL (ACCUNEB) 1.25 MG/3 ML NEBU    Take 3 mLs (1.25 mg total) by nebulization 2 (two) times a day. Rescue    ALBUTEROL (PROVENTIL/VENTOLIN HFA) 90 MCG/ACTUATION INHALER    INHALE 1 OR 2 PUFFS BY MOUTH EVERY 4 TO 6 HOURS AS NEEDED    AMLODIPINE-BENAZEPRIL 10-20MG (LOTREL) 10-20 MG PER CAPSULE    TAKE 1 CAPSULE BY MOUTH EVERY DAY    ATORVASTATIN (LIPITOR) 40 MG TABLET    Take 1 tablet (40 mg total) by mouth every evening.    B COMPLEX VITAMINS CAPSULE    Take 1 capsule by mouth once daily.    CALCIUM CARBONATE (OS-FREDY) 500 MG CALCIUM (1,250 MG) TABLET    Take 1 tablet (500 mg total) by mouth once daily.    DOXYCYCLINE (VIBRAMYCIN) 100 MG CAP    Take 1 capsule (100 mg total) by mouth every 12 (twelve) hours.    KETOROLAC 0.5% (ACULAR) 0.5 % DROP    Place into both eyes.    LEVOTHYROXINE (SYNTHROID) 50 MCG TABLET    TAKE 1 TABLET(50 MCG) BY MOUTH EVERY MORNING    METFORMIN (GLUCOPHAGE) 1000 MG TABLET    TAKE 1 TABLET(1000 MG) BY MOUTH TWICE DAILY WITH MEALS    MULTIVITAMIN CAPSULE    Take 1 capsule by mouth once daily.    OMEPRAZOLE (PRILOSEC) 20 MG CAPSULE    Take 1 capsule (20 mg total) by mouth once daily.    ONDANSETRON (ZOFRAN-ODT) 4 MG TBDL    Take by mouth.    PREDNISOLONE ACETATE (PRED FORTE) 1 % DRPS    Place into both eyes.    PREDNISONE (DELTASONE) 10 MG TABLET    Take 1 tablet (10 mg total) by mouth 2 (two) times daily.    UMECLIDINIUM-VILANTEROL (ANORO ELLIPTA) 62.5-25 MCG/ACTUATION DSDV    INHALE 1 PUFF BY MOUTH ONCE DAILY AS DIRECTED        Objective:     Vitals:    01/08/25 1137   BP: 123/72   Pulse: 89         Physical Exam  Vitals reviewed.   Constitutional:       Appearance: Normal appearance.   HENT:      Head: Normocephalic and atraumatic.      Right Ear: External ear normal.      Left Ear: External ear  normal.   Cardiovascular:      Rate and Rhythm: Normal rate and regular rhythm.      Heart sounds: Normal heart sounds, S1 normal and S2 normal.   Pulmonary:      Effort: Pulmonary effort is normal.      Breath sounds: Decreased air movement present.   Abdominal:      General: There is no distension.   Musculoskeletal:         General: Normal range of motion.      Cervical back: Normal range of motion.   Skin:     General: Skin is warm and dry.   Neurological:      General: No focal deficit present.      Mental Status: She is alert and oriented to person, place, and time.   Psychiatric:         Attention and Perception: Attention and perception normal.         Mood and Affect: Mood and affect normal.         Speech: Speech normal.         Behavior: Behavior normal. Behavior is cooperative.         Thought Content: Thought content normal.         Cognition and Memory: Cognition and memory normal.         Judgment: Judgment normal.         Assessment:     Problem List Items Addressed This Visit       Cigarette smoker    Relevant Orders    CBC Auto Differential    B12 deficiency    Relevant Orders    CBC Auto Differential    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 - Primary    Relevant Orders    CBC Auto Differential         Lab Results   Component Value Date    WBC 8.82 01/02/2025    RBC 5.31 01/02/2025    HGB 16.8 (H) 01/02/2025    HCT 53.4 (H) 01/02/2025     (H) 01/02/2025    MCH 31.6 (H) 01/02/2025    MCHC 31.5 (L) 01/02/2025    RDW 13.8 01/02/2025     01/02/2025    MPV 10.4 01/02/2025    GRAN 6.4 01/02/2025    GRAN 72.8 01/02/2025    LYMPH 1.6 01/02/2025    LYMPH 18.4 01/02/2025    MONO 0.6 01/02/2025    MONO 6.9 01/02/2025    EOS 0.1 01/02/2025    BASO 0.05 01/02/2025    EOSINOPHIL 1.1 01/02/2025    BASOPHIL 0.6 01/02/2025      Lab Results   Component Value Date     01/02/2025    K 4.8 01/02/2025     01/02/2025    CO2 28 01/02/2025    BUN 17 01/02/2025    CREATININE 0.7 01/02/2025    CALCIUM  9.6 01/02/2025    ANIONGAP 9 01/02/2025    ESTGFRAFRICA >60.0 06/22/2022    EGFRNONAA >60.0 06/22/2022     Lab Results   Component Value Date    ALT 11 01/02/2025    AST 16 01/02/2025    ALKPHOS 83 01/02/2025    BILITOT 0.6 01/02/2025     Lab Results   Component Value Date    WBC 8.82 01/02/2025    HGB 16.8 (H) 01/02/2025    HCT 53.4 (H) 01/02/2025     (H) 01/02/2025     01/02/2025     Lab Results   Component Value Date    JTCXOWNO68 870 11/02/2021     Lab Results   Component Value Date    FOLATE 11.5 08/20/2019     Lab Results   Component Value Date    TSH 0.682 12/05/2024         Plan:   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  -     CBC Auto Differential; Standing    B12 deficiency  -     CBC Auto Differential; Standing    Cigarette smoker  -     CBC Auto Differential; Standing    Other orders  -     LIDOcaine (PF) 10 mg/ml (1%) injection 10 mg         Patient is with secondary polycythemia due to hypoxia.  Encouraged to wear oxygen continuously. She states that she when she sleeps; however does not use continuously.   Advised not to exceed 5 liters O2.  Patient verbalized understanding.  She will f/u with pulmonology.  Recommended smoking cessation and offered initiation of smoking cessation program.  Patient states that she will continue working on this.         Repeat CBC today - Hct < 55% .  No need for phlebotomy currently.       Cbc every 3 months with possible phlebotomy.  F/u in 6 months with cbc - possible phlebotomy for hct >55%    She will restart her B12 supplement twice weekly    Goal: States that she is going to cut back to 1/2 cigarettes pack/day for 2 weeks then quit cold turkey.        Med Onc Chart Routing      Follow up with physician    Follow up with MALIHA 6 months. standing cbc's every 3 months  - in person visit in 6 months   Infusion scheduling note   possible phlebotomy after each lab   Injection scheduling note n/a   Labs   [facility]  [facility]  Lab  interval:  cbc every 3 months   Imaging   N/a   Pharmacy appointment No pharmacy appointment needed      Other referrals       No additional referrals needed  n/a         Total time spent on encounter: 45 minutes.     Collaborating Provider:  Dr. Laron Crawford    Thank You,  RONNIE SalazarP-C  Hematology Oncology

## 2025-01-09 ENCOUNTER — TELEPHONE (OUTPATIENT)
Dept: PULMONOLOGY | Facility: CLINIC | Age: 75
End: 2025-01-09
Payer: MEDICARE

## 2025-01-09 LAB
MPNR  FINAL DIAGNOSIS: NORMAL
MPNR  SPECIMEN TYPE: NORMAL
MPNR RESULT: NORMAL

## 2025-01-09 NOTE — TELEPHONE ENCOUNTER
----- Message from Shawna sent at 1/9/2025  3:10 PM CST -----  .Type: Patient Call Back        Who called:   PATIENT      What is the request in detail:    CALLED IN NEEDING TO GET A NEW ORDER FOR OXYGEN SENT TO [facility]. PATIENT WAS ADVISED THAT SHE  NEEDS THE OXYGEN 24/7 . PATIENT IS ALSO NEEDING AN APPT . PLEASE CALL BACK   Can the clinic reply by [facility]?           Would the patient rather a call back or a response via [facility]?   CALL      Best call back number:  [phone]

## 2025-01-13 PROBLEM — R94.2 ABNORMAL PFTS (PULMONARY FUNCTION TESTS): Status: ACTIVE | Noted: 2025-01-13

## 2025-01-15 ENCOUNTER — CLINICAL SUPPORT (OUTPATIENT)
Dept: PULMONOLOGY | Facility: CLINIC | Age: 75
End: 2025-01-15
Payer: MEDICARE

## 2025-01-15 ENCOUNTER — OFFICE VISIT (OUTPATIENT)
Dept: PULMONOLOGY | Facility: CLINIC | Age: 75
End: 2025-01-15
Payer: MEDICARE

## 2025-01-15 VITALS
HEIGHT: 64 IN | HEIGHT: 64 IN | OXYGEN SATURATION: 96 % | WEIGHT: 132 LBS | BODY MASS INDEX: 22.53 KG/M2 | HEART RATE: 73 BPM | RESPIRATION RATE: 15 BRPM | SYSTOLIC BLOOD PRESSURE: 129 MMHG | WEIGHT: 132.69 LBS | BODY MASS INDEX: 22.65 KG/M2 | DIASTOLIC BLOOD PRESSURE: 61 MMHG

## 2025-01-15 DIAGNOSIS — J44.9 STAGE 3 SEVERE COPD BY GOLD CLASSIFICATION: ICD-10-CM

## 2025-01-15 DIAGNOSIS — R94.2 ABNORMAL PFTS (PULMONARY FUNCTION TESTS): ICD-10-CM

## 2025-01-15 DIAGNOSIS — D75.1 SECONDARY POLYCYTHEMIA: ICD-10-CM

## 2025-01-15 DIAGNOSIS — J44.9 COPD (CHRONIC OBSTRUCTIVE PULMONARY DISEASE): Primary | ICD-10-CM

## 2025-01-15 DIAGNOSIS — F17.210 CIGARETTE SMOKER: ICD-10-CM

## 2025-01-15 DIAGNOSIS — R91.1 PULMONARY NODULE: ICD-10-CM

## 2025-01-15 DIAGNOSIS — J44.9 STAGE 3 SEVERE COPD BY GOLD CLASSIFICATION: Primary | ICD-10-CM

## 2025-01-15 PROCEDURE — 1159F MED LIST DOCD IN RCRD: CPT | Mod: CPTII,S$GLB,,

## 2025-01-15 PROCEDURE — 3288F FALL RISK ASSESSMENT DOCD: CPT | Mod: CPTII,S$GLB,,

## 2025-01-15 PROCEDURE — 1126F AMNT PAIN NOTED NONE PRSNT: CPT | Mod: CPTII,S$GLB,,

## 2025-01-15 PROCEDURE — 1160F RVW MEDS BY RX/DR IN RCRD: CPT | Mod: CPTII,S$GLB,,

## 2025-01-15 PROCEDURE — 3060F POS MICROALBUMINURIA REV: CPT | Mod: CPTII,S$GLB,,

## 2025-01-15 PROCEDURE — 3008F BODY MASS INDEX DOCD: CPT | Mod: CPTII,S$GLB,,

## 2025-01-15 PROCEDURE — 3066F NEPHROPATHY DOC TX: CPT | Mod: CPTII,S$GLB,,

## 2025-01-15 PROCEDURE — 99214 OFFICE O/P EST MOD 30 MIN: CPT | Mod: 25,S$GLB,,

## 2025-01-15 PROCEDURE — 3078F DIAST BP <80 MM HG: CPT | Mod: CPTII,S$GLB,,

## 2025-01-15 PROCEDURE — 94618 PULMONARY STRESS TESTING: CPT | Mod: S$GLB,,, | Performed by: INTERNAL MEDICINE

## 2025-01-15 PROCEDURE — 99999 PR PBB SHADOW E&M-EST. PATIENT-LVL V: CPT | Mod: PBBFAC,,,

## 2025-01-15 PROCEDURE — 3074F SYST BP LT 130 MM HG: CPT | Mod: CPTII,S$GLB,,

## 2025-01-15 PROCEDURE — 1101F PT FALLS ASSESS-DOCD LE1/YR: CPT | Mod: CPTII,S$GLB,,

## 2025-01-15 RX ORDER — UMECLIDINIUM BROMIDE AND VILANTEROL TRIFENATATE 62.5; 25 UG/1; UG/1
POWDER RESPIRATORY (INHALATION)
Qty: 180 EACH | Refills: 1 | Status: SHIPPED | OUTPATIENT
Start: 2025-01-15

## 2025-01-15 NOTE — ASSESSMENT & PLAN NOTE
Current everyday smoker  Not ready to quit  Declined smoking cessation  Advised smoking cessation  Discussed oxygen therapy and smoking risk
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
## 2025-01-15 NOTE — PROCEDURES
"[facility] - Pulmonary Function  Six Minute Walk     SUMMARY     Ordering Provider: Dr. Sánchez   Interpreting Provider: Dr. Sánchez  Performing nurse/tech/RT: TESFAYE Burnette RRT  Diagnosis: COPD  Height: 5' 4" (162.6 cm)  Weight: 60.2 kg (132 lb 11.5 oz)  BMI (Calculated): 22.8                Phase Oxygen Assessment Supplemental O2 Heart   Rate Blood Pressure Jose Dyspnea Scale Rating   Resting 71 % Room Air 82 bpm 143/72 3   Exercise        Minute        1 89 % 3 L/M 83 bpm     2 85 % 3 L/M 84 bpm     3 86 % 4 L/M 84 bpm     4 91 % 6 L/M 82 bpm     5 93 % 6 L/M 87 bpm     6  89 % 6 L/M 87 bpm 127/65 3   Recovery        Minute        1 91 % 6 L/M 86 bpm     2 93 % 6 L/M 89 bpm     3 91 % 6 L/M 77 bpm     4 96 % 6 L/M 73 bpm 129/61 3     Six Minute Walk Summary  6MWT Status: completed without stopping  Patient Reported: No complaints     Interpretation:  Did the patient stop or pause?: No   Total Time Walked (Calculated): 360 seconds  Final Partial Lap Distance (feet): 100 feet  Total Distance Meters (Calculated): 152.4 meters  Predicted Distance Meters (Calculated): 456.07 meters  Percentage of Predicted (Calculated): 33.42  Peak VO2 (Calculated): 8.55  Mets: 2.44  Has The Patient Had a Previous Six Minute Walk Test?: Yes       Previous 6MWT Results  Has The Patient Had a Previous Six Minute Walk Test?: Yes  Date of Previous Test: 04/03/23  Total Time Walked: 360 seconds  Total Distance (meters): 259.08  Predicted Distance (meters): 448.39 meters  Percentage of Predicted: 57.78  Percent Change (Calculated): 0.41           CLINICAL INTERPRETATION:  Six minute walk distance is 152.4m (33.42 % predicted) with light dyspnea.  During exercise, there was significant desaturation while breathing supplemental oxygen.  Oxygen was added 3.0  to 6.0 LPM  Blood pressure remained stable and Heart rate remained stable with walking.  The patient did not report non-pulmonary symptoms during exercise.  The patient did complete the " study, walking 360 seconds of the 360 second test.  The patient may benefit from using supplemental oxygen and using supplemental oxygen during exertion.  Since the previous study in 04/03/2023, exercise capacity is significantly worse.  Based upon age and body mass index, exercise capacity is less than predicted.      [] Mild exercise-induced hypoxemia described as an arterial oxygen saturation of 93-95% (or 3-4% less than at rest)  []  Moderate exercise-induced hypoxemia as 89-93%  [x]  Severe exercise induced hypoxemia as < 89% O2 saturation.  Medicare Criteria for oxygen prescription comments:   [x]  When arterial oxygen saturation is at or below 88% during exercise (severe exercise induced hypoxemia) then the patient falls under Medicare Group 1 criteria for supplemental oxygen

## 2025-01-17 ENCOUNTER — TELEPHONE (OUTPATIENT)
Dept: PULMONOLOGY | Facility: CLINIC | Age: 75
End: 2025-01-17
Payer: MEDICARE

## 2025-01-17 NOTE — TELEPHONE ENCOUNTER
----- Message from Daiana sent at 1/17/2025  2:39 PM CST -----  Contact: self  [phone]  Type:  Needs Medical Advice    Who Called: Kami  Symptoms (please be specific): oxygen machine  How long has patient had these symptoms:   Pharmacy name and phone #:   Would the patient rather a call back or a response via [facility]?call back   Best Call Back Number: [phone]  Additional Information: patient called in this afternoon stating she does not like the new oxygen machine she got on yesterday she says she prefers the one she had before. Please call back  [phone]

## 2025-01-17 NOTE — TELEPHONE ENCOUNTER
Spoke to pt.  She had concerns about her new concentrator.  I advised her to call Kathi for assistance with her particular questions.

## 2025-01-26 DIAGNOSIS — I10 HYPERTENSION, ESSENTIAL: ICD-10-CM

## 2025-01-26 DIAGNOSIS — Z79.899 ENCOUNTER FOR LONG-TERM (CURRENT) USE OF MEDICATIONS: ICD-10-CM

## 2025-01-27 RX ORDER — AMLODIPINE AND BENAZEPRIL HYDROCHLORIDE 10; 20 MG/1; MG/1
1 CAPSULE ORAL
Qty: 90 CAPSULE | Refills: 0 | Status: SHIPPED | OUTPATIENT
Start: 2025-01-27

## 2025-01-27 NOTE — TELEPHONE ENCOUNTER
Refill Routing Note   Medication(s) are not appropriate for processing by [facility] for the following reason(s):        Patient not seen by provider within 15 months    [facility] action(s):  Defer               Appointments  past 12m or future 3m with PCP    Date Provider   Last Visit   9/12/2022 Jose Steinberg MD   Next Visit   Visit date not found Jose Steinberg MD   ED visits in past 90 days: 0        Note composed:10:01 AM 01/27/2025

## 2025-01-28 ENCOUNTER — PATIENT MESSAGE (OUTPATIENT)
Dept: ADMINISTRATIVE | Facility: HOSPITAL | Age: 75
End: 2025-01-28
Payer: MEDICARE

## 2025-01-28 DIAGNOSIS — I10 HYPERTENSION, ESSENTIAL: ICD-10-CM

## 2025-01-28 DIAGNOSIS — Z79.899 ENCOUNTER FOR LONG-TERM (CURRENT) USE OF MEDICATIONS: ICD-10-CM

## 2025-01-28 RX ORDER — AMLODIPINE AND BENAZEPRIL HYDROCHLORIDE 10; 20 MG/1; MG/1
1 CAPSULE ORAL
Qty: 90 CAPSULE | Refills: 0 | OUTPATIENT
Start: 2025-01-28

## 2025-01-28 NOTE — TELEPHONE ENCOUNTER
No care due was identified.  [facility] Embedded Care Due Messages. Reference number: [id number].   1/28/2025 5:02:08 PM CST

## 2025-01-28 NOTE — TELEPHONE ENCOUNTER
----- Message from Mariya sent at 1/28/2025  4:50 PM CST -----  Contact: Kami  Type:  RX Refill Request    Who Called: Kami  Refill or New Rx: Need new Rx for blood pressure medication  RX Name and Strength:amlodipine-benazepril 10-20mg (LOTREL) 10-20 mg per capsule  How is the patient currently taking it? (ex. 1XDay): 1x day  Is this a 30 day or 90 day RX: 90 day  Preferred Pharmacy with phone number:   [id number] - [place], [place] - [address] [facility] [address] [address]  [address] [place] [address]  Phone: [phone] Fax: [phone]  Local or Mail Order: Local  Ordering Provider: DARYL Garcia  Would the patient rather a call back or a response via [facility]? Call back  Best Call Back Number: [phone]  Additional Information:

## 2025-01-29 NOTE — TELEPHONE ENCOUNTER
Refill Decision Note   Kami Higgins  is requesting a refill authorization.  Brief Assessment and Rationale for Refill:  Quick Discontinue     Medication Therapy Plan:  Receipt confirmed by pharmacy (1/27/2025 10:11 AM CST)      Comments:     Note composed:8:17 PM 01/28/2025

## 2025-02-14 ENCOUNTER — TELEPHONE (OUTPATIENT)
Dept: PULMONOLOGY | Facility: CLINIC | Age: 75
End: 2025-02-14
Payer: MEDICARE

## 2025-02-21 DIAGNOSIS — Z00.00 ENCOUNTER FOR MEDICARE ANNUAL WELLNESS EXAM: ICD-10-CM

## 2025-03-06 ENCOUNTER — TELEPHONE (OUTPATIENT)
Dept: FAMILY MEDICINE | Facility: CLINIC | Age: 75
End: 2025-03-06
Payer: MEDICARE

## 2025-03-06 NOTE — TELEPHONE ENCOUNTER
----- Message from Edson sent at 3/6/2025 12:19 PM CST -----  Contact: patient  Type:  Needs Medical AdviceWho Called: Kami Higgins Would the patient rather a call back or a response via [facility]? [facility] Call Back Number:  [phone] Additional Information:  pt would like a call back in regards to her oxygen machine.

## 2025-03-06 NOTE — TELEPHONE ENCOUNTER
"Call returned. Pt stated that she needs a new order for a portable oxygen machine. Pt was offered an appt to see the NP, per pt, "I do not want to see any more nurse practitioners, I want to see my PCP." Please further assist pt with an appt.   "

## 2025-03-12 ENCOUNTER — TELEPHONE (OUTPATIENT)
Dept: FAMILY MEDICINE | Facility: CLINIC | Age: 75
End: 2025-03-12
Payer: MEDICARE

## 2025-03-12 NOTE — TELEPHONE ENCOUNTER
"----- Message from Galina sent at 3/12/2025 12:57 PM CDT -----  Contact: Kami  Type:  Sooner Apoointment RequestCaller is requesting a sooner appointment.  Caller declined first available appointment listed below.  Caller will not accept being placed on the waitlist and is requesting a message be sent to doctor.Name of Caller: Shravan is the first available appointment? Friday 6/13/2025Symptoms: Ear check, possible ear infectionWould the patient rather a call back or a response via [facility]? Call back Best Call Back Number: Please call her at  [phone] Information:  When offer to schedule her with a NP she stated "no" only wants to see Dr. Steinberg  "

## 2025-03-14 ENCOUNTER — TELEPHONE (OUTPATIENT)
Dept: PULMONOLOGY | Facility: CLINIC | Age: 75
End: 2025-03-14
Payer: MEDICARE

## 2025-03-14 ENCOUNTER — PATIENT MESSAGE (OUTPATIENT)
Dept: FAMILY MEDICINE | Facility: CLINIC | Age: 75
End: 2025-03-14
Payer: MEDICARE

## 2025-03-14 DIAGNOSIS — J44.9 STAGE 3 SEVERE COPD BY GOLD CLASSIFICATION: Primary | ICD-10-CM

## 2025-03-19 ENCOUNTER — TELEPHONE (OUTPATIENT)
Dept: FAMILY MEDICINE | Facility: CLINIC | Age: 75
End: 2025-03-19
Payer: MEDICARE

## 2025-03-19 NOTE — TELEPHONE ENCOUNTER
----- Message from Nadyasylvester sent at 3/19/2025  4:25 PM CDT -----  Contact: Kathi  Type:  Patient Returning CallWho Called:Cindy Who Left Message for Patient:Clemente Does the patient know what this is regarding?:yes Would the patient rather a call back or a response [facility]? Call Back Best Call Back Number:[phone] Information: Kathi is calling in regards to patient. She is unsure if call   came from the office but she received a voicemail for someone regarding patient oxygen medication.Thanks KB

## 2025-03-19 NOTE — TELEPHONE ENCOUNTER
Attempted to return call to number left and it was to [facility] [place]. Did not speak to anyone due to no answer.

## 2025-03-29 DIAGNOSIS — Z79.899 ENCOUNTER FOR LONG-TERM (CURRENT) USE OF MEDICATIONS: ICD-10-CM

## 2025-03-29 DIAGNOSIS — J44.0 CHRONIC OBSTRUCTIVE PULMONARY DISEASE WITH ACUTE LOWER RESPIRATORY INFECTION: ICD-10-CM

## 2025-03-31 RX ORDER — ALBUTEROL SULFATE 90 UG/1
INHALANT RESPIRATORY (INHALATION)
Qty: 54 G | Refills: 0 | Status: SHIPPED | OUTPATIENT
Start: 2025-03-31

## 2025-04-09 ENCOUNTER — TELEPHONE (OUTPATIENT)
Dept: HEMATOLOGY/ONCOLOGY | Facility: CLINIC | Age: 75
End: 2025-04-09
Payer: MEDICARE

## 2025-04-10 ENCOUNTER — TELEPHONE (OUTPATIENT)
Dept: HEMATOLOGY/ONCOLOGY | Facility: CLINIC | Age: 75
End: 2025-04-10
Payer: MEDICARE

## 2025-04-17 ENCOUNTER — PATIENT OUTREACH (OUTPATIENT)
Dept: ADMINISTRATIVE | Facility: HOSPITAL | Age: 75
End: 2025-04-17
Payer: MEDICARE

## 2025-04-17 NOTE — PROGRESS NOTES
VBC Program: Per chart pt declines colon, recent outreach for [facility] in which duplicate exam from former year received. F/u 2 months for A1c. Postponed colon screening.

## 2025-05-10 DIAGNOSIS — J44.9 STAGE 3 SEVERE COPD BY GOLD CLASSIFICATION: ICD-10-CM

## 2025-05-13 RX ORDER — UMECLIDINIUM BROMIDE AND VILANTEROL TRIFENATATE 62.5; 25 UG/1; UG/1
POWDER RESPIRATORY (INHALATION)
Qty: 180 EACH | Refills: 1 | Status: SHIPPED | OUTPATIENT
Start: 2025-05-13

## 2025-05-29 ENCOUNTER — PATIENT OUTREACH (OUTPATIENT)
Dept: ADMINISTRATIVE | Facility: HOSPITAL | Age: 75
End: 2025-05-29
Payer: MEDICARE

## 2025-05-29 NOTE — PROGRESS NOTES
Eye exam report - chart searched, will attempt to request eye records again  MARIO ALBERTO sent to [facility] [address] to request dm eye exam. Reminder set.

## 2025-05-29 NOTE — LETTER
[id number]    AUTHORIZATION FOR RELEASE OF   CONFIDENTIAL INFORMATION    Dear [facility],    We are seeing Kami Higgins, date of birth 1950, in the clinic at [facility]. Jose Steinberg MD is the patient's PCP. Kami Higgnis has an outstanding lab/procedure at the time we reviewed her chart. In order to help keep her health information updated, she has authorized us to request the following medical record(s):                     ( X ) DM EYE EXAM               Please fax/email records to:  Fax #: [phone]  Email: [email or web address]     TAMIKO Toure @ [phone]          Patient Name: Kami Higgins  : 1950  Patient Phone #: [phone]

## 2025-06-04 ENCOUNTER — TELEPHONE (OUTPATIENT)
Dept: PULMONOLOGY | Facility: CLINIC | Age: 75
End: 2025-06-04
Payer: MEDICARE

## 2025-06-17 ENCOUNTER — PATIENT OUTREACH (OUTPATIENT)
Dept: ADMINISTRATIVE | Facility: HOSPITAL | Age: 75
End: 2025-06-17
Payer: MEDICARE

## 2025-06-17 NOTE — PROGRESS NOTES
VBC Program: Called Pt to discuss overdue HM topics, No answer, VM full.     VBHM Score: 4      Osteoporosis Screening  Eye Exam  Hemoglobin A1c  Foot Exam     RSV Vaccine